# Patient Record
Sex: MALE | Race: WHITE | NOT HISPANIC OR LATINO | Employment: OTHER | URBAN - METROPOLITAN AREA
[De-identification: names, ages, dates, MRNs, and addresses within clinical notes are randomized per-mention and may not be internally consistent; named-entity substitution may affect disease eponyms.]

---

## 2017-02-01 ENCOUNTER — HOSPITAL ENCOUNTER (EMERGENCY)
Facility: HOSPITAL | Age: 37
Discharge: HOME | End: 2017-02-01
Attending: EMERGENCY MEDICINE
Payer: MEDICARE

## 2017-02-01 VITALS
OXYGEN SATURATION: 98 % | HEART RATE: 99 BPM | RESPIRATION RATE: 18 BRPM | TEMPERATURE: 98 F | DIASTOLIC BLOOD PRESSURE: 70 MMHG | BODY MASS INDEX: 26.63 KG/M2 | WEIGHT: 170 LBS | SYSTOLIC BLOOD PRESSURE: 118 MMHG

## 2017-02-01 DIAGNOSIS — M25.569 KNEE PAIN, ACUTE: ICD-10-CM

## 2017-02-01 PROCEDURE — 99283 EMERGENCY DEPT VISIT LOW MDM: CPT

## 2017-02-01 RX ORDER — MELOXICAM 7.5 MG/1
7.5 TABLET ORAL DAILY
Qty: 15 TABLET | Refills: 0 | Status: SHIPPED | OUTPATIENT
Start: 2017-02-01 | End: 2018-05-05

## 2017-02-01 NOTE — ED AVS SNAPSHOT
[facility]  [address] [address] [address]               Philipp Gonzales   2017 11:00 AM   ED    Description:  Male : 1980   Department:  [facility]           Your Care was Coordinated By:     Provider Role From To    Paresh Moses MD Attending Provider 17 1057 --      Reason for Visit     Knee Pain           Diagnoses this Visit        Comments    Knee pain, acute           ED Disposition     ED Disposition Condition Comment    Discharge             To Do List           Follow-up Information     Follow up with Michael Perez MD In 2 days.    Specialty:  Family Medicine    Contact information:    [address] [place] [address] FAMILY MEDICINE  [facility] [address]  [phone]         These Medications        Disp Refills Start End    meloxicam (MOBIC) 7.5 MG tablet 15 tablet 0 2017     Take 1 tablet (7.5 mg total) by mouth once daily. - Oral      [facility]     [facility] -  Assistance  Registered nurses in the [facility] provide clinical advisement, health education, appointment booking, and other advisory services.  Call for this free service at [phone].             Medications           Message regarding Medications     Verify the changes and/or additions to your medication regime listed below are the same as discussed with your clinician today.  If any of these changes or additions are incorrect, please notify your healthcare provider.        START taking these NEW medications        Refills    meloxicam (MOBIC) 7.5 MG tablet 0    Sig: Take 1 tablet (7.5 mg total) by mouth once daily.    Class: Print    Route: Oral           Verify that the below list of medications is an accurate representation of the medications you are currently taking.  If none reported, the list may be blank. If incorrect, please contact your healthcare provider. Carry this list with you in case of  emergency.           Current Medications     alprazolam (XANAX) 2 MG Tab Take by mouth nightly as needed.    dextroamphetamine-amphetamine (AMPHETAMINE SALT COMBO) 30 mg Tab Take by mouth.    escitalopram (LEXAPRO) 20 MG tablet Take 20 mg by mouth once daily.    meloxicam (MOBIC) 7.5 MG tablet Take 1 tablet (7.5 mg total) by mouth once daily.    oxcarbazepine (TRILEPTAL) 150 MG Tab Take 150 mg by mouth 2 (two) times daily.           Clinical Reference Information           Your Vitals Were     BP Pulse Temp Resp Weight SpO2    118/70 (BP Location: Right arm, Patient Position: Sitting) 99 98.2 °F (36.8 °C) (Oral) 18 77.1 kg (170 lb) 98%    BMI                26.63 kg/m2          Allergies as of 2/1/2017     No Known Allergies      Immunizations Administered on Date of Encounter - 2/1/2017     None      ED Micro, Lab, POCT     None      ED Imaging Orders     Start Ordered       Status Ordering Provider    02/01/17 1112 02/01/17 1111  X-Ray Knee Complete 4 Or More Views Right  1 time imaging      Final result         Discharge Instructions         Ace Wrap  Minor muscle or joint injuries are often treated with an elastic bandage. The bandage provides support and compression to the injured area. An elastic bandage is a stretchy, rolled bandage. Elastic bandages range in width from 2 to 6 inches. They can be used for a variety of injuries. The bandages are often called ACE bandages, after the most common brand name.  If used correctly, elastic bandages help control swelling and ease pain. An elastic bandage is also a good reminder not to overuse the injured area. However, elastic bandages do not provide a lot of support and will not prevent reinjury.  Home care    To apply an elastic bandage:  · Check the skin before wrapping the injury. It should be clean, dry, and free of drainage.  · Start wrapping below the injury and work your way toward the body. For an ankle sprain, start wrapping around the foot and work up  toward the calf. This will help control swelling.  · Overlap the edges of the bandage so it stays snuggly in place.  · Wrap the bandage firmly, but not too tightly. A tight bandage can increase swelling on either end of the bandage. Make sure the bandage is wrinkle free.  · Leave fingers and toes exposed.  · Secure ends of the bandage (even self-sticking ones) with clips or tape.  · Check frequently to ensure adequate circulation, especially in the fingers and toes. Loosen the bandage if there is local swelling, numbness, tingling, discomfort, coldness, or discoloration (skin pale or bluish in color).  · Rewrap the bandage as needed during the day. Reroll the bandage as you unwind it.  Continue using the elastic bandage until the pain and swelling are gone or as your healthcare provider advises.  If you have been told to ice the area, the ice can be secured in place with the elastic bandage. Wrap the ice pack with a thin towel to protect the skin. Do not put ice or an ice pack directly on the skin.  Ice the area for no more than 20 minutes at a time.    Follow-up care  Follow up with your healthcare provider, as advised.  When to seek medical advice  Call your healthcare provider for any of the following:  · Pain and swelling that doesn't get better or gets worse  · Trouble moving injured area  · Skin discoloration, numbness, or tingling that doesnt go away after bandage is removed  © 7216-0432 [employer]. [address] [place] [address], [place] [address]. All rights reserved. This information is not intended as a substitute for professional medical care. Always follow your healthcare professional's instructions.          [facility] Sign-Up     Activating your [facility] account is as easy as 1-2-3!     1) Visit [email or web address], select Sign Up Now, enter this activation code and your date of birth, then select Next.  [email or web address]  Expires: 3/18/2017 11:40 AM      2) Create a username and password to use  when you visit [facility] in the future and select a security question in case you lose your password and select Next.    3) Enter your e-mail address and click Sign Up!    Additional Information  If you have questions, please e-mail [email or web address] or call [phone] to talk to our [facility] staff. Remember, [facility] is NOT to be used for urgent needs. For medical emergencies, dial 911.         Smoking Cessation     If you would like to quit smoking:   You may be eligible for free services if you are a [place] resident and started smoking cigarettes before September 1, 1988.  Call the Smoking Cessation Trust ([facility]) toll free at [phone] [phone] or [phone].   Call 5-937-QUIT-NOW if you do not meet the above criteria.             [facility] complies with applicable Federal civil rights laws and does not discriminate on the basis of race, color, national origin, age, disability, or sex.        Language Assistance Services     ATTENTION: Language assistance services are available, free of charge. Please call [phone].      ATENCIÓN: Si habla español, tiene a scott disposición servicios gratuitos de asistencia lingüística. Llame al [phone].     CHÚ Ý: N?u b?n nói Ti?ng Vi?t, có các d?ch v? h? tr? ngôn ng? mi?n phí dành cho b?n. G?i s? [phone].

## 2017-02-01 NOTE — DISCHARGE INSTRUCTIONS
Ace Wrap  Minor muscle or joint injuries are often treated with an elastic bandage. The bandage provides support and compression to the injured area. An elastic bandage is a stretchy, rolled bandage. Elastic bandages range in width from 2 to 6 inches. They can be used for a variety of injuries. The bandages are often called ACE bandages, after the most common brand name.  If used correctly, elastic bandages help control swelling and ease pain. An elastic bandage is also a good reminder not to overuse the injured area. However, elastic bandages do not provide a lot of support and will not prevent reinjury.  Home care    To apply an elastic bandage:  · Check the skin before wrapping the injury. It should be clean, dry, and free of drainage.  · Start wrapping below the injury and work your way toward the body. For an ankle sprain, start wrapping around the foot and work up toward the calf. This will help control swelling.  · Overlap the edges of the bandage so it stays snuggly in place.  · Wrap the bandage firmly, but not too tightly. A tight bandage can increase swelling on either end of the bandage. Make sure the bandage is wrinkle free.  · Leave fingers and toes exposed.  · Secure ends of the bandage (even self-sticking ones) with clips or tape.  · Check frequently to ensure adequate circulation, especially in the fingers and toes. Loosen the bandage if there is local swelling, numbness, tingling, discomfort, coldness, or discoloration (skin pale or bluish in color).  · Rewrap the bandage as needed during the day. Reroll the bandage as you unwind it.  Continue using the elastic bandage until the pain and swelling are gone or as your healthcare provider advises.  If you have been told to ice the area, the ice can be secured in place with the elastic bandage. Wrap the ice pack with a thin towel to protect the skin. Do not put ice or an ice pack directly on the skin.  Ice the area for no more than 20 minutes at a  time.    Follow-up care  Follow up with your healthcare provider, as advised.  When to seek medical advice  Call your healthcare provider for any of the following:  · Pain and swelling that doesn't get better or gets worse  · Trouble moving injured area  · Skin discoloration, numbness, or tingling that doesnt go away after bandage is removed  © 1783-7232 [employer]. [address] [place] [address], [place] [address]. All rights reserved. This information is not intended as a substitute for professional medical care. Always follow your healthcare professional's instructions.

## 2017-02-01 NOTE — ED PROVIDER NOTES
Encounter Date: 2/1/2017       History     Chief Complaint   Patient presents with    Knee Pain     pt was knealing on knee for a couple hours while working a couple weeks ago and pain is still present and worse at night.      Review of patient's allergies indicates:  No Known Allergies  Patient is a 36 y.o. male presenting with the following complaint: leg pain. The history is provided by the patient.   Leg Pain    The incident occurred at home. The injury mechanism was a direct blow. The incident occurred several days ago. The pain is present in the right knee. The quality of the pain is described as throbbing. The pain has been constant since onset. Pertinent negatives include no numbness, no inability to bear weight, no loss of motion, no muscle weakness, no loss of sensation and no tingling. He reports no foreign bodies present. The symptoms are aggravated by activity. He has tried nothing for the symptoms.     Past Medical History   Diagnosis Date    Anxiety     High cholesterol     Macular degeneration      No past medical history pertinent negatives.  No past surgical history on file.  Family History   Problem Relation Age of Onset    Macular degeneration Mother     Macular degeneration Maternal Grandfather      Social History   Substance Use Topics    Smoking status: Current Every Day Smoker     Packs/day: 1.00     Types: Cigarettes    Smokeless tobacco: Never Used    Alcohol use Yes      Comment: rarely     Review of Systems   Constitutional: Negative for fever.   HENT: Negative for sore throat.    Respiratory: Negative for shortness of breath.    Cardiovascular: Negative for chest pain.   Gastrointestinal: Negative for nausea.   Genitourinary: Negative for dysuria.   Musculoskeletal: Negative for back pain.   Skin: Negative for rash.   Neurological: Negative for tingling, weakness and numbness.   Hematological: Does not bruise/bleed easily.       Physical Exam   Initial Vitals   BP Pulse Resp  Temp SpO2   02/01/17 1058 02/01/17 1058 02/01/17 1058 02/01/17 1058 02/01/17 1058   118/70 99 18 98.2 °F (36.8 °C) 98 %     Physical Exam    Constitutional: He appears well-developed and well-nourished. No distress.   HENT:   Head: Normocephalic and atraumatic.   Eyes: Conjunctivae are normal. Pupils are equal, round, and reactive to light.   Neck: Normal range of motion. Neck supple.   Cardiovascular: Normal rate, regular rhythm and normal heart sounds.   Pulmonary/Chest: Breath sounds normal.   Abdominal: Soft. Bowel sounds are normal.   Musculoskeletal: Normal range of motion.        Right knee: He exhibits no swelling, no deformity and no erythema. Tenderness found. Medial joint line tenderness noted.   Neurological: He is alert and oriented to person, place, and time. No cranial nerve deficit.   Skin: Skin is warm and dry.   Psychiatric: He has a normal mood and affect.         ED Course   Orthopedic Injury  Date/Time: 2/1/2017 11:41 AM  Authorized by: SOWMYA BRONSON   Performed by: SOWMYA BRONSON  Injury location: knee  Location details: right knee  Injury type: soft tissue  Pre-procedure neurovascular assessment: neurovascularly intact  Supplies used: elastic bandage  Post-procedure neurovascular assessment: post-procedure neurovascularly intact  Patient tolerance: Patient tolerated the procedure well with no immediate complications        Labs Reviewed - No data to display     ED Vital Signs:  Vitals:    02/01/17 1058   BP: 118/70   Pulse: 99   Resp: 18   Temp: 98.2 °F (36.8 °C)   TempSrc: Oral   SpO2: 98%   Weight: 77.1 kg (170 lb)         Abnormal Lab Results:  Labs Reviewed - No data to display       All Lab Results:        Imaging Results:  Imaging Results         X-Ray Knee Complete 4 Or More Views Right (Final result) Result time:  02/01/17 11:36:20    Final result by Fide Bird MD (02/01/17 11:36:20)    Impression:     No acute findings.      Electronically signed by: FIDE  MARTINA VICTOR  Date:     02/01/17  Time:    11:36     Narrative:    XR KNEE COMP 4 OR MORE VIEWS RIGHT    History:  Right knee joint pain.    Bone density and architecture appears normal.  No obvious fracture.  Mild superior patellar spurring.                 The Emergency Provider reviewed the vital signs and test results, which are outlined above.    ED Discussions:  11:41 AM: Reassessed pt at this time.  Pt states his condition has improved at this time. Discussed with pt all pertinent ED information and results. Discussed pt dx of knee pain and plan of tx. Gave pt all f/u and return to the ED instructions. All questions and concerns were addressed at this time. Pt expresses understanding of information and instructions, and is comfortable with plan to discharge. Pt is stable for discharge.                                 ED Course     Clinical Impression:       ICD-10-CM ICD-9-CM   1. Knee pain, acute M25.569 719.46         Disposition:   Disposition: Discharged  Condition: Stable       Paresh Moses MD  02/01/17 1142

## 2017-05-16 ENCOUNTER — HOSPITAL ENCOUNTER (EMERGENCY)
Facility: HOSPITAL | Age: 37
Discharge: HOME | End: 2017-05-16
Payer: MEDICARE

## 2017-05-16 VITALS
RESPIRATION RATE: 18 BRPM | WEIGHT: 162 LBS | TEMPERATURE: 98 F | HEART RATE: 98 BPM | SYSTOLIC BLOOD PRESSURE: 132 MMHG | BODY MASS INDEX: 25.43 KG/M2 | OXYGEN SATURATION: 97 % | HEIGHT: 67 IN | DIASTOLIC BLOOD PRESSURE: 80 MMHG

## 2017-05-16 DIAGNOSIS — R52 PAIN: ICD-10-CM

## 2017-05-16 DIAGNOSIS — M25.561 RIGHT MEDIAL KNEE PAIN: Primary | ICD-10-CM

## 2017-05-16 DIAGNOSIS — T14.8XXA HEMATOMA: ICD-10-CM

## 2017-05-16 PROCEDURE — 99283 EMERGENCY DEPT VISIT LOW MDM: CPT

## 2017-05-16 NOTE — ED NOTES
"Pt here few months ago for same pain. Pt has since seen PCP and Dr Baker where he had a "cyst drained". Pt states he has to bring his [place] to the zoo this weekend and would like to have no pain while walking.   "

## 2017-05-16 NOTE — ED AVS SNAPSHOT
[facility]  [address] [address]               Philipp Gonzales   2017  6:18 PM   ED    Description:  Male : 1980   Department:  [facility]           Your Care was Coordinated By:     Provider Role From To    Juan Samuels NP Nurse Practitioner 17 [phone] --      Reason for Visit     Knee Pain           Diagnoses this Visit        Comments    Right medial knee pain    -  Primary     Hematoma         Pain           ED Disposition     None           To Do List           Follow-up Information     Follow up with Osorio Baker MD.    Specialty:  Orthopedic Surgery    Why:  Return to ER for any new or worse symptoms    Contact information:    [address] [facility]  [address]  [facility] [address] [phone]        [facility]     [facility] -  Assistance  Unless otherwise directed by your provider, please contact [facility], our nurse care line that is available for  assistance.     Registered nurses in the [facility] provide: appointment scheduling, clinical advisement, health education, and other advisory services.  Call: [phone] (toll free)               Medications           Message regarding Medications     Verify the changes and/or additions to your medication regime listed below are the same as discussed with your clinician today.  If any of these changes or additions are incorrect, please notify your healthcare provider.             Verify that the below list of medications is an accurate representation of the medications you are currently taking.  If none reported, the list may be blank. If incorrect, please contact your healthcare provider. Carry this list with you in case of emergency.           Current Medications     alprazolam (XANAX) 2 MG Tab Take by mouth nightly as needed.    dextroamphetamine-amphetamine (AMPHETAMINE SALT COMBO) 30 mg Tab Take by mouth.  "   escitalopram (LEXAPRO) 20 MG tablet Take 20 mg by mouth once daily.    oxcarbazepine (TRILEPTAL) 150 MG Tab Take 150 mg by mouth 2 (two) times daily.    meloxicam (MOBIC) 7.5 MG tablet Take 1 tablet (7.5 mg total) by mouth once daily.           Clinical Reference Information           Your Vitals Were     BP Pulse Temp Resp Height Weight    132/80 (BP Location: Left arm, Patient Position: Sitting) 98 98.2 °F (36.8 °C) (Oral) 18 5' 7" (1.702 m) 73.5 kg (162 lb)    SpO2 BMI             97% 25.37 kg/m2         Allergies as of 5/16/2017     No Known Allergies      Immunizations Administered on Date of Encounter - 5/16/2017     None      ED Micro, Lab, POCT     None      ED Imaging Orders     Start Ordered       Status Ordering Provider    05/16/17 1851 05/16/17 1850  X-Ray Knee 3 View Right  1 time imaging      Final result         Discharge Instructions         Knee Pain  Knee pain is very common. Its especially common in active people who put a lot of pressure on their knees, like runners. It affects women more often than men.  Your kneecap (patella) is a thick, round bone. It covers and protects the front portion of your knee joint. It moves along a groove in your thighbone (femur) as part of the patellofemoral joint. A layer of cartilage surrounds the underside of your kneecap. This layer protects it from grinding against your femur.  When this cartilage softens and breaks down, it can cause knee pain. This is partly because of repetitive stress. The stress irritates the lining of the joint. This causes pain in the underlying bone.  What causes knee pain?  Many things can cause knee pain. You may have more than one cause. Some of these include:  · Overuse of the knee joint  · The kneecap doesnt line up with the tissue around it  · Damage to small nerves in the area  · Damage to the ligament-like structure that holds the kneecap in place (retinaculum)  · Breakdown of the bone under the cartilage  · Swelling in " the soft tissues around the kneecap  · Injury  You might be more likely to have knee pain if you:  · Exercise a lot  · Recently increased the intensity of your workouts  · Have a body mass index (BMI) greater than 25  · Have poor alignment of your kneecap  · Walk with your feet turned overly outward or inward  · Have weakness in surrounding muscle groups (inner quad or hip adductor muscles)  · Have too much tightness in surrounding muscle groups (hamstrings or iliotibial band)  · Have a recent history of injury to the area  · Are female  Symptoms of knee pain  This type of knee pain is a dull, aching pain in the front of the knee in the area under and around the kneecap. This pain may start quickly or slowly. Your pain might be worse when you squat, run, or sit for a long time. You might also sometimes feel like your knee is giving out. You may have symptoms in one or both of your knees.  Diagnosing knee pain  Your health care provider will ask about your medical history and your symptoms. Be sure to describe any activities that make your knee pain worse. He or she will look at your knee. This will include tests of your range of motion, strength, and areas of pain of your knee. Your knee alignment will be checked.  Your health care provider will need to rule out other causes of your knee pain, such as arthritis. You may need an imaging test, such as an X-ray or MRI.  Treatment for knee pain  Treatments that can help ease your symptoms may include:  · Avoiding activities for a while that make your pain worse, returning to activity over time  · Icing the outside of your knee when it causes you pain  · Taking over-the-counter pain medicine  · Wearing a knee brace or taping your knee to support it  · Wearing special shoe inserts to help keep your feet in the proper alignment  · Doing special exercises to stretch and strengthen the muscles around your hip and your knee  These steps help most people manage knee pain. But  some cases of knee pain need to be treated with surgery. You may need surgery right away. Or you may need it later if other treatments dont work. Your health care provider may refer you to an orthopedic surgeon. He or she will talk with you about your choices.  Preventing knee pain  Losing weight and correcting excess muscle tightness or muscle weakness may help lower your risk.  In some cases, you can prevent knee pain. To help prevent a flare-up of knee pain, you do these things:  · Regularly do all the exercises your doctor or physical therapist advises  · Support your knee as advised by your doctor or physical therapist  · Increase training gradually, and ease up on training when needed  · Have an expert check your gait for running or other sporting activities  · Stretch properly before and after exercise  · Replace your running shoes regularly  · Lose excess weight     When to call your health care provider  Call your health care provider right away if:  · Your symptoms dont get better after a few weeks of treatment  · You have any new symptoms   Date Last Reviewed: 3/19/2015  © 7047-5068 [employer]. [address] [place] [address]. All rights reserved. This information is not intended as a substitute for professional medical care. Always follow your healthcare professional's instructions.          RICE     Rest an injury, elevate it, and use ice and compression as directed.   RICE stands for rest, ice, compression, and elevation. These can limit pain and swelling after an injury. RICE may be recommended to help treat fractures, sprains, strains, and bruises or bumps.   Home care  The following explain the details of RICE:  · Rest. Limit the use of the injured body part. This helps prevent further damage to the body part and gives it time to heal. In some cases, you may need a sling, brace, splint, or cast to help keep the body part still until it has healed.  · Ice. Applying ice  right after an injury helps relieve pain and swelling. Wrap a bag of ice in a thin towel. Then, place it over the injured area. Do this for 10 to 15 minutes every 3 to 4 hours. Continue for the next 1 to 3 days or until your symptoms improve. Never put ice directly on your skin or ice an area longer than 15 minutes at a time.  · Compression. Putting pressure on an injury helps reduce swelling and provides support. Wrap the injured area firmly with an elastic bandage/wrap. Make sure not to wrap the bandage too tightly or you will cut off blood flow to the injured area. If your bandage loosens, rewrap it.  · Elevation. Keeping an injury raised above the level of your heart reduces swelling, pain, and throbbing. For instance, if you have a broken leg, it may help to rest your leg on several pillows when sitting or lying down. Try to keep the injured area elevated for at least 2 to 3 hours per day.  Follow-up care  Follow up with your healthcare provider, or as advised.  When to seek medical advice  Call your healthcare provider right away if any of these occur:  · Fever of 100.4°F (38°C) or higher, or as directed by your healthcare provider  · Increased pain or swelling in the injured body part  · Injured body part becomes cold, blue, numb, or tingly  · Signs of infection. These include warmth in the skin, redness, drainage, or bad smell coming from the injured body part.  Date Last Reviewed: 1/18/2016  © 1760-2335 [employer]. [address] [place] [address], [place] [address]. All rights reserved. This information is not intended as a substitute for professional medical care. Always follow your healthcare professional's instructions.          [facility] Sign-Up     Activating your [facility] account is as easy as 1-2-3!     1) Visit [email or web address], select Sign Up Now, enter this activation code and your date of birth, then select Next.  [email or web address]  Expires: 6/30/2017  7:40 PM      2) Create a username and  password to use when you visit [facility] in the future and select a security question in case you lose your password and select Next.    3) Enter your e-mail address and click Sign Up!    Additional Information  If you have questions, please e-mail [email or web address] or call [phone] to talk to our [facility] staff. Remember, [facility] is NOT to be used for urgent needs. For medical emergencies, dial 911.         Smoking Cessation     If you would like to quit smoking:   You may be eligible for free services if you are a [place] resident and started smoking cigarettes before September 1, 1988.  Call the Smoking Cessation Trust ([facility]) toll free at [phone] or [phone] [phone].   Call [phone]-QUIT-NOW if you do not meet the above criteria.   Contact us via email: [email or web address]   View our website for more information: [email or web address]         [facility] complies with applicable Federal civil rights laws and does not discriminate on the basis of race, color, national origin, age, disability, or sex.        Language Assistance Services     ATTENTION: Language assistance services are available, free of charge. Please call [phone].      ATENCIÓN: Si habla yissel, tiene a scott disposición servicios gratuitos de asistencia lingüística. Llame al [phone].     CHÚ Ý: N?u b?n nói Ti?ng Vi?t, có các d?ch v? h? tr? ngôn ng? mi?n phí dành cho b?n. G?i s? [phone].

## 2017-05-17 NOTE — DISCHARGE INSTRUCTIONS
Knee Pain  Knee pain is very common. Its especially common in active people who put a lot of pressure on their knees, like runners. It affects women more often than men.  Your kneecap (patella) is a thick, round bone. It covers and protects the front portion of your knee joint. It moves along a groove in your thighbone (femur) as part of the patellofemoral joint. A layer of cartilage surrounds the underside of your kneecap. This layer protects it from grinding against your femur.  When this cartilage softens and breaks down, it can cause knee pain. This is partly because of repetitive stress. The stress irritates the lining of the joint. This causes pain in the underlying bone.  What causes knee pain?  Many things can cause knee pain. You may have more than one cause. Some of these include:  · Overuse of the knee joint  · The kneecap doesnt line up with the tissue around it  · Damage to small nerves in the area  · Damage to the ligament-like structure that holds the kneecap in place (retinaculum)  · Breakdown of the bone under the cartilage  · Swelling in the soft tissues around the kneecap  · Injury  You might be more likely to have knee pain if you:  · Exercise a lot  · Recently increased the intensity of your workouts  · Have a body mass index (BMI) greater than 25  · Have poor alignment of your kneecap  · Walk with your feet turned overly outward or inward  · Have weakness in surrounding muscle groups (inner quad or hip adductor muscles)  · Have too much tightness in surrounding muscle groups (hamstrings or iliotibial band)  · Have a recent history of injury to the area  · Are female  Symptoms of knee pain  This type of knee pain is a dull, aching pain in the front of the knee in the area under and around the kneecap. This pain may start quickly or slowly. Your pain might be worse when you squat, run, or sit for a long time. You might also sometimes feel like your knee is giving out. You may have symptoms in  one or both of your knees.  Diagnosing knee pain  Your health care provider will ask about your medical history and your symptoms. Be sure to describe any activities that make your knee pain worse. He or she will look at your knee. This will include tests of your range of motion, strength, and areas of pain of your knee. Your knee alignment will be checked.  Your health care provider will need to rule out other causes of your knee pain, such as arthritis. You may need an imaging test, such as an X-ray or MRI.  Treatment for knee pain  Treatments that can help ease your symptoms may include:  · Avoiding activities for a while that make your pain worse, returning to activity over time  · Icing the outside of your knee when it causes you pain  · Taking over-the-counter pain medicine  · Wearing a knee brace or taping your knee to support it  · Wearing special shoe inserts to help keep your feet in the proper alignment  · Doing special exercises to stretch and strengthen the muscles around your hip and your knee  These steps help most people manage knee pain. But some cases of knee pain need to be treated with surgery. You may need surgery right away. Or you may need it later if other treatments dont work. Your health care provider may refer you to an orthopedic surgeon. He or she will talk with you about your choices.  Preventing knee pain  Losing weight and correcting excess muscle tightness or muscle weakness may help lower your risk.  In some cases, you can prevent knee pain. To help prevent a flare-up of knee pain, you do these things:  · Regularly do all the exercises your doctor or physical therapist advises  · Support your knee as advised by your doctor or physical therapist  · Increase training gradually, and ease up on training when needed  · Have an expert check your gait for running or other sporting activities  · Stretch properly before and after exercise  · Replace your running shoes regularly  · Lose  excess weight     When to call your health care provider  Call your health care provider right away if:  · Your symptoms dont get better after a few weeks of treatment  · You have any new symptoms   Date Last Reviewed: 3/19/2015  © 7891-7788 [employer]. [address] [place] [address]. All rights reserved. This information is not intended as a substitute for professional medical care. Always follow your healthcare professional's instructions.          RICE     Rest an injury, elevate it, and use ice and compression as directed.   RICE stands for rest, ice, compression, and elevation. These can limit pain and swelling after an injury. RICE may be recommended to help treat fractures, sprains, strains, and bruises or bumps.   Home care  The following explain the details of RICE:  · Rest. Limit the use of the injured body part. This helps prevent further damage to the body part and gives it time to heal. In some cases, you may need a sling, brace, splint, or cast to help keep the body part still until it has healed.  · Ice. Applying ice right after an injury helps relieve pain and swelling. Wrap a bag of ice in a thin towel. Then, place it over the injured area. Do this for 10 to 15 minutes every 3 to 4 hours. Continue for the next 1 to 3 days or until your symptoms improve. Never put ice directly on your skin or ice an area longer than 15 minutes at a time.  · Compression. Putting pressure on an injury helps reduce swelling and provides support. Wrap the injured area firmly with an elastic bandage/wrap. Make sure not to wrap the bandage too tightly or you will cut off blood flow to the injured area. If your bandage loosens, rewrap it.  · Elevation. Keeping an injury raised above the level of your heart reduces swelling, pain, and throbbing. For instance, if you have a broken leg, it may help to rest your leg on several pillows when sitting or lying down. Try to keep the injured area elevated  for at least 2 to 3 hours per day.  Follow-up care  Follow up with your healthcare provider, or as advised.  When to seek medical advice  Call your healthcare provider right away if any of these occur:  · Fever of 100.4°F (38°C) or higher, or as directed by your healthcare provider  · Increased pain or swelling in the injured body part  · Injured body part becomes cold, blue, numb, or tingly  · Signs of infection. These include warmth in the skin, redness, drainage, or bad smell coming from the injured body part.  Date Last Reviewed: 1/18/2016  © 9299-3610 [employer]. [address] [place] [address]. All rights reserved. This information is not intended as a substitute for professional medical care. Always follow your healthcare professional's instructions.

## 2017-05-17 NOTE — ED PROVIDER NOTES
Encounter Date: 5/16/2017       History     Chief Complaint   Patient presents with    Knee Pain     right knee pain for a few months      Review of patient's allergies indicates:  No Known Allergies  HPI Comments: Patient had fluid aspiration of right knee per Dr. Baker in office 05/08/2017. Patient reports now he has mild swelling to right medial knee with pain 1/10 with ambulation. Patient reports he called Dr. Baker's office and they reported pain and swelling would be normal after this type of procedure. Patient just wants to be checked out to make sure everything is okay. Patient denies any numbness, tingling, drainage or weakness,     Patient is a 36 y.o. male presenting with the following complaint: leg pain. The history is provided by the patient.   Leg Pain    Incident location: at orthopedics office. Injury mechanism: needle aspiration. The incident occurred several days ago. The pain is present in the right knee. The quality of the pain is described as throbbing. The pain is at a severity of 1/10. The pain has been constant since onset. Pertinent negatives include no numbness, no inability to bear weight, no loss of motion, no muscle weakness, no loss of sensation and no tingling. He reports no foreign bodies present. The symptoms are aggravated by palpation. He has tried nothing for the symptoms. The treatment provided no relief.     Past Medical History:   Diagnosis Date    Anxiety     High cholesterol     Macular degeneration      History reviewed. No pertinent surgical history.  Family History   Problem Relation Age of Onset    Macular degeneration Mother     Macular degeneration Maternal Grandfather      Social History   Substance Use Topics    Smoking status: Current Every Day Smoker     Packs/day: 1.00     Types: Cigarettes    Smokeless tobacco: Never Used    Alcohol use Yes      Comment: rarely     Review of Systems   Constitutional: Negative for fever.   HENT: Negative for sore  throat.    Respiratory: Negative for shortness of breath.    Cardiovascular: Negative for chest pain.   Gastrointestinal: Negative for nausea.   Genitourinary: Negative for dysuria.   Musculoskeletal: Negative for back pain.   Skin: Negative for rash.        Hematoma right medial knee   Neurological: Negative for tingling, weakness and numbness.   Hematological: Does not bruise/bleed easily.   All other systems reviewed and are negative.      Physical Exam   Initial Vitals   BP Pulse Resp Temp SpO2   05/16/17 1816 05/16/17 1816 05/16/17 1816 05/16/17 1816 05/16/17 1816   132/80 98 18 98.2 °F (36.8 °C) 97 %     Physical Exam    Nursing note and vitals reviewed.  Constitutional: He appears well-developed and well-nourished.   HENT:   Head: Normocephalic and atraumatic.   Eyes: EOM are normal. Pupils are equal, round, and reactive to light.   Neck: Normal range of motion. Neck supple.   Cardiovascular: Normal rate, regular rhythm, intact distal pulses and normal pulses.   Pulses:       Dorsalis pedis pulses are 2+ on the right side, and 2+ on the left side.   Pulmonary/Chest: Breath sounds normal.   Abdominal: Soft. Bowel sounds are normal.   Musculoskeletal: Normal range of motion.        Right knee: He exhibits ecchymosis. Tenderness found.        Legs:  Patient has full ROM of right knee and is neurovascular intact distal to injury    Neurological: He is alert and oriented to person, place, and time. He has normal strength. No sensory deficit.   Skin: Skin is warm and dry.   Psychiatric: He has a normal mood and affect.         ED Course   Procedures  Labs Reviewed - No data to display     7:43 PM RE-EVALUATION & DISPOSITION:   Reassessment at the time of disposition demonstrates that the patient is resting comfortably in no acute distress.  He has remained hemodynamically stable throughout the entire ED visit and is without objective evidence for acute process requiring urgent intervention or hospitalization. I  discussed test results and provided counseling to patient with regard to condition, the treatment plan, specific conditions for return, and the importance of follow up.  Answered questions at this time. The patient is stable for discharge. Patient is ambulating without pain or difficulty. Patient will follow-up with Dr. Baker next week.                          ED Course     Clinical Impression:   The primary encounter diagnosis was Right medial knee pain. Diagnoses of Hematoma and Pain were also pertinent to this visit.    Disposition:   Disposition: Discharged  Condition: Stable       Juan Samuels NP  05/16/17 1945

## 2018-05-05 ENCOUNTER — HOSPITAL ENCOUNTER (EMERGENCY)
Facility: HOSPITAL | Age: 38
Discharge: HOME | End: 2018-05-05
Payer: MEDICARE

## 2018-05-05 VITALS
DIASTOLIC BLOOD PRESSURE: 88 MMHG | HEART RATE: 84 BPM | RESPIRATION RATE: 18 BRPM | OXYGEN SATURATION: 96 % | SYSTOLIC BLOOD PRESSURE: 144 MMHG | WEIGHT: 228.38 LBS | TEMPERATURE: 99 F | HEIGHT: 67 IN | BODY MASS INDEX: 35.84 KG/M2

## 2018-05-05 DIAGNOSIS — M25.461 KNEE EFFUSION, RIGHT: Primary | ICD-10-CM

## 2018-05-05 DIAGNOSIS — M25.561 KNEE PAIN, RIGHT: ICD-10-CM

## 2018-05-05 PROCEDURE — 99283 EMERGENCY DEPT VISIT LOW MDM: CPT | Mod: 25

## 2018-05-05 PROCEDURE — 29505 APPLICATION LONG LEG SPLINT: CPT | Mod: RT

## 2018-05-05 PROCEDURE — 25000003 PHARM REV CODE 250: Performed by: NURSE PRACTITIONER

## 2018-05-05 RX ORDER — NAPROXEN 500 MG/1
500 TABLET ORAL
Status: COMPLETED | OUTPATIENT
Start: 2018-05-05 | End: 2018-05-05

## 2018-05-05 RX ORDER — NAPROXEN 500 MG/1
500 TABLET ORAL 2 TIMES DAILY WITH MEALS
Qty: 14 TABLET | Refills: 0 | OUTPATIENT
Start: 2018-05-05 | End: 2019-05-30

## 2018-05-05 RX ADMIN — NAPROXEN 500 MG: 500 TABLET ORAL at 12:05

## 2018-05-05 NOTE — ED PROVIDER NOTES
Encounter Date: 5/5/2018       History     Chief Complaint   Patient presents with    Knee Pain     x 2 years, right knee, denies injury, got worse in last 2 weeks, pain radiating down shin     The history is provided by the patient.   Leg Pain    There was no injury mechanism. Illness onset: 2 years right knee pain worse in last few days with mild sweling to inner knee, frantz is legally blind and is ED today because he was able t o find ride  The pain is present in the right knee. The pain is at a severity of 6/10. The pain has been intermittent since onset. Pertinent negatives include no numbness, no inability to bear weight, no loss of motion, no muscle weakness, no loss of sensation and no tingling. He reports no foreign bodies present. The symptoms are aggravated by bearing weight. He has tried nothing (was trated buy ortho with needle aspiration of cyst 6 mnonths ago but not feeling any better, pain now going down leg when walking ) for the symptoms. The treatment provided no relief.     Review of patient's allergies indicates:  No Known Allergies  Past Medical History:   Diagnosis Date    Anxiety     High cholesterol     Macular degeneration      No past surgical history on file.  Family History   Problem Relation Age of Onset    Macular degeneration Mother     Macular degeneration Maternal Grandfather      Social History   Substance Use Topics    Smoking status: Current Every Day Smoker     Packs/day: 1.00     Types: Cigarettes    Smokeless tobacco: Never Used    Alcohol use Yes      Comment: rarely     Review of Systems   Constitutional: Negative for fever.   HENT: Negative for sore throat.    Respiratory: Negative for shortness of breath.    Cardiovascular: Negative for chest pain.   Gastrointestinal: Negative for nausea.   Genitourinary: Negative for dysuria.   Musculoskeletal: Positive for joint swelling. Negative for back pain and gait problem.        Right inner knee mild swelling    Skin:  Negative for rash.   Neurological: Negative for tingling, weakness and numbness.   Hematological: Does not bruise/bleed easily.   All other systems reviewed and are negative.      Physical Exam     Initial Vitals [05/05/18 1236]   BP Pulse Resp Temp SpO2   (!) 156/89 95 18 98.5 °F (36.9 °C) 96 %      MAP       111.33         Physical Exam    Nursing note and vitals reviewed.  Constitutional: Vital signs are normal. He appears well-nourished. He is not diaphoretic. He is cooperative.  Non-toxic appearance. He does not have a sickly appearance. He does not appear ill. No distress.   HENT:   Head: Normocephalic. Head is without laceration.   Right Ear: External ear normal.   Left Ear: External ear normal.   Eyes: Conjunctivae and lids are normal. Lids are everted and swept, no foreign bodies found.   Neck: Trachea normal, normal range of motion, full passive range of motion without pain and phonation normal. Neck supple. No thyromegaly present.   Cardiovascular: Regular rhythm, normal heart sounds, intact distal pulses and normal pulses.   Abdominal: Soft. Normal appearance and bowel sounds are normal. He exhibits no distension, no ascites and no mass. There is no tenderness.   Musculoskeletal:        Right hip: Normal.        Right knee: He exhibits swelling and effusion. He exhibits normal range of motion, no ecchymosis, no deformity, no laceration, no erythema, normal alignment, no LCL laxity, normal patellar mobility, no bony tenderness, normal meniscus and no MCL laxity. Tenderness found. MCL tenderness noted. No medial joint line, no lateral joint line, no LCL and no patellar tendon tenderness noted.        Right ankle: Normal.        Right upper leg: Normal.        Right lower leg: Normal.        Legs:       Right foot: Normal.   Lymphadenopathy:     He has no cervical adenopathy.     He has no axillary adenopathy.   Neurological: He is alert and oriented to person, place, and time. He has normal reflexes. No  cranial nerve deficit or sensory deficit. GCS eye subscore is 4. GCS verbal subscore is 5. GCS motor subscore is 6.   Skin: Skin is warm, dry and intact. Capillary refill takes less than 2 seconds. No laceration, no rash and no abscess noted. No erythema.   Psychiatric: He has a normal mood and affect. His speech is normal and behavior is normal. Cognition and memory are normal.         ED Course   Procedures  Labs Reviewed - No data to display     Imaging Results          X-Ray Knee Complete 4 or more Views Right (Final result)  Result time 05/05/18 13:04:03   Procedure changed from X-Ray Knee 3 View Right     Final result by Aki Leal MD (05/05/18 13:04:03)                 Impression:      No acute fracture or dislocation.  Small joint effusion is suspected.  Mild degenerative changes.      Electronically signed by: Aki Leal MD  Date:    05/05/2018  Time:    13:04             Narrative:    EXAMINATION:  XR KNEE COMP 4 OR MORE VIEWS RIGHT    CLINICAL HISTORY:  Pain in right kneeknee pain, right;    FINDINGS:  Results:  No evidence of acute fracture or dislocation.  Bony mineralization is normal.  Soft tissues are unremarkable. Lateral view of the right knee demonstrates small joint effusion.    Mild tricompartment degenerative changes.                            Water on the knee is also known as knee effusion. The knee joint normally has less than 1 ounce of fluid. Injury or inflammation of the knee joint causes extra fluid to collect there. When this happens, the knee joint looks swollen and is usually painful. It may be hard to fully bend the knee.  The most common cause of water on the knee is osteoarthritis due to wear and tear on the joint cartilage. Other causes include injury to the cartilage, inflammatory arthritis such as gout or rheumatoid arthritis, and infection of the joint.  You may need a needle aspiration, if the cause of your water on the knee is not certain. This procedure removes a  sample of joint fluid from the knee for testing. This is done with a local anesthetic. Removing excess fluid may also relieve swelling and pain.  Home care  · Limit your activities. Stay off the injured leg as much as possible until pain improves.  · Keep your leg elevated to reduce pain and swelling. When sleeping, place a pillow under the injured leg. When sitting, support the injured leg so it is level with your waist. This is very important during the first 48 hours.  · Apply an ice pack over the injured area for 15 to 20 minutes every 3 to 6 hours. You should do this for the first 24 to 48 hours. You can make an ice pack by filling a plastic bag that seals at the top with ice cubes and then wrapping it with a thin towel. Continue to use ice packs for relief of pain and swelling as needed. As the ice melts, be careful to avoid getting your wrap, splint, or cast wet. After 48 hours, apply heat(warm shower or warm bath) for 15 to 20 minutes several times a day, or alternate ice and heat. If you have to wear a hook-and-loop knee brace, you can open it to apply the ice pack, or heat, directly to the knee. Never put ice directly on the skin. Always wrap the ice in a towel or other type of cloth.  · You may use over-the-counter pain medicine to control pain, unless another pain medicine was prescribed. If you have chronic liver or kidney disease or have ever had a stomach ulcer or GI bleeding, talk with your healthcare provider before using these medicines.  · If crutches or a walker have been recommended, do not put weight on the injured leg until you can do so without pain. Check with your healthcare provider before returning to sports or full work duties.  · If you have a hook-and-loop knee brace, you can remove it to bathe and sleep, unless told otherwise.  Follow-up care  Follow up with your healthcare provider as advised.  If you are overweight, talk to your healthcare provider about a weight loss program. The  excess weight puts extra strain on your knees.  When to seek medical advice  Call your healthcare provider right away if any of these occur:  · Increasing pain, redness, or swelling of the knee  · Fever of 100.4°F (38°C) or above lasting for 24 to 48 hours    All historical, clinical, radiographic, and laboratory findings were reviewed with the patient in detail.  Findings are consistent with a diagnosis of knee effusion  All remaining questions and concerns were addressed at that time.  Patient has been counseled regarding the need for follow-up as well as the indication to return to the emergency room should new or worrisome developments occur.                                Clinical Impression:   The primary encounter diagnosis was Knee effusion, right. A diagnosis of Knee pain, right was also pertinent to this visit.                             Mitchell Schroeder NP  05/05/18 8210

## 2019-05-30 ENCOUNTER — HOSPITAL ENCOUNTER (EMERGENCY)
Facility: HOSPITAL | Age: 39
Discharge: HOME | End: 2019-05-30
Attending: EMERGENCY MEDICINE
Payer: MEDICARE

## 2019-05-30 VITALS
BODY MASS INDEX: 36.06 KG/M2 | OXYGEN SATURATION: 96 % | WEIGHT: 229.75 LBS | DIASTOLIC BLOOD PRESSURE: 78 MMHG | HEIGHT: 67 IN | HEART RATE: 90 BPM | SYSTOLIC BLOOD PRESSURE: 129 MMHG | RESPIRATION RATE: 20 BRPM | TEMPERATURE: 99 F

## 2019-05-30 DIAGNOSIS — F17.200 CURRENT SMOKER: ICD-10-CM

## 2019-05-30 DIAGNOSIS — R03.0 ELEVATED BLOOD PRESSURE READING: ICD-10-CM

## 2019-05-30 DIAGNOSIS — K08.89 DENTALGIA: ICD-10-CM

## 2019-05-30 DIAGNOSIS — K02.9 DENTAL CARIES: Primary | ICD-10-CM

## 2019-05-30 PROCEDURE — 99284 EMERGENCY DEPT VISIT MOD MDM: CPT | Mod: ER

## 2019-05-30 RX ORDER — DICLOFENAC SODIUM 75 MG/1
75 TABLET, DELAYED RELEASE ORAL 2 TIMES DAILY
Qty: 10 TABLET | Refills: 0 | Status: SHIPPED | OUTPATIENT
Start: 2019-05-30 | End: 2019-06-04

## 2019-05-30 RX ORDER — PENICILLIN V POTASSIUM 500 MG/1
500 TABLET, FILM COATED ORAL 4 TIMES DAILY
Qty: 28 TABLET | Refills: 0 | Status: SHIPPED | OUTPATIENT
Start: 2019-05-30 | End: 2019-06-06

## 2019-05-30 NOTE — ED NOTES
Aaox3, skin warm and dry, resp unlabored and even. amb with steady gait and cunha well. C/o left lower jaw pain secondary to dental problems and madonna ear pain interm.for month.

## 2019-05-30 NOTE — ED PROVIDER NOTES
History      Chief Complaint   Patient presents with    Dental Pain     left lower jaw pain for 2 days and alternating ear pain for month       Review of patient's allergies indicates:  No Known Allergies     HPI   HPI    5/30/2019, 2:13 PM   History obtained from the patient      History of Present Illness: Philipp Gonzales is a 38 y.o. male patient who presents to the Emergency Department for left lower dental pain x 2-3 days.  Patient states that pain was relieved with OTC pain medications but now gums are swollen.  He states that in the past when this occurs; an antibiotic has helped.  Denies fever, vomiting, diarrhea, difficulty breathing, difficulty swallowing, chest pain, SOB, headache, dizziness.        Arrival mode: Personal vehicle      PCP: Michael Perez MD       Past Medical History:  Past Medical History:   Diagnosis Date    Anxiety     High cholesterol     Insomnia     Legally blind     Macular degeneration        Past Surgical History:  History reviewed. No pertinent surgical history.      Family History:  Family History   Problem Relation Age of Onset    Macular degeneration Mother     Macular degeneration Maternal Grandfather        Social History:  Social History     Tobacco Use    Smoking status: Current Every Day Smoker     Packs/day: 1.00     Types: Cigarettes    Smokeless tobacco: Never Used   Substance and Sexual Activity    Alcohol use: Yes     Comment: rarely    Drug use: No    Sexual activity: Yes     Partners: Female       ROS   Review of Systems   Constitutional: Negative for chills and fever.   HENT: Positive for dental problem. Negative for ear pain and sore throat.    Eyes: Negative for discharge and redness.   Respiratory: Negative for cough and wheezing.    Cardiovascular: Negative for chest pain, palpitations and leg swelling.   Gastrointestinal: Negative for diarrhea, nausea and vomiting.   Genitourinary: Negative for dysuria and frequency.   Musculoskeletal:  "Negative for back pain and neck pain.   Skin: Negative for rash and wound.   Neurological: Negative for dizziness and headaches.       Physical Exam      Initial Vitals [05/30/19 1348]   BP Pulse Resp Temp SpO2   129/78 90 20 99.1 °F (37.3 °C) 96 %      MAP       --          Physical Exam  Nursing Notes and Vital Signs Reviewed.  Constitutional: Patient is in no apparent distress. Awake and alert. Well-developed and well-nourished.  Head: Atraumatic. Normocephalic.  Eyes: PERRL. EOM intact. Conjunctivae are not pale. No scleral icterus.  ENT: Mucous membranes are moist. Oropharynx is clear and symmetric. Multiple missing teeth.  Multiple dental caries.  No facial swelling noted.  No erythema or swelling of gingiva noted.  Handling secretions without difficulty.     Neck: Supple. Full ROM. No lymphadenopathy.  Cardiovascular: Regular rate. Regular rhythm. No murmurs, rubs, or gallops. Distal pulses are 2+ and symmetric.  Pulmonary/Chest: No respiratory distress. Clear to auscultation bilaterally. No wheezing, rales, or rhonchi.  Abdominal: Soft and non-distended.  There is no tenderness.  No rebound, guarding, or rigidity.   Musculoskeletal: Moves all extremities. No obvious deformities. No edema. No calf tenderness.  Skin: Warm and dry.  Neurological:  Alert, awake, and appropriate.  Normal speech.  No acute focal neurological deficits are appreciated.  Psychiatric: Normal affect. Good eye contact. Appropriate in content.    ED Course    Procedures  ED Vital Signs:  Vitals:    05/30/19 1348   BP: 129/78   Pulse: 90   Resp: 20   Temp: 99.1 °F (37.3 °C)   TempSrc: Oral   SpO2: 96%   Weight: 104.2 kg (229 lb 11.5 oz)   Height: 5' 7" (1.702 m)       Abnormal Lab Results:  Labs Reviewed - No data to display     All Lab Results:  None    Imaging Results:  Imaging Results    None                 The Emergency Provider reviewed the vital signs and test results, which are outlined above.    ED Discussion     2:20 PM:  " Discussed with pt all pertinent ED information and results. Discussed pt dx and plan of tx. Gave pt all f/u and return to the ED instructions. All questions and concerns were addressed at this time. Pt expresses understanding of information and instructions, and is comfortable with plan to discharge. Pt is stable for discharge.    I discussed with patient and/or family/caretaker that evaluation in the ED does not suggest any emergent or life threatening medical conditions requiring immediate intervention beyond what was provided in the ED, and I believe patient is safe for discharge.  Regardless, an unremarkable evaluation in the ED does not preclude the development or presence of a serious of life threatening condition. As such, patient was instructed to return immediately for any worsening or change in current symptoms.    Pre-hypertension/Hypertension: The pt has been informed that they may have pre-hypertension or hypertension based on a blood pressure reading in the ED. I recommend that the pt call the PCP listed on their discharge instructions or a physician of their choice this week to arrange f/u for further evaluation of possible pre-hypertension or hypertension.       ED Medication(s):  Medications - No data to display    Discharge Medication List as of 5/30/2019  2:22 PM      START taking these medications    Details   diclofenac (VOLTAREN) 75 MG EC tablet Take 1 tablet (75 mg total) by mouth 2 (two) times daily. for 5 days, Starting u 5/30/2019, Until Tue 6/4/2019, Print      penicillin v potassium (VEETID) 500 MG tablet Take 1 tablet (500 mg total) by mouth 4 (four) times daily. for 7 days, Starting u 5/30/2019, Until Thu 6/6/2019, Print             Follow-up Information     [facility]. Schedule an appointment as soon as possible for a visit in 3 days.    Why:  Dental appointment  Contact information:  Address: [address] [place] [id number].  Phone:  [phone]                    Medical Decision Making        Additional MDM:   Smoking Cessation: The patient is a smoker. The patient was counseled on smoking cessation for: 3 minutes. The patient was counseled on tobacco related  health complications.            Clinical Impression       ICD-10-CM ICD-9-CM   1. Dental caries K02.9 521.00   2. Dentalgia K08.89 525.9   3. Current smoker F17.200 305.1   4. Elevated blood pressure reading R03.0 796.2       Disposition:   Disposition: Discharged  Condition: Stable           Lili Chester PA-C  05/30/19 1521

## 2019-07-23 ENCOUNTER — OFFICE VISIT (OUTPATIENT)
Dept: INTERNAL MEDICINE | Facility: CLINIC | Age: 39
End: 2019-07-23
Payer: MEDICARE

## 2019-07-23 VITALS
TEMPERATURE: 97 F | WEIGHT: 232.38 LBS | HEART RATE: 77 BPM | OXYGEN SATURATION: 95 % | BODY MASS INDEX: 36.47 KG/M2 | HEIGHT: 67 IN | DIASTOLIC BLOOD PRESSURE: 78 MMHG | RESPIRATION RATE: 16 BRPM | SYSTOLIC BLOOD PRESSURE: 137 MMHG

## 2019-07-23 DIAGNOSIS — K02.9 DENTAL CARIES: ICD-10-CM

## 2019-07-23 PROCEDURE — 99999 PR PBB SHADOW E&M-EST. PATIENT-LVL III: CPT | Mod: PBBFAC,,, | Performed by: FAMILY MEDICINE

## 2019-07-23 PROCEDURE — 99999 PR PBB SHADOW E&M-EST. PATIENT-LVL III: ICD-10-PCS | Mod: PBBFAC,,, | Performed by: FAMILY MEDICINE

## 2019-07-23 PROCEDURE — 99213 OFFICE O/P EST LOW 20 MIN: CPT | Mod: PBBFAC,PO | Performed by: FAMILY MEDICINE

## 2019-07-23 PROCEDURE — 99203 OFFICE O/P NEW LOW 30 MIN: CPT | Mod: S$PBB,,, | Performed by: FAMILY MEDICINE

## 2019-07-23 PROCEDURE — 99203 PR OFFICE/OUTPT VISIT, NEW, LEVL III, 30-44 MIN: ICD-10-PCS | Mod: S$PBB,,, | Performed by: FAMILY MEDICINE

## 2019-07-23 RX ORDER — AMOXICILLIN AND CLAVULANATE POTASSIUM 875; 125 MG/1; MG/1
1 TABLET, FILM COATED ORAL EVERY 12 HOURS
Qty: 14 TABLET | Refills: 0 | Status: SHIPPED | OUTPATIENT
Start: 2019-07-23 | End: 2019-07-30

## 2019-07-23 RX ORDER — IBUPROFEN 800 MG/1
800 TABLET ORAL 3 TIMES DAILY PRN
Qty: 30 TABLET | Refills: 0 | Status: SHIPPED | OUTPATIENT
Start: 2019-07-23 | End: 2019-09-12 | Stop reason: SDUPTHER

## 2019-07-23 RX ORDER — TRAZODONE HYDROCHLORIDE 100 MG/1
100 TABLET ORAL NIGHTLY
Refills: 2 | COMMUNITY
Start: 2019-06-21 | End: 2022-03-14 | Stop reason: SDUPTHER

## 2019-07-23 RX ORDER — ESCITALOPRAM OXALATE 20 MG/1
TABLET ORAL
COMMUNITY
Start: 2019-07-12 | End: 2021-02-13 | Stop reason: SDUPTHER

## 2019-07-23 NOTE — PROGRESS NOTES
Subjective:       Patient ID: Philipp Gonzales is a 39 y.o. male.    Chief Complaint: Dental Pain (tooth)    HPI  Here today as a new patient for a new complaint to me come pain and tooth decay.  He has a chronic history of dental cavities.  Does not currently have a dentist.  Most recently was seen in the emergency room in May of this year for the same problem and was given medication to help with his pain as well as antibiotic therapy.  Not having any fever nor chills.  No swollen lymph nodes.  No difficulty swallowing.  Nothing makes it better and nothing makes it worse.    Family History   Problem Relation Age of Onset    Macular degeneration Mother     Macular degeneration Maternal Grandfather        Current Outpatient Medications:     escitalopram oxalate (LEXAPRO) 20 MG tablet, TAKE ONE TABLET BY MOUTH EVERY DAY, Disp: , Rfl:     traZODone (DESYREL) 100 MG tablet, Take 100 mg by mouth nightly., Disp: , Rfl: 2    amoxicillin-clavulanate 875-125mg (AUGMENTIN) 875-125 mg per tablet, Take 1 tablet by mouth every 12 (twelve) hours. for 7 days, Disp: 14 tablet, Rfl: 0    ibuprofen (ADVIL,MOTRIN) 800 MG tablet, Take 1 tablet (800 mg total) by mouth 3 (three) times daily as needed for Pain., Disp: 30 tablet, Rfl: 0    UNKNOWN TO PATIENT, nightly as needed., Disp: , Rfl:     Review of Systems   Constitutional: Negative for chills, fever and unexpected weight change.   HENT: Positive for dental problem. Negative for congestion, ear pain, sore throat and voice change.    Eyes: Negative for pain and visual disturbance.   Respiratory: Negative for cough, shortness of breath and wheezing.    Cardiovascular: Negative for chest pain.   Gastrointestinal: Negative for abdominal pain, nausea and vomiting.   Genitourinary: Negative for difficulty urinating.   Musculoskeletal: Negative for back pain.   Skin: Negative for rash.   Neurological: Negative for dizziness and speech difficulty.   Hematological: Does not  "bruise/bleed easily.   Psychiatric/Behavioral: Negative for sleep disturbance and suicidal ideas. The patient is not nervous/anxious.        Objective:   /78 (BP Location: Left arm, Patient Position: Sitting, BP Method: Medium (Automatic))   Pulse 77   Temp 97.2 °F (36.2 °C) (Tympanic)   Resp 16   Ht 5' 7" (1.702 m)   Wt 105.4 kg (232 lb 5.8 oz)   SpO2 95%   BMI 36.39 kg/m²      Physical Exam   Constitutional: He is oriented to person, place, and time. He appears well-developed and well-nourished. No distress.   HENT:   Head: Normocephalic and atraumatic.   Nose: Nose normal.   Most of his upper teeth been removed but has numerous have these mandible.  Erythema tenderness his lower gums.  No obvious abscesses visible.  No enlarged or tender lymph nodes submental nor tonsillar area.   Eyes: Pupils are equal, round, and reactive to light. Conjunctivae and EOM are normal. Right eye exhibits no discharge. Left eye exhibits no discharge.   Neck: No thyromegaly present.   Cardiovascular: Normal rate, regular rhythm and normal heart sounds. Exam reveals no friction rub.   No murmur heard.  Pulmonary/Chest: Effort normal and breath sounds normal. No respiratory distress. He has no wheezes.   Abdominal: Soft. He exhibits no distension.   Musculoskeletal: He exhibits no edema.   Neurological: He is alert and oriented to person, place, and time.   Skin: Skin is warm. No rash noted. He is not diaphoretic.   Psychiatric: He has a normal mood and affect. His behavior is normal.   Vitals reviewed.      Assessment & Plan     Problem List Items Addressed This Visit        ENT    Dental caries    Current Assessment & Plan     Provided with list for dentists and giving course of augmentin along with ibuprofen                 Follow up if symptoms worsen or fail to improve.    Disclaimer:  This note may have been prepared using voice recognition software, it may have not been extensively proofed, as such there could be " errors within the text such as sound alike errors.

## 2019-07-23 NOTE — PATIENT INSTRUCTIONS
"  Dental Cavity    A dental cavity is a pit or crater in the surface of a tooth. This exposes the sensitive inner layer of the tooth and causes pain. If the cavity isnt treated, it will get bigger. It may enter the pulp and cause an infection or abscess in the bone at the root end (apex) of the tooth. An infection in the tooth is a much more serious problem than a cavity. If the tooth gets infected, you will need a root canal or the entire tooth taken out (extraction).  The pain in your tooth may be made worse by eating sweets or drinking hot or cold beverages. It may spread from the tooth to your ear or the area of your jaw on the same side.  Home care  Follow these tips when caring for yourself at home:  · Avoid sweets and hot and cold foods and drinks. Your tooth may be sensitive to changes in temperature.  · If your tooth is chipped or cracked, or if there is a large open cavity, put oil of cloves directly on the tooth to relieve pain. You can buy oil of cloves at drugstores. Some pharmacies carry an over-the-counter "toothache kit." This contains a paste that you can put on the exposed tooth to make it less sensitive.  · Put a cold pack on your jaw over the sore area to help reduce pain.  · You may use over-the-counter medicine to ease pain, unless another medicine was prescribed. If you have chronic liver or kidney disease, talk with your healthcare provider before using acetaminophen or ibuprofen. Also talk with your provider if youve had a stomach ulcer or GI bleeding.  · If you have signs of an infection, you will be given an antibiotic. Take it as directed.  Follow-up care  Follow up with your dentist, or as advised. Your pain may go away with the treatment given today. But only a dentist can fully look at and treat this problem to prevent further tooth damage.  Call 911  Call 911 if any of these occur:  · Difficulty swallowing or breathing  · Weakness or fainting  · Unusual drowsiness  · Headache or " stiff neck  When to seek medical advice  Call your healthcare provider right away if any of these occur:  · Redness or swelling of the face  · Pain gets worse or spreads to your neck  · Fever of 100.5 °F (38°C) or higher, or as directed by your healthcare provider  · Pus drains from the tooth or gum  Date Last Reviewed: 10/1/2016  © 5164-4415 [employer]. [address] [place] [address], [place] [address]. All rights reserved. This information is not intended as a substitute for professional medical care. Always follow your healthcare professional's instructions.

## 2019-09-11 ENCOUNTER — TELEPHONE (OUTPATIENT)
Dept: INTERNAL MEDICINE | Facility: CLINIC | Age: 39
End: 2019-09-11

## 2019-09-11 NOTE — TELEPHONE ENCOUNTER
----- Message from Zulema Barnhart sent at 9/11/2019  4:09 PM CDT -----  Contact: [phone]   Pt would like a call from nurse in regards to medication refill for antibiotics for a tooth ache. Please call back at [phone].       [employer] [facility] [id number] [facility] [address]  Phone: [phone] Fax: [phone]      Thank You,   Zulema Barnhart

## 2019-09-11 NOTE — TELEPHONE ENCOUNTER
Patient called in regards to wanting an antibiotic called in to his [facility] pharmacy for his tooth ache swelling. LOV: 07/23/19.      Please Advise

## 2019-09-12 ENCOUNTER — IMMUNIZATION (OUTPATIENT)
Dept: INTERNAL MEDICINE | Facility: CLINIC | Age: 39
End: 2019-09-12
Payer: MEDICARE

## 2019-09-12 ENCOUNTER — OFFICE VISIT (OUTPATIENT)
Dept: INTERNAL MEDICINE | Facility: CLINIC | Age: 39
End: 2019-09-12
Payer: MEDICARE

## 2019-09-12 VITALS
BODY MASS INDEX: 36.54 KG/M2 | HEIGHT: 67 IN | TEMPERATURE: 98 F | WEIGHT: 232.81 LBS | OXYGEN SATURATION: 96 % | HEART RATE: 82 BPM | SYSTOLIC BLOOD PRESSURE: 112 MMHG | DIASTOLIC BLOOD PRESSURE: 70 MMHG | RESPIRATION RATE: 18 BRPM

## 2019-09-12 DIAGNOSIS — K02.9 DENTAL CARIES: Primary | ICD-10-CM

## 2019-09-12 DIAGNOSIS — Z28.9 DELAYED VACCINATION: ICD-10-CM

## 2019-09-12 PROCEDURE — 90686 IIV4 VACC NO PRSV 0.5 ML IM: CPT | Mod: PBBFAC,PO

## 2019-09-12 PROCEDURE — 99213 OFFICE O/P EST LOW 20 MIN: CPT | Mod: S$PBB,25,, | Performed by: NURSE PRACTITIONER

## 2019-09-12 PROCEDURE — 99213 OFFICE O/P EST LOW 20 MIN: CPT | Mod: PBBFAC,PO,25 | Performed by: NURSE PRACTITIONER

## 2019-09-12 PROCEDURE — 99999 PR PBB SHADOW E&M-EST. PATIENT-LVL III: ICD-10-PCS | Mod: PBBFAC,,, | Performed by: NURSE PRACTITIONER

## 2019-09-12 PROCEDURE — 99999 PR PBB SHADOW E&M-EST. PATIENT-LVL III: CPT | Mod: PBBFAC,,, | Performed by: NURSE PRACTITIONER

## 2019-09-12 PROCEDURE — 99213 PR OFFICE/OUTPT VISIT, EST, LEVL III, 20-29 MIN: ICD-10-PCS | Mod: S$PBB,25,, | Performed by: NURSE PRACTITIONER

## 2019-09-12 PROCEDURE — G0009 ADMIN PNEUMOCOCCAL VACCINE: HCPCS | Mod: PBBFAC,PO

## 2019-09-12 RX ORDER — IBUPROFEN 800 MG/1
800 TABLET ORAL 3 TIMES DAILY PRN
Qty: 30 TABLET | Refills: 0 | Status: SHIPPED | OUTPATIENT
Start: 2019-09-12 | End: 2019-11-21

## 2019-09-12 RX ORDER — PENICILLIN V POTASSIUM 500 MG/1
500 TABLET, FILM COATED ORAL EVERY 6 HOURS
Qty: 28 TABLET | Refills: 0 | Status: SHIPPED | OUTPATIENT
Start: 2019-09-12 | End: 2019-09-19

## 2019-09-12 NOTE — PROGRESS NOTES
Subjective:       Patient ID: Philipp Gonzales is a 39 y.o. male.    Chief Complaint: Oral Swelling    Mr Gonzales is here today for dental pain and jaw swelling. Onset dental pain approx 1 week ago to R lower gum line. Now c/o L sided jaw swelling and intermittent pain approx 2 days ago. R side has resolved at this time. Ibuprofen taken today with mild relief. Pain rated 2/10 on numeric pain, described as aching pressure. Plans to see dentist soon since he recently found out that there are local dentist that will accept his insurance. Has been treated for dental infection in May and July, but has not been able to see dentist yet due to insurance problems. Denies fever, fatigue, body aches, foul taste or odor from mouth, tenderness to swollen area, or drainage.       Patient Active Problem List   Diagnosis    Dental caries       Family History   Problem Relation Age of Onset    Macular degeneration Mother     Macular degeneration Maternal Grandfather      History reviewed. No pertinent surgical history.      Current Outpatient Medications:     escitalopram oxalate (LEXAPRO) 20 MG tablet, TAKE ONE TABLET BY MOUTH EVERY DAY, Disp: , Rfl:     ibuprofen (ADVIL,MOTRIN) 800 MG tablet, Take 1 tablet (800 mg total) by mouth 3 (three) times daily as needed for Pain., Disp: 30 tablet, Rfl: 0    traZODone (DESYREL) 100 MG tablet, Take 100 mg by mouth nightly., Disp: , Rfl: 2    penicillin v potassium (VEETID) 500 MG tablet, Take 1 tablet (500 mg total) by mouth every 6 (six) hours. for 7 days, Disp: 28 tablet, Rfl: 0    Review of Systems   Constitutional: Negative for activity change, appetite change, chills, diaphoresis, fatigue and fever.   HENT: Positive for dental problem and facial swelling (L side, jaw line). Negative for congestion, drooling, postnasal drip, rhinorrhea, sinus pressure, sinus pain, sore throat, trouble swallowing and voice change.    Eyes: Negative for visual disturbance.   Respiratory: Negative for  "cough, chest tightness, shortness of breath and wheezing.    Cardiovascular: Negative for chest pain and palpitations.   Gastrointestinal: Negative for abdominal pain, nausea and vomiting.   Musculoskeletal: Negative for arthralgias, back pain, gait problem, myalgias, neck pain and neck stiffness.   Skin: Negative for color change, pallor and rash.   Neurological: Negative for dizziness, light-headedness and headaches.       Objective:   /70 (BP Location: Right arm, Patient Position: Sitting, BP Method: Large (Manual))   Pulse 82   Temp 98.3 °F (36.8 °C) (Oral)   Resp 18   Ht 5' 7" (1.702 m)   Wt 105.6 kg (232 lb 12.9 oz)   SpO2 96%   BMI 36.46 kg/m²      Physical Exam   Constitutional: He is oriented to person, place, and time. He appears well-developed and well-nourished. He is cooperative. He does not appear ill. No distress.   HENT:   Head: Normocephalic and atraumatic.   Right Ear: Tympanic membrane and ear canal normal.   Left Ear: Tympanic membrane and ear canal normal.   Nose: Nose normal. No mucosal edema or rhinorrhea. Right sinus exhibits no maxillary sinus tenderness and no frontal sinus tenderness. Left sinus exhibits no maxillary sinus tenderness and no frontal sinus tenderness.   Mouth/Throat: Oropharynx is clear and moist and mucous membranes are normal. No oral lesions. Dental caries (significant to lower teeth, had upper teeth pulled) present. No oropharyngeal exudate.   Moderate swelling to L side of lower jaw line, no fluctuance noted. Negative for drainage. Minimal tenderness present.    Eyes: Pupils are equal, round, and reactive to light. Conjunctivae and EOM are normal.   Neck: Normal range of motion. Neck supple.   Cardiovascular: Normal rate, regular rhythm, normal heart sounds and intact distal pulses. Exam reveals no gallop and no friction rub.   No murmur heard.  Pulmonary/Chest: Effort normal and breath sounds normal. No respiratory distress. He has no wheezes. He has no " rales.   Musculoskeletal: Normal range of motion. He exhibits no edema, tenderness or deformity.   Lymphadenopathy:     He has no cervical adenopathy.   Neurological: He is alert and oriented to person, place, and time. No cranial nerve deficit.   Skin: Skin is warm and dry. Capillary refill takes less than 2 seconds. No rash noted. He is not diaphoretic. No erythema. No pallor.   Psychiatric: He has a normal mood and affect. His behavior is normal.   Vitals reviewed.      Assessment & Plan     Problem List Items Addressed This Visit        ENT    Dental caries - Primary    Current Assessment & Plan     Discussed importance of making appt with dentist for further treatment of recurrent dental problems due to poor dentition. He states that he has finally found a dentist that will take his insurance and plans to make appt soon. Rx sent for Pen VK and ibuprofren.          Relevant Medications    ibuprofen (ADVIL,MOTRIN) 800 MG tablet    penicillin v potassium (VEETID) 500 MG tablet      Other Visit Diagnoses     Delayed vaccination        Relevant Orders    (In Office Administered) Pneumococcal Polysaccharide Vaccine (23 Valent) (SQ/IM) (Completed)           Follow up if symptoms worsen or fail to improve, for and needs to schedule annual wellness exam for labs.

## 2019-09-12 NOTE — TELEPHONE ENCOUNTER
We are not able to refill medications from the ER. He needs to see a dentist, or have another office visit.

## 2019-09-13 ENCOUNTER — TELEPHONE (OUTPATIENT)
Dept: INTERNAL MEDICINE | Facility: CLINIC | Age: 39
End: 2019-09-13

## 2019-09-13 NOTE — TELEPHONE ENCOUNTER
Wife called stating pt was seen on Thursday and receive a pneumonia and flu shot and now experiencing 101.3 oral fever and  Bodyaches. Advise

## 2019-09-13 NOTE — TELEPHONE ENCOUNTER
----- Message from Lily Felix sent at 9/13/2019 11:14 AM CDT -----  Contact:   .Type:  Needs Medical Advice    Who Called:     Symptoms (please be specific):  Aches and pains    How long has patient had these symptoms:   Since visit on 9/12   Pharmacy name and phone #:       [facility] [address] [address]  [address] [facility] [address] [address]  Phone: [phone] Fax: [phone]    Would the patient rather a call back or a response via [facility]?  Call back   Best Call Back Number:   [phone]   Additional Information:  Pt was given a flu and pneumonia shot  And wants to know if its normal , pt also has an infection in his tooth

## 2019-09-13 NOTE — ASSESSMENT & PLAN NOTE
Discussed importance of making appt with dentist for further treatment of recurrent dental problems due to poor dentition. He states that he has finally found a dentist that will take his insurance and plans to make appt soon. Rx sent for Pen VK and ibuprofren.

## 2019-09-13 NOTE — TELEPHONE ENCOUNTER
These symptoms could be an adverse reaction to pneumonia vaccination or from the dental infection. I recommend continuing tylenol and ibuprofen for pain, fever, and body aches, and continue antibiotics for dental infection. Symptoms should improve. Continue to monitor over next 24-48 hours. If s/s do not improve, return to clinic.

## 2019-10-08 ENCOUNTER — TELEPHONE (OUTPATIENT)
Dept: OPHTHALMOLOGY | Facility: CLINIC | Age: 39
End: 2019-10-08

## 2019-11-21 ENCOUNTER — OFFICE VISIT (OUTPATIENT)
Dept: INTERNAL MEDICINE | Facility: CLINIC | Age: 39
End: 2019-11-21
Payer: MEDICARE

## 2019-11-21 ENCOUNTER — LAB VISIT (OUTPATIENT)
Dept: LAB | Facility: HOSPITAL | Age: 39
End: 2019-11-21
Attending: FAMILY MEDICINE
Payer: MEDICARE

## 2019-11-21 VITALS
OXYGEN SATURATION: 95 % | HEIGHT: 67 IN | RESPIRATION RATE: 16 BRPM | TEMPERATURE: 98 F | HEART RATE: 87 BPM | DIASTOLIC BLOOD PRESSURE: 79 MMHG | BODY MASS INDEX: 37.16 KG/M2 | WEIGHT: 236.75 LBS | SYSTOLIC BLOOD PRESSURE: 129 MMHG

## 2019-11-21 DIAGNOSIS — M54.41 ACUTE RIGHT-SIDED LOW BACK PAIN WITH RIGHT-SIDED SCIATICA: Primary | ICD-10-CM

## 2019-11-21 DIAGNOSIS — K02.9 DENTAL CARIES: ICD-10-CM

## 2019-11-21 DIAGNOSIS — E66.9 OBESITY (BMI 35.0-39.9 WITHOUT COMORBIDITY): ICD-10-CM

## 2019-11-21 PROBLEM — F41.1 GAD (GENERALIZED ANXIETY DISORDER): Status: ACTIVE | Noted: 2019-01-17

## 2019-11-21 PROBLEM — K21.9 GASTROESOPHAGEAL REFLUX DISEASE WITHOUT ESOPHAGITIS: Status: ACTIVE | Noted: 2017-05-26

## 2019-11-21 PROBLEM — E78.5 HYPERLIPIDEMIA: Status: ACTIVE | Noted: 2017-05-26

## 2019-11-21 PROBLEM — F90.9 ATTENTION DEFICIT HYPERACTIVITY DISORDER: Status: ACTIVE | Noted: 2019-01-17

## 2019-11-21 LAB
ALBUMIN SERPL BCP-MCNC: 3.8 G/DL (ref 3.5–5.2)
ALP SERPL-CCNC: 68 U/L (ref 55–135)
ALT SERPL W/O P-5'-P-CCNC: 60 U/L (ref 10–44)
ANION GAP SERPL CALC-SCNC: 8 MMOL/L (ref 8–16)
AST SERPL-CCNC: 33 U/L (ref 10–40)
BILIRUB SERPL-MCNC: 0.4 MG/DL (ref 0.1–1)
BUN SERPL-MCNC: 13 MG/DL (ref 6–20)
CALCIUM SERPL-MCNC: 9.5 MG/DL (ref 8.7–10.5)
CHLORIDE SERPL-SCNC: 105 MMOL/L (ref 95–110)
CO2 SERPL-SCNC: 28 MMOL/L (ref 23–29)
CREAT SERPL-MCNC: 0.9 MG/DL (ref 0.5–1.4)
EST. GFR  (AFRICAN AMERICAN): >60 ML/MIN/1.73 M^2
EST. GFR  (NON AFRICAN AMERICAN): >60 ML/MIN/1.73 M^2
GLUCOSE SERPL-MCNC: 95 MG/DL (ref 70–110)
POTASSIUM SERPL-SCNC: 4.1 MMOL/L (ref 3.5–5.1)
PROT SERPL-MCNC: 7.5 G/DL (ref 6–8.4)
SODIUM SERPL-SCNC: 141 MMOL/L (ref 136–145)

## 2019-11-21 PROCEDURE — 99214 OFFICE O/P EST MOD 30 MIN: CPT | Mod: S$PBB,,, | Performed by: FAMILY MEDICINE

## 2019-11-21 PROCEDURE — 99999 PR PBB SHADOW E&M-EST. PATIENT-LVL III: CPT | Mod: PBBFAC,,, | Performed by: FAMILY MEDICINE

## 2019-11-21 PROCEDURE — 80053 COMPREHEN METABOLIC PANEL: CPT | Mod: PO

## 2019-11-21 PROCEDURE — 99213 OFFICE O/P EST LOW 20 MIN: CPT | Mod: PBBFAC,PO | Performed by: FAMILY MEDICINE

## 2019-11-21 PROCEDURE — 99214 PR OFFICE/OUTPT VISIT, EST, LEVL IV, 30-39 MIN: ICD-10-PCS | Mod: S$PBB,,, | Performed by: FAMILY MEDICINE

## 2019-11-21 PROCEDURE — 96372 THER/PROPH/DIAG INJ SC/IM: CPT | Mod: PBBFAC,PO

## 2019-11-21 PROCEDURE — 80061 LIPID PANEL: CPT

## 2019-11-21 PROCEDURE — 36415 COLL VENOUS BLD VENIPUNCTURE: CPT | Mod: PO

## 2019-11-21 PROCEDURE — 99999 PR PBB SHADOW E&M-EST. PATIENT-LVL III: ICD-10-PCS | Mod: PBBFAC,,, | Performed by: FAMILY MEDICINE

## 2019-11-21 RX ORDER — AMOXICILLIN AND CLAVULANATE POTASSIUM 875; 125 MG/1; MG/1
1 TABLET, FILM COATED ORAL EVERY 12 HOURS
Qty: 14 TABLET | Refills: 0 | Status: SHIPPED | OUTPATIENT
Start: 2019-11-21 | End: 2019-11-28

## 2019-11-21 RX ORDER — KETOROLAC TROMETHAMINE 30 MG/ML
30 INJECTION, SOLUTION INTRAMUSCULAR; INTRAVENOUS ONCE
Status: COMPLETED | OUTPATIENT
Start: 2019-11-21 | End: 2019-11-21

## 2019-11-21 RX ADMIN — KETOROLAC TROMETHAMINE 30 MG: 30 INJECTION, SOLUTION INTRAMUSCULAR; INTRAVENOUS at 03:11

## 2019-11-21 NOTE — ASSESSMENT & PLAN NOTE
Counseled on importance on diet and exercise to decrease risk of comorbid conditions and for improved quality of life.  Due for lipids and cmp

## 2019-11-21 NOTE — PROGRESS NOTES
"Subjective:       Patient ID: Philipp Gonzales is a 39 y.o. male.    Chief Complaint: Back Pain (shooting into leg) and Facial Swelling (Mouth)    HPI    Family History   Problem Relation Age of Onset    Macular degeneration Mother     Macular degeneration Maternal Grandfather        Current Outpatient Medications:     escitalopram oxalate (LEXAPRO) 20 MG tablet, TAKE ONE TABLET BY MOUTH EVERY DAY, Disp: , Rfl:     traZODone (DESYREL) 100 MG tablet, Take 100 mg by mouth nightly., Disp: , Rfl: 2    amoxicillin-clavulanate 875-125mg (AUGMENTIN) 875-125 mg per tablet, Take 1 tablet by mouth every 12 (twelve) hours. for 7 days, Disp: 14 tablet, Rfl: 0    Current Facility-Administered Medications:     ketorolac injection 30 mg, 30 mg, Intramuscular, Once, Adrian Lacy DO    Review of Systems   Constitutional: Negative for chills and fever.   HENT: Positive for dental problem.    Eyes: Negative for visual disturbance.   Respiratory: Negative for cough and shortness of breath.    Cardiovascular: Negative for chest pain.   Gastrointestinal: Negative for abdominal pain.   Musculoskeletal: Positive for back pain.   Neurological: Negative for dizziness.       Objective:   /79 (BP Location: Left arm, Patient Position: Sitting, BP Method: Medium (Automatic))   Pulse 87   Temp 97.8 °F (36.6 °C) (Tympanic)   Resp 16   Ht 5' 7" (1.702 m)   Wt 107.4 kg (236 lb 12.4 oz)   SpO2 95%   BMI 37.08 kg/m²      Physical Exam   Constitutional: He is oriented to person, place, and time. He appears well-developed and well-nourished.   HENT:   Head: Normocephalic and atraumatic.   No obvious abscess at this time but has multiple dental cavities and decayed teeth.  Inflammation of left mandible likely representing early infection.   Eyes: Conjunctivae are normal.   Cardiovascular: Normal rate.   Pulmonary/Chest: Effort normal. No respiratory distress.   Musculoskeletal: He exhibits no edema.   Tenderness to palpation around " the right SI joint.  No muscle spasm.  No edema nor erythema.   Neurological: He is alert and oriented to person, place, and time. Coordination normal.   Skin: Skin is warm and dry. No rash noted.   Psychiatric: He has a normal mood and affect. His behavior is normal.   Vitals reviewed.      Assessment & Plan     Problem List Items Addressed This Visit        ENT    Dental caries    Current Assessment & Plan       Continues to have dental problems.  Concern for worsening infection.  Treating with Augmentin            Endocrine    Obesity (BMI 35.0-39.9 without comorbidity)    Current Assessment & Plan     Counseled on importance on diet and exercise to decrease risk of comorbid conditions and for improved quality of life.  Due for lipids and cmp         Relevant Orders    Lipid panel    Comprehensive metabolic panel       Orthopedic    Acute right-sided low back pain with right-sided sciatica - Primary    Current Assessment & Plan     toradol injection today                  No follow-ups on file.    Disclaimer:  This note may have been prepared using voice recognition software, it may have not been extensively proofed, as such there could be errors within the text such as sound alike errors.

## 2019-11-22 ENCOUNTER — TELEPHONE (OUTPATIENT)
Dept: INTERNAL MEDICINE | Facility: CLINIC | Age: 39
End: 2019-11-22

## 2019-11-22 LAB
CHOLEST SERPL-MCNC: 201 MG/DL (ref 120–199)
CHOLEST/HDLC SERPL: 7.2 {RATIO} (ref 2–5)
HDLC SERPL-MCNC: 28 MG/DL (ref 40–75)
HDLC SERPL: 13.9 % (ref 20–50)
LDLC SERPL CALC-MCNC: ABNORMAL MG/DL (ref 63–159)
NONHDLC SERPL-MCNC: 173 MG/DL
TRIGL SERPL-MCNC: 437 MG/DL (ref 30–150)

## 2019-11-22 RX ORDER — FENOFIBRATE 54 MG/1
54 TABLET ORAL DAILY
Qty: 90 TABLET | Refills: 3 | Status: SHIPPED | OUTPATIENT
Start: 2019-11-22 | End: 2021-01-22

## 2019-11-22 NOTE — TELEPHONE ENCOUNTER
----- Message from Adrian Lacy DO sent at 11/22/2019  4:44 PM CST -----  Triglycerides elevated. Starting on fenofibrate

## 2019-12-14 ENCOUNTER — NURSE TRIAGE (OUTPATIENT)
Dept: ADMINISTRATIVE | Facility: CLINIC | Age: 39
End: 2019-12-14

## 2020-01-08 ENCOUNTER — NURSE TRIAGE (OUTPATIENT)
Dept: ADMINISTRATIVE | Facility: CLINIC | Age: 40
End: 2020-01-08

## 2020-01-08 ENCOUNTER — HOSPITAL ENCOUNTER (EMERGENCY)
Facility: HOSPITAL | Age: 40
Discharge: HOME | End: 2020-01-08
Attending: EMERGENCY MEDICINE
Payer: MEDICARE

## 2020-01-08 ENCOUNTER — TELEPHONE (OUTPATIENT)
Dept: INTERNAL MEDICINE | Facility: CLINIC | Age: 40
End: 2020-01-08

## 2020-01-08 VITALS
OXYGEN SATURATION: 96 % | DIASTOLIC BLOOD PRESSURE: 82 MMHG | BODY MASS INDEX: 34.27 KG/M2 | SYSTOLIC BLOOD PRESSURE: 146 MMHG | HEART RATE: 92 BPM | WEIGHT: 218.81 LBS | TEMPERATURE: 99 F | RESPIRATION RATE: 20 BRPM

## 2020-01-08 DIAGNOSIS — Z72.0 TOBACCO ABUSE: ICD-10-CM

## 2020-01-08 DIAGNOSIS — E78.5 HYPERLIPIDEMIA, UNSPECIFIED HYPERLIPIDEMIA TYPE: Primary | ICD-10-CM

## 2020-01-08 DIAGNOSIS — R07.9 CHEST PAIN: ICD-10-CM

## 2020-01-08 LAB
ALBUMIN SERPL BCP-MCNC: 4.2 G/DL (ref 3.5–5.2)
ALP SERPL-CCNC: 71 U/L (ref 55–135)
ALT SERPL W/O P-5'-P-CCNC: 72 U/L (ref 10–44)
ANION GAP SERPL CALC-SCNC: 11 MMOL/L (ref 8–16)
AST SERPL-CCNC: 41 U/L (ref 10–40)
BASOPHILS # BLD AUTO: 0.05 K/UL (ref 0–0.2)
BASOPHILS NFR BLD: 0.4 % (ref 0–1.9)
BILIRUB SERPL-MCNC: 0.8 MG/DL (ref 0.1–1)
BNP SERPL-MCNC: <10 PG/ML (ref 0–99)
BUN SERPL-MCNC: 15 MG/DL (ref 6–20)
CALCIUM SERPL-MCNC: 10.1 MG/DL (ref 8.7–10.5)
CHLORIDE SERPL-SCNC: 105 MMOL/L (ref 95–110)
CO2 SERPL-SCNC: 24 MMOL/L (ref 23–29)
CREAT SERPL-MCNC: 1 MG/DL (ref 0.5–1.4)
DIFFERENTIAL METHOD: ABNORMAL
EOSINOPHIL # BLD AUTO: 0.1 K/UL (ref 0–0.5)
EOSINOPHIL NFR BLD: 0.6 % (ref 0–8)
ERYTHROCYTE [DISTWIDTH] IN BLOOD BY AUTOMATED COUNT: 12.6 % (ref 11.5–14.5)
EST. GFR  (AFRICAN AMERICAN): >60 ML/MIN/1.73 M^2
EST. GFR  (NON AFRICAN AMERICAN): >60 ML/MIN/1.73 M^2
GLUCOSE SERPL-MCNC: 142 MG/DL (ref 70–110)
HCT VFR BLD AUTO: 51.8 % (ref 40–54)
HGB BLD-MCNC: 17.9 G/DL (ref 14–18)
IMM GRANULOCYTES # BLD AUTO: 0.05 K/UL (ref 0–0.04)
IMM GRANULOCYTES NFR BLD AUTO: 0.4 % (ref 0–0.5)
LYMPHOCYTES # BLD AUTO: 2.3 K/UL (ref 1–4.8)
LYMPHOCYTES NFR BLD: 18.9 % (ref 18–48)
MCH RBC QN AUTO: 30.2 PG (ref 27–31)
MCHC RBC AUTO-ENTMCNC: 34.6 G/DL (ref 32–36)
MCV RBC AUTO: 88 FL (ref 82–98)
MONOCYTES # BLD AUTO: 0.5 K/UL (ref 0.3–1)
MONOCYTES NFR BLD: 3.9 % (ref 4–15)
NEUTROPHILS # BLD AUTO: 9.2 K/UL (ref 1.8–7.7)
NEUTROPHILS NFR BLD: 75.8 % (ref 38–73)
NRBC BLD-RTO: 0 /100 WBC
PLATELET # BLD AUTO: 279 K/UL (ref 150–350)
PMV BLD AUTO: 9.9 FL (ref 9.2–12.9)
POTASSIUM SERPL-SCNC: 3.6 MMOL/L (ref 3.5–5.1)
PROT SERPL-MCNC: 8.2 G/DL (ref 6–8.4)
RBC # BLD AUTO: 5.92 M/UL (ref 4.6–6.2)
SODIUM SERPL-SCNC: 140 MMOL/L (ref 136–145)
TROPONIN I SERPL DL<=0.01 NG/ML-MCNC: <0.006 NG/ML (ref 0–0.03)
WBC # BLD AUTO: 12.19 K/UL (ref 3.9–12.7)

## 2020-01-08 PROCEDURE — 83880 ASSAY OF NATRIURETIC PEPTIDE: CPT | Mod: ER

## 2020-01-08 PROCEDURE — 99285 EMERGENCY DEPT VISIT HI MDM: CPT | Mod: 25,ER

## 2020-01-08 PROCEDURE — 25000003 PHARM REV CODE 250: Mod: ER | Performed by: EMERGENCY MEDICINE

## 2020-01-08 PROCEDURE — 80053 COMPREHEN METABOLIC PANEL: CPT | Mod: ER

## 2020-01-08 PROCEDURE — 84484 ASSAY OF TROPONIN QUANT: CPT | Mod: ER

## 2020-01-08 PROCEDURE — 93005 ELECTROCARDIOGRAM TRACING: CPT | Mod: ER

## 2020-01-08 PROCEDURE — 85025 COMPLETE CBC W/AUTO DIFF WBC: CPT | Mod: ER

## 2020-01-08 PROCEDURE — 93010 EKG 12-LEAD: ICD-10-PCS | Mod: ,,, | Performed by: INTERNAL MEDICINE

## 2020-01-08 PROCEDURE — 93010 ELECTROCARDIOGRAM REPORT: CPT | Mod: ,,, | Performed by: INTERNAL MEDICINE

## 2020-01-08 RX ORDER — NAPROXEN SODIUM 220 MG/1
324 TABLET, FILM COATED ORAL
Status: COMPLETED | OUTPATIENT
Start: 2020-01-08 | End: 2020-01-08

## 2020-01-08 RX ORDER — MELOXICAM 7.5 MG/1
7.5 TABLET ORAL 2 TIMES DAILY
COMMUNITY
End: 2020-03-03 | Stop reason: SDUPTHER

## 2020-01-08 RX ADMIN — ASPIRIN 81 MG 324 MG: 81 TABLET ORAL at 09:01

## 2020-01-08 NOTE — TELEPHONE ENCOUNTER
"Philipp states he was awakened last night "with the most severe, sharp pain in the middle of my chest, and it was hard to get a deep breath.  I take sleeping medication and I don't wake up in the middle of the night easily, but the pain was frightening. It was constant, and still here this morning, but is duller.  It is like a pressure, a heaviness, and is on the right side of my chest, also in the middle of my chest."  Philipp is a current every day cigarette smoker, has HLD, strong family hx of heart disease, per self.  He said he is legally blind, does not drive, and is home alone, his family is out of town.  Per [facility] triage protocol, recommend hang up now and call 911. Message to Adrian Lacy DO , pcp.  Please contact caller directly with any additional care advice.      Reason for Disposition   Chest pain lasting longer than 5 minutes and ANY of the following:* Over 50 years old* Over 30 years old and at least one cardiac risk factor (i.e., high blood pressure, diabetes, high cholesterol, obesity, smoker or strong family history of heart disease)* Pain is crushing, pressure-like, or heavy * Took nitroglycerin and chest pain was not relieved* History of heart disease (i.e., angina, heart attack, bypass surgery, angioplasty, CHF)    Additional Information   Negative: Severe difficulty breathing (e.g., struggling for each breath, speaks in single words)   Negative: Passed out (i.e., fainted, collapsed and was not responding)    Protocols used: CHEST PAIN-A-[place]      "

## 2020-01-08 NOTE — TELEPHONE ENCOUNTER
----- Message from Zulema Barnhart sent at 1/8/2020  3:29 PM CST -----  Contact: Self- [phone]  .Type:  Sooner Apoointment Request    Caller is requesting a sooner appointment.  Caller declined first available appointment listed below.  Caller will not accept being placed on the waitlist and is requesting a message be sent to doctor.  Name of Caller:Philipp Gonzales  When is the first available appointment?1/23/20   Symptoms:Hospital follow up for chest pain   Would the patient rather a call back or a response via [facility]? Call back   Best Call Back Number:.[phone] (home)   Additional Information:     Thank You,   Zulema Barnhart

## 2020-01-10 ENCOUNTER — OFFICE VISIT (OUTPATIENT)
Dept: INTERNAL MEDICINE | Facility: CLINIC | Age: 40
End: 2020-01-10
Payer: MEDICARE

## 2020-01-10 VITALS
WEIGHT: 224.44 LBS | HEIGHT: 67 IN | SYSTOLIC BLOOD PRESSURE: 130 MMHG | HEART RATE: 97 BPM | DIASTOLIC BLOOD PRESSURE: 86 MMHG | BODY MASS INDEX: 35.22 KG/M2 | TEMPERATURE: 98 F | OXYGEN SATURATION: 95 %

## 2020-01-10 DIAGNOSIS — E78.5 HYPERLIPIDEMIA, UNSPECIFIED HYPERLIPIDEMIA TYPE: Primary | ICD-10-CM

## 2020-01-10 DIAGNOSIS — R07.9 CHEST PAIN, UNSPECIFIED TYPE: ICD-10-CM

## 2020-01-10 DIAGNOSIS — K08.89 PAIN, DENTAL: ICD-10-CM

## 2020-01-10 DIAGNOSIS — F41.1 GAD (GENERALIZED ANXIETY DISORDER): ICD-10-CM

## 2020-01-10 PROCEDURE — 99999 PR PBB SHADOW E&M-EST. PATIENT-LVL IV: CPT | Mod: PBBFAC,,, | Performed by: FAMILY MEDICINE

## 2020-01-10 PROCEDURE — 99214 OFFICE O/P EST MOD 30 MIN: CPT | Mod: PBBFAC,PO | Performed by: FAMILY MEDICINE

## 2020-01-10 PROCEDURE — 99214 OFFICE O/P EST MOD 30 MIN: CPT | Mod: S$PBB,,, | Performed by: FAMILY MEDICINE

## 2020-01-10 PROCEDURE — 99999 PR PBB SHADOW E&M-EST. PATIENT-LVL IV: ICD-10-PCS | Mod: PBBFAC,,, | Performed by: FAMILY MEDICINE

## 2020-01-10 PROCEDURE — 99214 PR OFFICE/OUTPT VISIT, EST, LEVL IV, 30-39 MIN: ICD-10-PCS | Mod: S$PBB,,, | Performed by: FAMILY MEDICINE

## 2020-01-10 RX ORDER — AMOXICILLIN AND CLAVULANATE POTASSIUM 875; 125 MG/1; MG/1
1 TABLET, FILM COATED ORAL EVERY 12 HOURS
Qty: 10 TABLET | Refills: 0 | Status: SHIPPED | OUTPATIENT
Start: 2020-01-10 | End: 2020-01-15

## 2020-01-10 NOTE — PROGRESS NOTES
Patient ID: Philipp Gonzales is a 39 y.o. male.    Chief Complaint: Hospital F/U (chest pain)    HPI 39-year-old male here for ED follow-up.  Presented 2 days ago with complaints of chest pain.  Chest x-ray and EKG with no acute process. Troponin, and BNP within normal limits. Reports 7 month history of intermittent chest pain that usually lasts 30 seconds. Happens a few hours after exertion. Has been occurring more frequently. No associated N/V or diaphoresis. Does report increased marital problems between he and his wife. 22 yr hx of tobacco use. Smokes 1.5 pk per day. Has HLD. Grandmother had MI, dad (heart disease), uncle (cardiac stents).     Family History   Problem Relation Age of Onset    Macular degeneration Mother     Macular degeneration Maternal Grandfather        Current Outpatient Medications:     escitalopram oxalate (LEXAPRO) 20 MG tablet, TAKE ONE TABLET BY MOUTH EVERY DAY, Disp: , Rfl:     fenofibrate (TRICOR) 54 MG tablet, Take 1 tablet (54 mg total) by mouth once daily., Disp: 90 tablet, Rfl: 3    meloxicam (MOBIC) 7.5 MG tablet, Take 7.5 mg by mouth 2 (two) times daily., Disp: , Rfl:     traZODone (DESYREL) 100 MG tablet, Take 100 mg by mouth nightly., Disp: , Rfl: 2    amoxicillin-clavulanate 875-125mg (AUGMENTIN) 875-125 mg per tablet, Take 1 tablet by mouth every 12 (twelve) hours. for 5 days, Disp: 10 tablet, Rfl: 0    Review of Systems   Constitutional: Negative for chills and fever.   HENT: Positive for dental problem. Negative for congestion and sore throat.    Eyes: Negative for visual disturbance.   Respiratory: Negative for shortness of breath and wheezing.    Cardiovascular: Positive for chest pain (intermittent).   Gastrointestinal: Negative for abdominal pain, diarrhea and nausea.   Endocrine: Negative for polydipsia and polyuria.   Musculoskeletal: Negative for arthralgias.   Skin: Negative for rash.   Neurological: Negative for dizziness and headaches.  "  Psychiatric/Behavioral: Negative for sleep disturbance. The patient is nervous/anxious.        Objective:   /86 (BP Location: Left arm, Patient Position: Sitting, BP Method: X-Large (Automatic))   Pulse 97   Temp 98 °F (36.7 °C) (Tympanic)   Ht 5' 7" (1.702 m)   Wt 101.8 kg (224 lb 6.9 oz)   SpO2 95%   BMI 35.15 kg/m²      Physical Exam   Constitutional: He is oriented to person, place, and time. He appears well-developed and well-nourished.   HENT:   Head: Normocephalic and atraumatic.   Nose: Nose normal.   Mouth/Throat: No oropharyngeal exudate.   Very poor dentition. No upper teeth. Multiple dental caries bottom teeth   Eyes: Conjunctivae are normal.   Neck: Normal range of motion.   Cardiovascular: Normal rate, regular rhythm and normal heart sounds.   Pulmonary/Chest: Effort normal and breath sounds normal.   Abdominal: Soft.   Musculoskeletal: Normal range of motion.   Neurological: He is alert and oriented to person, place, and time.   Skin: Skin is warm.   Psychiatric: He has a normal mood and affect. His behavior is normal.       Assessment & Plan     Problem List Items Addressed This Visit        Psychiatric    JERMAINE (generalized anxiety disorder)    Current Assessment & Plan     -has had recent stress, dealing with marital problems, does have risk factors for CAD. if cardiac etiology of chest pain is ruled out will focus more on anxiety as cause.  Can continue Lexapro for now            ENT    Pain, dental    Current Assessment & Plan     -says he plans to follow-up with dentist soon.         Relevant Medications    amoxicillin-clavulanate 875-125mg (AUGMENTIN) 875-125 mg per tablet       Cardiac/Vascular    Hyperlipidemia - Primary    Current Assessment & Plan     -continue fenofibrate            Other    Chest pain    Current Assessment & Plan     -approximately 7 month history of chest pain.  Per patient occurring more frequently.  Does have increased stress and anxiety which could be " contributing factor.  However due to family history of CAD, patient has hyperlipidemia and tobacco use, would like to rule out cardiac etiology         Relevant Orders    Ambulatory Referral to Cardiology

## 2020-01-10 NOTE — ASSESSMENT & PLAN NOTE
-approximately 7 month history of chest pain.  Per patient occurring more frequently.  Does have increased stress and anxiety which could be contributing factor.  However due to family history of CAD, patient has hyperlipidemia and tobacco use, would like to rule out cardiac etiology

## 2020-01-10 NOTE — ASSESSMENT & PLAN NOTE
-has had recent stress, dealing with marital problems, does have risk factors for CAD. if cardiac etiology of chest pain is ruled out will focus more on anxiety as cause.  Can continue Lexapro for now

## 2020-01-28 DIAGNOSIS — R07.9 CHEST PAIN, UNSPECIFIED TYPE: Primary | ICD-10-CM

## 2020-01-31 ENCOUNTER — CLINICAL SUPPORT (OUTPATIENT)
Dept: CARDIOLOGY | Facility: CLINIC | Age: 40
End: 2020-01-31
Payer: MEDICARE

## 2020-01-31 ENCOUNTER — OFFICE VISIT (OUTPATIENT)
Dept: CARDIOLOGY | Facility: CLINIC | Age: 40
End: 2020-01-31
Payer: MEDICARE

## 2020-01-31 VITALS
HEART RATE: 89 BPM | BODY MASS INDEX: 35.05 KG/M2 | DIASTOLIC BLOOD PRESSURE: 80 MMHG | SYSTOLIC BLOOD PRESSURE: 130 MMHG | WEIGHT: 223.75 LBS

## 2020-01-31 DIAGNOSIS — R94.31 ABNORMAL EKG: ICD-10-CM

## 2020-01-31 DIAGNOSIS — R07.9 CHEST PAIN, UNSPECIFIED TYPE: Primary | ICD-10-CM

## 2020-01-31 DIAGNOSIS — E78.5 HYPERLIPIDEMIA, UNSPECIFIED HYPERLIPIDEMIA TYPE: ICD-10-CM

## 2020-01-31 DIAGNOSIS — E66.9 OBESITY (BMI 35.0-39.9 WITHOUT COMORBIDITY): ICD-10-CM

## 2020-01-31 DIAGNOSIS — R07.9 CHEST PAIN, UNSPECIFIED TYPE: ICD-10-CM

## 2020-01-31 DIAGNOSIS — R07.2 PRECORDIAL PAIN: ICD-10-CM

## 2020-01-31 DIAGNOSIS — F41.1 GAD (GENERALIZED ANXIETY DISORDER): ICD-10-CM

## 2020-01-31 PROCEDURE — 93005 ELECTROCARDIOGRAM TRACING: CPT | Mod: PBBFAC | Performed by: INTERNAL MEDICINE

## 2020-01-31 PROCEDURE — 99213 OFFICE O/P EST LOW 20 MIN: CPT | Mod: PBBFAC,25 | Performed by: INTERNAL MEDICINE

## 2020-01-31 PROCEDURE — 99999 PR PBB SHADOW E&M-EST. PATIENT-LVL III: ICD-10-PCS | Mod: PBBFAC,,, | Performed by: INTERNAL MEDICINE

## 2020-01-31 PROCEDURE — 99999 PR PBB SHADOW E&M-EST. PATIENT-LVL III: CPT | Mod: PBBFAC,,, | Performed by: INTERNAL MEDICINE

## 2020-01-31 PROCEDURE — 99204 PR OFFICE/OUTPT VISIT, NEW, LEVL IV, 45-59 MIN: ICD-10-PCS | Mod: S$PBB,25,, | Performed by: INTERNAL MEDICINE

## 2020-01-31 PROCEDURE — 93010 EKG 12-LEAD: ICD-10-PCS | Mod: S$PBB,,, | Performed by: INTERNAL MEDICINE

## 2020-01-31 PROCEDURE — 99204 OFFICE O/P NEW MOD 45 MIN: CPT | Mod: S$PBB,25,, | Performed by: INTERNAL MEDICINE

## 2020-01-31 PROCEDURE — 93010 ELECTROCARDIOGRAM REPORT: CPT | Mod: S$PBB,,, | Performed by: INTERNAL MEDICINE

## 2020-01-31 RX ORDER — HYDROXYZINE PAMOATE 25 MG/1
25 CAPSULE ORAL
COMMUNITY
Start: 2020-01-29 | End: 2022-03-14

## 2020-01-31 NOTE — PROGRESS NOTES
Subjective:   Patient ID:  Philipp Gonzales is a 39 y.o. male who presents for evaluation of Consult      HPI  A 40 yo legally blind hlp is here refeered from DR FAULKNER FOR RT SIDED CHEST PAIN. HE IS IN THE MIDDLE OF DIVORCE HE HAD A RT SIDED CHEST PAIN FELT LIKE PULLING SENSATION LASTED A MINUTE HE FELT LIKE A PANIC ATTACK. HE HAS NO ASSOCIATED SYMPTOMS HAS NO SHORTNESS OF BREATH HAS NO PALPITATION. HE DOES NOT SNORE. EKG NO ISCHEMIA BORDERLINE LVH.  Past Medical History:   Diagnosis Date    Anxiety     High cholesterol     Hyperlipidemia     Insomnia     Legally blind     Macular degeneration        Past Surgical History:   Procedure Laterality Date    HIP SURGERY      as child left       Social History     Tobacco Use    Smoking status: Current Every Day Smoker     Packs/day: 1.00     Types: Cigarettes    Smokeless tobacco: Never Used   Substance Use Topics    Alcohol use: Yes     Comment: rarely    Drug use: No       Family History   Problem Relation Age of Onset    Macular degeneration Mother     Macular degeneration Maternal Grandfather        Current Outpatient Medications   Medication Sig    escitalopram oxalate (LEXAPRO) 20 MG tablet TAKE ONE TABLET BY MOUTH EVERY DAY    fenofibrate (TRICOR) 54 MG tablet Take 1 tablet (54 mg total) by mouth once daily.    hydrOXYzine pamoate (VISTARIL) 25 MG Cap Take 25 mg by mouth as needed.    meloxicam (MOBIC) 7.5 MG tablet Take 7.5 mg by mouth 2 (two) times daily.    traZODone (DESYREL) 100 MG tablet Take 100 mg by mouth nightly.     No current facility-administered medications for this visit.      Current Outpatient Medications on File Prior to Visit   Medication Sig    escitalopram oxalate (LEXAPRO) 20 MG tablet TAKE ONE TABLET BY MOUTH EVERY DAY    fenofibrate (TRICOR) 54 MG tablet Take 1 tablet (54 mg total) by mouth once daily.    hydrOXYzine pamoate (VISTARIL) 25 MG Cap Take 25 mg by mouth as needed.    meloxicam (MOBIC) 7.5 MG tablet Take 7.5  mg by mouth 2 (two) times daily.    traZODone (DESYREL) 100 MG tablet Take 100 mg by mouth nightly.     No current facility-administered medications on file prior to visit.        Review of patient's allergies indicates:  No Known Allergies    Review of Systems   Constitution: Negative for malaise/fatigue.   Eyes: Negative for blurred vision.        LEGALLY BLIND   Cardiovascular: Positive for chest pain. Negative for claudication, cyanosis, dyspnea on exertion, irregular heartbeat, leg swelling, near-syncope, orthopnea, palpitations and paroxysmal nocturnal dyspnea.   Respiratory: Negative for cough, hemoptysis and shortness of breath.    Hematologic/Lymphatic: Negative for bleeding problem. Does not bruise/bleed easily.   Skin: Negative for dry skin and itching.   Musculoskeletal: Negative for falls, muscle weakness and myalgias.   Gastrointestinal: Negative for abdominal pain, diarrhea, heartburn, hematemesis, hematochezia and melena.   Genitourinary: Negative for flank pain and hematuria.   Neurological: Negative for dizziness, focal weakness, headaches, light-headedness, numbness, paresthesias, seizures and weakness.   Psychiatric/Behavioral: Negative for altered mental status and memory loss. The patient is not nervous/anxious.    Allergic/Immunologic: Negative for hives.       Objective:   Physical Exam   Constitutional: He is oriented to person, place, and time. He appears well-developed and well-nourished. No distress.   HENT:   Head: Normocephalic and atraumatic.   Eyes: Pupils are equal, round, and reactive to light. EOM are normal. Right eye exhibits no discharge. Left eye exhibits no discharge.   Neck: Neck supple. No JVD present. No thyromegaly present.   Cardiovascular: Normal rate, regular rhythm, normal heart sounds and intact distal pulses. Exam reveals no gallop and no friction rub.   No murmur heard.  Pulmonary/Chest: Effort normal and breath sounds normal. No respiratory distress. He has no  wheezes. He has no rales. He exhibits no tenderness.   Abdominal: Soft. Bowel sounds are normal. He exhibits no distension. There is no tenderness.   Musculoskeletal: Normal range of motion. He exhibits no edema.   Neurological: He is alert and oriented to person, place, and time. No cranial nerve deficit.   Skin: Skin is warm and dry. No rash noted. He is not diaphoretic. No erythema.   Psychiatric: He has a normal mood and affect. His behavior is normal.   Nursing note and vitals reviewed.    Vitals:    01/31/20 0911 01/31/20 0916   BP: 128/80 130/80   BP Location: Right arm Left arm   Patient Position: Sitting Sitting   BP Method: Medium (Manual) Medium (Manual)   Pulse: 89    Weight: 101.5 kg (223 lb 12.3 oz)      Lab Results   Component Value Date    CHOL 201 (H) 11/21/2019     Lab Results   Component Value Date    HDL 28 (L) 11/21/2019     Lab Results   Component Value Date    LDLCALC Invalid, Trig>400.0 11/21/2019     Lab Results   Component Value Date    TRIG 437 (H) 11/21/2019     Lab Results   Component Value Date    CHOLHDL 13.9 (L) 11/21/2019       Chemistry        Component Value Date/Time     01/08/2020 0935    K 3.6 01/08/2020 0935     01/08/2020 0935    CO2 24 01/08/2020 0935    BUN 15 01/08/2020 0935    CREATININE 1.0 01/08/2020 0935     (H) 01/08/2020 0935        Component Value Date/Time    CALCIUM 10.1 01/08/2020 0935    ALKPHOS 71 01/08/2020 0935    AST 41 (H) 01/08/2020 0935    ALT 72 (H) 01/08/2020 0935    BILITOT 0.8 01/08/2020 0935    ESTGFRAFRICA >60.0 01/08/2020 0935    EGFRNONAA >60.0 01/08/2020 0935          No results found for: TSH  No results found for: INR, PROTIME  Lab Results   Component Value Date    WBC 12.19 01/08/2020    HGB 17.9 01/08/2020    HCT 51.8 01/08/2020    MCV 88 01/08/2020     01/08/2020     BNP  @LABRCNTIP(BNP,BNPTRIAGEBLO)@  CrCl cannot be calculated (Patient's most recent lab result is older than the maximum 7 days allowed.).  Assessment:      1. Chest pain, unspecified type    2. JERMAINE (generalized anxiety disorder)    3. Hyperlipidemia, unspecified hyperlipidemia type    4. Obesity (BMI 35.0-39.9 without comorbidity)      I THINK HE HAS NON CARDIAC CHEST PAIN.  HOWEVER EKG SHOWS SOME LVH MAY HAVE LABILE BP WILL GET ECHO.  Plan:   ECHO  REASSURE.

## 2020-01-31 NOTE — LETTER
January 31, 2020      Betsy Levin MD  [id number] [address] [address]           [facility]  [address] [facility] [address]  Phone: [phone]  Fax: [phone]          Patient: Philipp Gonzales   MR Number: [id number]   YOB: 1980   Date of Visit: 1/31/2020       Dear Dr. Betsy Levin:    Thank you for referring Philipp Gonzales to me for evaluation. Attached you will find relevant portions of my assessment and plan of care.    If you have questions, please do not hesitate to call me. I look forward to following Philipp Gonzales along with you.    Sincerely,    Travon Pruett MD    Enclosure  CC:  No Recipients    If you would like to receive this communication electronically, please contact [email or web address] or [phone] to request more information on [employer] Link access.    For providers and/or their staff who would like to refer a patient to [facility], please contact us through our one-stop-shop provider referral line, [facility] , at [phone].    If you feel you have received this communication in error or would no longer like to receive these types of communications, please e-mail [email or web address]

## 2020-03-03 ENCOUNTER — OFFICE VISIT (OUTPATIENT)
Dept: INTERNAL MEDICINE | Facility: CLINIC | Age: 40
End: 2020-03-03
Payer: MEDICARE

## 2020-03-03 VITALS
HEIGHT: 67 IN | SYSTOLIC BLOOD PRESSURE: 120 MMHG | OXYGEN SATURATION: 95 % | RESPIRATION RATE: 17 BRPM | HEART RATE: 91 BPM | BODY MASS INDEX: 34.91 KG/M2 | TEMPERATURE: 97 F | WEIGHT: 222.44 LBS | DIASTOLIC BLOOD PRESSURE: 60 MMHG

## 2020-03-03 DIAGNOSIS — G89.29 CHRONIC PAIN OF RIGHT KNEE: Primary | ICD-10-CM

## 2020-03-03 DIAGNOSIS — Z72.0 TOBACCO USE: ICD-10-CM

## 2020-03-03 DIAGNOSIS — M25.561 CHRONIC PAIN OF RIGHT KNEE: Primary | ICD-10-CM

## 2020-03-03 PROCEDURE — 99214 OFFICE O/P EST MOD 30 MIN: CPT | Mod: S$PBB,,, | Performed by: NURSE PRACTITIONER

## 2020-03-03 PROCEDURE — 99999 PR PBB SHADOW E&M-EST. PATIENT-LVL IV: ICD-10-PCS | Mod: PBBFAC,,, | Performed by: NURSE PRACTITIONER

## 2020-03-03 PROCEDURE — 99214 OFFICE O/P EST MOD 30 MIN: CPT | Mod: PBBFAC,PO | Performed by: NURSE PRACTITIONER

## 2020-03-03 PROCEDURE — 99214 PR OFFICE/OUTPT VISIT, EST, LEVL IV, 30-39 MIN: ICD-10-PCS | Mod: S$PBB,,, | Performed by: NURSE PRACTITIONER

## 2020-03-03 PROCEDURE — 99999 PR PBB SHADOW E&M-EST. PATIENT-LVL IV: CPT | Mod: PBBFAC,,, | Performed by: NURSE PRACTITIONER

## 2020-03-03 RX ORDER — MELOXICAM 7.5 MG/1
7.5 TABLET ORAL 2 TIMES DAILY
Qty: 60 TABLET | Refills: 2 | Status: SHIPPED | OUTPATIENT
Start: 2020-03-03 | End: 2020-04-02

## 2020-03-03 NOTE — PROGRESS NOTES
Subjective:       Patient ID: Philipp Gonzales is a 39 y.o. male.    Chief Complaint: Knee Pain    Mr. Gonzales is here today chronic right knee pain that has been controlled previously with Mobic daily.  He has been out of his Mobic for 2 weeks, and pain started approximately 1 week ago.  Original knee pain started over 2 years ago after kneeling on concrete while working outdoors.  He is seen Ortho in the past for this, and was told her that it is most likely caused from his arthritis.  Previous knee x-rays show mild degenerative changes to knee.    Knee Pain    The incident occurred more than 1 week ago (out of mobic for past 2 weeks, pain started back approx 1 week). The incident occurred at home. There was no injury mechanism. The pain is present in the right knee. The quality of the pain is described as aching. The pain is at a severity of 3/10. The pain is mild. The pain has been constant since onset. Pertinent negatives include no inability to bear weight, loss of motion, loss of sensation, muscle weakness, numbness or tingling. He reports no foreign bodies present. The symptoms are aggravated by weight bearing and movement. He has tried acetaminophen for the symptoms. The treatment provided mild relief.       Patient Active Problem List   Diagnosis    Dental caries    JERMAINE (generalized anxiety disorder)    Attention deficit hyperactivity disorder    Gastroesophageal reflux disease without esophagitis    Hyperlipidemia    Obesity (BMI 35.0-39.9 without comorbidity)    Acute right-sided low back pain with right-sided sciatica    Chest pain    Pain, dental    Tobacco use    Chronic pain of right knee       Family History   Problem Relation Age of Onset    Macular degeneration Mother     Macular degeneration Maternal Grandfather     Hyperlipidemia Father     Diabetes Father     Heart attacks under age 50 Paternal Grandmother      Past Surgical History:   Procedure Laterality Date    HIP SURGERY       "as child left         Current Outpatient Medications:     escitalopram oxalate (LEXAPRO) 20 MG tablet, TAKE ONE TABLET BY MOUTH EVERY DAY, Disp: , Rfl:     fenofibrate (TRICOR) 54 MG tablet, Take 1 tablet (54 mg total) by mouth once daily., Disp: 90 tablet, Rfl: 3    hydrOXYzine pamoate (VISTARIL) 25 MG Cap, Take 25 mg by mouth as needed., Disp: , Rfl:     meloxicam (MOBIC) 7.5 MG tablet, Take 1 tablet (7.5 mg total) by mouth 2 (two) times daily., Disp: 60 tablet, Rfl: 2    traZODone (DESYREL) 100 MG tablet, Take 100 mg by mouth nightly., Disp: , Rfl: 2    Review of Systems   Constitutional: Negative for chills, fatigue and fever.   Respiratory: Negative for cough and shortness of breath.    Cardiovascular: Negative for chest pain, palpitations and leg swelling.   Gastrointestinal: Negative for abdominal pain, nausea and vomiting.   Musculoskeletal: Positive for arthralgias (R knee). Negative for myalgias.   Neurological: Negative for dizziness, tingling, facial asymmetry, light-headedness, numbness and headaches.       Objective:   /60 (BP Location: Left arm, Patient Position: Sitting, BP Method: Large (Automatic))   Pulse 91   Temp 97.4 °F (36.3 °C)   Resp 17   Ht 5' 7" (1.702 m)   Wt 100.9 kg (222 lb 7.1 oz)   SpO2 (!) 91%   BMI 34.84 kg/m²      Physical Exam   Constitutional: He is oriented to person, place, and time. He appears well-developed and well-nourished. No distress.   HENT:   Head: Normocephalic and atraumatic.   Mouth/Throat: No oropharyngeal exudate.   Cardiovascular: Normal rate, regular rhythm, normal heart sounds and intact distal pulses. Exam reveals no gallop and no friction rub.   No murmur heard.  Pulmonary/Chest: Effort normal and breath sounds normal. No respiratory distress. He has no wheezes.   Musculoskeletal: Normal range of motion. He exhibits no edema, tenderness or deformity.        Right knee: He exhibits normal range of motion, no swelling, no effusion, no " ecchymosis and no erythema. No tenderness found.   Neurological: He is alert and oriented to person, place, and time. No cranial nerve deficit.   Skin: Skin is warm and dry. He is not diaphoretic. No erythema.       Assessment & Plan     Problem List Items Addressed This Visit        Orthopedic    Chronic pain of right knee - Primary    Current Assessment & Plan     Most likely secondary to arthritis in knee.  Continue Mobic as needed for pain, also recommended daily exercise when symptoms improve.         Relevant Medications    meloxicam (MOBIC) 7.5 MG tablet       Other    Tobacco use    Current Assessment & Plan     Referred to smoke cessation program. Has decreased from 2PPD to 1PPD. Counseled on importance of smoke cessation in order to reduce risk of comoridities and improve overall quality of life.          Relevant Orders    Ambulatory referral/consult to Smoking Cessation Program           Follow up if symptoms worsen or fail to improve.

## 2020-03-03 NOTE — ASSESSMENT & PLAN NOTE
Most likely secondary to arthritis in knee.  Continue Mobic as needed for pain, also recommended daily exercise when symptoms improve.

## 2020-03-03 NOTE — ASSESSMENT & PLAN NOTE
Referred to smoke cessation program. Has decreased from 2PPD to 1PPD. Counseled on importance of smoke cessation in order to reduce risk of comoridities and improve overall quality of life.

## 2021-02-12 ENCOUNTER — TELEPHONE (OUTPATIENT)
Dept: INTERNAL MEDICINE | Facility: CLINIC | Age: 41
End: 2021-02-12

## 2021-02-13 ENCOUNTER — HOSPITAL ENCOUNTER (EMERGENCY)
Facility: HOSPITAL | Age: 41
Discharge: HOME | End: 2021-02-13
Attending: EMERGENCY MEDICINE
Payer: MEDICARE

## 2021-02-13 VITALS
WEIGHT: 214.5 LBS | RESPIRATION RATE: 20 BRPM | BODY MASS INDEX: 33.67 KG/M2 | HEART RATE: 97 BPM | OXYGEN SATURATION: 95 % | DIASTOLIC BLOOD PRESSURE: 78 MMHG | HEIGHT: 67 IN | SYSTOLIC BLOOD PRESSURE: 112 MMHG | TEMPERATURE: 99 F

## 2021-02-13 DIAGNOSIS — R07.9 CHEST PAIN: ICD-10-CM

## 2021-02-13 DIAGNOSIS — F41.9 ANXIETY: Primary | ICD-10-CM

## 2021-02-13 DIAGNOSIS — M54.9 ACUTE BACK PAIN: ICD-10-CM

## 2021-02-13 LAB
ALBUMIN SERPL BCP-MCNC: 4.1 G/DL (ref 3.5–5.2)
ALP SERPL-CCNC: 88 U/L (ref 55–135)
ALT SERPL W/O P-5'-P-CCNC: 23 U/L (ref 10–44)
ANION GAP SERPL CALC-SCNC: 14 MMOL/L (ref 8–16)
AST SERPL-CCNC: 25 U/L (ref 10–40)
BASOPHILS # BLD AUTO: 0.08 K/UL (ref 0–0.2)
BASOPHILS NFR BLD: 0.8 % (ref 0–1.9)
BILIRUB SERPL-MCNC: 0.5 MG/DL (ref 0.1–1)
BUN SERPL-MCNC: 14 MG/DL (ref 6–20)
CALCIUM SERPL-MCNC: 9.1 MG/DL (ref 8.7–10.5)
CHLORIDE SERPL-SCNC: 105 MMOL/L (ref 95–110)
CO2 SERPL-SCNC: 21 MMOL/L (ref 23–29)
CREAT SERPL-MCNC: 0.8 MG/DL (ref 0.5–1.4)
D DIMER PPP IA.FEU-MCNC: 0.22 MG/L FEU
DIFFERENTIAL METHOD: NORMAL
EOSINOPHIL # BLD AUTO: 0.3 K/UL (ref 0–0.5)
EOSINOPHIL NFR BLD: 2.9 % (ref 0–8)
ERYTHROCYTE [DISTWIDTH] IN BLOOD BY AUTOMATED COUNT: 13.2 % (ref 11.5–14.5)
EST. GFR  (AFRICAN AMERICAN): >60 ML/MIN/1.73 M^2
EST. GFR  (NON AFRICAN AMERICAN): >60 ML/MIN/1.73 M^2
GLUCOSE SERPL-MCNC: 138 MG/DL (ref 70–110)
HCT VFR BLD AUTO: 49.9 % (ref 40–54)
HCV AB SERPL QL IA: NEGATIVE
HGB BLD-MCNC: 17.4 G/DL (ref 14–18)
HIV 1+2 AB+HIV1 P24 AG SERPL QL IA: NEGATIVE
IMM GRANULOCYTES # BLD AUTO: 0.02 K/UL (ref 0–0.04)
IMM GRANULOCYTES NFR BLD AUTO: 0.2 % (ref 0–0.5)
LYMPHOCYTES # BLD AUTO: 3.5 K/UL (ref 1–4.8)
LYMPHOCYTES NFR BLD: 36.4 % (ref 18–48)
MCH RBC QN AUTO: 30.6 PG (ref 27–31)
MCHC RBC AUTO-ENTMCNC: 34.9 G/DL (ref 32–36)
MCV RBC AUTO: 88 FL (ref 82–98)
MONOCYTES # BLD AUTO: 0.8 K/UL (ref 0.3–1)
MONOCYTES NFR BLD: 8.1 % (ref 4–15)
NEUTROPHILS # BLD AUTO: 4.9 K/UL (ref 1.8–7.7)
NEUTROPHILS NFR BLD: 51.6 % (ref 38–73)
NRBC BLD-RTO: 0 /100 WBC
PLATELET # BLD AUTO: 304 K/UL (ref 150–350)
PMV BLD AUTO: 9.4 FL (ref 9.2–12.9)
POTASSIUM SERPL-SCNC: 3.9 MMOL/L (ref 3.5–5.1)
PROT SERPL-MCNC: 7.8 G/DL (ref 6–8.4)
RBC # BLD AUTO: 5.68 M/UL (ref 4.6–6.2)
SODIUM SERPL-SCNC: 140 MMOL/L (ref 136–145)
TROPONIN I SERPL DL<=0.01 NG/ML-MCNC: 0.01 NG/ML (ref 0–0.03)
WBC # BLD AUTO: 9.53 K/UL (ref 3.9–12.7)

## 2021-02-13 PROCEDURE — 93010 ELECTROCARDIOGRAM REPORT: CPT | Mod: ,,, | Performed by: INTERNAL MEDICINE

## 2021-02-13 PROCEDURE — 80053 COMPREHEN METABOLIC PANEL: CPT | Mod: ER

## 2021-02-13 PROCEDURE — 86803 HEPATITIS C AB TEST: CPT

## 2021-02-13 PROCEDURE — 85025 COMPLETE CBC W/AUTO DIFF WBC: CPT | Mod: ER

## 2021-02-13 PROCEDURE — 85379 FIBRIN DEGRADATION QUANT: CPT | Mod: ER

## 2021-02-13 PROCEDURE — 84484 ASSAY OF TROPONIN QUANT: CPT | Mod: ER

## 2021-02-13 PROCEDURE — 99285 EMERGENCY DEPT VISIT HI MDM: CPT | Mod: 25,ER

## 2021-02-13 PROCEDURE — 93005 ELECTROCARDIOGRAM TRACING: CPT | Mod: ER

## 2021-02-13 PROCEDURE — 93010 EKG 12-LEAD: ICD-10-PCS | Mod: ,,, | Performed by: INTERNAL MEDICINE

## 2021-02-13 PROCEDURE — 86703 HIV-1/HIV-2 1 RESULT ANTBDY: CPT

## 2021-02-13 RX ORDER — ESCITALOPRAM OXALATE 20 MG/1
20 TABLET ORAL DAILY
Qty: 30 TABLET | Refills: 0 | Status: SHIPPED | OUTPATIENT
Start: 2021-02-13 | End: 2021-03-17 | Stop reason: SDUPTHER

## 2021-03-17 RX ORDER — ESCITALOPRAM OXALATE 20 MG/1
20 TABLET ORAL DAILY
Qty: 90 TABLET | Refills: 3 | Status: SHIPPED | OUTPATIENT
Start: 2021-03-17 | End: 2022-02-01

## 2021-08-02 RX ORDER — FENOFIBRATE 54 MG/1
TABLET ORAL
Qty: 30 TABLET | Refills: 0 | Status: SHIPPED | OUTPATIENT
Start: 2021-08-02 | End: 2021-09-09

## 2021-11-05 RX ORDER — FENOFIBRATE 54 MG/1
TABLET ORAL
Qty: 30 TABLET | Refills: 0 | Status: SHIPPED | OUTPATIENT
Start: 2021-11-05 | End: 2022-03-07 | Stop reason: SDUPTHER

## 2022-01-31 NOTE — TELEPHONE ENCOUNTER
Care Due:                  Date            Visit Type   Department     Provider  --------------------------------------------------------------------------------    Last Visit: None Found      None         None Found  Next Visit: None Scheduled  None         None Found                                                            Last  Test          Frequency    Reason                     Performed    Due Date  --------------------------------------------------------------------------------    Office Visit  12 months..  EScitalopram.............  Not Found    Overdue    Powered by [employer]. Reference number: [id number].   1/31/2022 5:13:57 PM CST

## 2022-02-01 RX ORDER — ESCITALOPRAM OXALATE 20 MG/1
TABLET ORAL
Qty: 90 TABLET | Refills: 3 | Status: SHIPPED | OUTPATIENT
Start: 2022-02-01 | End: 2022-03-14 | Stop reason: SDUPTHER

## 2022-03-07 NOTE — TELEPHONE ENCOUNTER
----- Message from Isabellaashley Monzon sent at 3/7/2022  4:55 PM CST -----  Pt would like a refill for his cholesterol medicine. He is blind and can't drive and have to wait until someone bring him to the doctor for an appt. Call back number is [phone]. Thx. El

## 2022-03-07 NOTE — TELEPHONE ENCOUNTER
No new care gaps identified.  Powered by [employer]. Reference number: [id number].   3/07/2022 5:03:14 PM CST

## 2022-03-08 RX ORDER — FENOFIBRATE 54 MG/1
54 TABLET ORAL DAILY
Qty: 90 TABLET | Refills: 3 | Status: SHIPPED | OUTPATIENT
Start: 2022-03-08 | End: 2022-03-14 | Stop reason: SDUPTHER

## 2022-03-14 ENCOUNTER — OFFICE VISIT (OUTPATIENT)
Dept: INTERNAL MEDICINE | Facility: CLINIC | Age: 42
End: 2022-03-14
Payer: MEDICARE

## 2022-03-14 ENCOUNTER — LAB VISIT (OUTPATIENT)
Dept: LAB | Facility: HOSPITAL | Age: 42
End: 2022-03-14
Attending: FAMILY MEDICINE
Payer: MEDICARE

## 2022-03-14 VITALS
DIASTOLIC BLOOD PRESSURE: 78 MMHG | WEIGHT: 225.31 LBS | OXYGEN SATURATION: 96 % | HEART RATE: 108 BPM | SYSTOLIC BLOOD PRESSURE: 108 MMHG | TEMPERATURE: 99 F | HEIGHT: 67 IN | RESPIRATION RATE: 19 BRPM | BODY MASS INDEX: 35.36 KG/M2

## 2022-03-14 DIAGNOSIS — Z00.00 ROUTINE HEALTH MAINTENANCE: Primary | ICD-10-CM

## 2022-03-14 DIAGNOSIS — K21.9 GASTROESOPHAGEAL REFLUX DISEASE WITHOUT ESOPHAGITIS: ICD-10-CM

## 2022-03-14 DIAGNOSIS — Z00.00 ROUTINE HEALTH MAINTENANCE: ICD-10-CM

## 2022-03-14 DIAGNOSIS — F41.1 GAD (GENERALIZED ANXIETY DISORDER): ICD-10-CM

## 2022-03-14 DIAGNOSIS — E78.5 HYPERLIPIDEMIA, UNSPECIFIED HYPERLIPIDEMIA TYPE: ICD-10-CM

## 2022-03-14 PROBLEM — R07.9 CHEST PAIN: Status: RESOLVED | Noted: 2020-01-10 | Resolved: 2022-03-14

## 2022-03-14 LAB
ALBUMIN SERPL BCP-MCNC: 4.1 G/DL (ref 3.5–5.2)
ALP SERPL-CCNC: 78 U/L (ref 55–135)
ALT SERPL W/O P-5'-P-CCNC: 42 U/L (ref 10–44)
ANION GAP SERPL CALC-SCNC: 9 MMOL/L (ref 8–16)
AST SERPL-CCNC: 26 U/L (ref 10–40)
BILIRUB SERPL-MCNC: 0.4 MG/DL (ref 0.1–1)
BUN SERPL-MCNC: 14 MG/DL (ref 6–20)
CALCIUM SERPL-MCNC: 9.5 MG/DL (ref 8.7–10.5)
CHLORIDE SERPL-SCNC: 105 MMOL/L (ref 95–110)
CO2 SERPL-SCNC: 27 MMOL/L (ref 23–29)
CREAT SERPL-MCNC: 1 MG/DL (ref 0.5–1.4)
EST. GFR  (AFRICAN AMERICAN): >60 ML/MIN/1.73 M^2
EST. GFR  (NON AFRICAN AMERICAN): >60 ML/MIN/1.73 M^2
GLUCOSE SERPL-MCNC: 117 MG/DL (ref 70–110)
POTASSIUM SERPL-SCNC: 4.6 MMOL/L (ref 3.5–5.1)
PROT SERPL-MCNC: 7.7 G/DL (ref 6–8.4)
SODIUM SERPL-SCNC: 141 MMOL/L (ref 136–145)

## 2022-03-14 PROCEDURE — 99214 PR OFFICE/OUTPT VISIT, EST, LEVL IV, 30-39 MIN: ICD-10-PCS | Mod: S$PBB,,, | Performed by: FAMILY MEDICINE

## 2022-03-14 PROCEDURE — 80061 LIPID PANEL: CPT | Performed by: FAMILY MEDICINE

## 2022-03-14 PROCEDURE — 80053 COMPREHEN METABOLIC PANEL: CPT | Mod: PO | Performed by: FAMILY MEDICINE

## 2022-03-14 PROCEDURE — 99214 OFFICE O/P EST MOD 30 MIN: CPT | Mod: S$PBB,,, | Performed by: FAMILY MEDICINE

## 2022-03-14 PROCEDURE — 36415 COLL VENOUS BLD VENIPUNCTURE: CPT | Mod: PO | Performed by: FAMILY MEDICINE

## 2022-03-14 PROCEDURE — 99213 OFFICE O/P EST LOW 20 MIN: CPT | Mod: PBBFAC,PO | Performed by: FAMILY MEDICINE

## 2022-03-14 PROCEDURE — 99999 PR PBB SHADOW E&M-EST. PATIENT-LVL III: CPT | Mod: PBBFAC,,, | Performed by: FAMILY MEDICINE

## 2022-03-14 PROCEDURE — 99999 PR PBB SHADOW E&M-EST. PATIENT-LVL III: ICD-10-PCS | Mod: PBBFAC,,, | Performed by: FAMILY MEDICINE

## 2022-03-14 RX ORDER — PANTOPRAZOLE SODIUM 40 MG/1
40 TABLET, DELAYED RELEASE ORAL DAILY
Qty: 30 TABLET | Refills: 2 | Status: SHIPPED | OUTPATIENT
Start: 2022-03-14 | End: 2023-07-19 | Stop reason: ALTCHOICE

## 2022-03-14 RX ORDER — FENOFIBRATE 54 MG/1
54 TABLET ORAL DAILY
Qty: 90 TABLET | Refills: 3 | Status: SHIPPED | OUTPATIENT
Start: 2022-03-14 | End: 2023-05-12

## 2022-03-14 RX ORDER — MELOXICAM 7.5 MG/1
7.5 TABLET ORAL DAILY PRN
Qty: 30 TABLET | Refills: 2 | Status: SHIPPED | OUTPATIENT
Start: 2022-03-14 | End: 2022-04-02

## 2022-03-14 RX ORDER — MELOXICAM 7.5 MG/1
7.5 TABLET ORAL DAILY PRN
COMMUNITY
Start: 2021-11-05 | End: 2022-03-14 | Stop reason: SDUPTHER

## 2022-03-14 RX ORDER — TRAZODONE HYDROCHLORIDE 100 MG/1
100 TABLET ORAL NIGHTLY
Qty: 90 TABLET | Refills: 2 | Status: SHIPPED | OUTPATIENT
Start: 2022-03-14 | End: 2023-02-14

## 2022-03-14 RX ORDER — ESCITALOPRAM OXALATE 20 MG/1
20 TABLET ORAL DAILY
Qty: 90 TABLET | Refills: 3 | Status: SHIPPED | OUTPATIENT
Start: 2022-03-14 | End: 2023-05-20

## 2022-03-14 NOTE — PROGRESS NOTES
Subjective:       Patient ID: Philipp Gonzales is a 41 y.o. male.    Chief Complaint: Annual Exam    HPI    Has been having b/l shoulder pain/tightness and he reports that this is pretty much constant over the last couple of months.  Occasionally will get a tightnes in middle of chest that makes it hard to breathe. Says that he feels like it could be indigestions as he has had that issue in the past and it did resolve with medication. Never any nausea or diaphoresis.  Not associated with exercise.  Has similar issues like this in 2020 and was seen by Cardiology.  Had EKG is with possible LVH and echo was reportedly done however I cannot see the result this.    Does feel more stressed over last year. Feels like it is harder to sleep.  Has been consistently on Lexapro but has been out of the trazodone recently.        Family History   Problem Relation Age of Onset    Macular degeneration Mother     Macular degeneration Maternal Grandfather     Hyperlipidemia Father     Diabetes Father     Heart attacks under age 50 Paternal Grandmother        Current Outpatient Medications:     EScitalopram oxalate (LEXAPRO) 20 MG tablet, Take 1 tablet (20 mg total) by mouth once daily., Disp: 90 tablet, Rfl: 3    fenofibrate (TRICOR) 54 MG tablet, Take 1 tablet (54 mg total) by mouth once daily., Disp: 90 tablet, Rfl: 3    meloxicam (MOBIC) 7.5 MG tablet, Take 1 tablet (7.5 mg total) by mouth daily as needed for Pain., Disp: 30 tablet, Rfl: 2    pantoprazole (PROTONIX) 40 MG tablet, Take 1 tablet (40 mg total) by mouth once daily., Disp: 30 tablet, Rfl: 2    traZODone (DESYREL) 100 MG tablet, Take 1 tablet (100 mg total) by mouth nightly., Disp: 90 tablet, Rfl: 2    Review of Systems   Constitutional: Negative for chills and fever.   Eyes: Negative for visual disturbance.   Respiratory: Negative for cough and shortness of breath.    Cardiovascular: Negative for chest pain.   Gastrointestinal: Negative for abdominal pain.  "  Musculoskeletal: Positive for arthralgias.   Neurological: Positive for headaches (more frequently). Negative for dizziness.       Objective:   /78 (BP Location: Left arm, Patient Position: Sitting, BP Method: Large (Manual))   Pulse 108   Temp 98.6 °F (37 °C) (Temporal)   Resp 19   Ht 5' 7" (1.702 m)   Wt 102.2 kg (225 lb 5 oz)   SpO2 96%   BMI 35.29 kg/m²      Physical Exam  Vitals reviewed.   Constitutional:       General: He is not in acute distress.     Appearance: He is well-developed. He is not diaphoretic.   HENT:      Head: Normocephalic and atraumatic.      Nose: Nose normal.   Eyes:      General:         Right eye: No discharge.         Left eye: No discharge.      Conjunctiva/sclera: Conjunctivae normal.      Pupils: Pupils are equal, round, and reactive to light.   Neck:      Thyroid: No thyromegaly.   Cardiovascular:      Rate and Rhythm: Normal rate and regular rhythm.      Heart sounds: Normal heart sounds. No murmur heard.  Pulmonary:      Effort: Pulmonary effort is normal. No respiratory distress.      Breath sounds: Normal breath sounds. No wheezing.   Abdominal:      General: There is no distension.      Palpations: Abdomen is soft.   Musculoskeletal:      Comments: Increased muscle tonicity of bilateral trapezius muscles.   Skin:     General: Skin is warm.      Findings: No rash.   Neurological:      Mental Status: He is alert and oriented to person, place, and time.   Psychiatric:         Behavior: Behavior normal.         Assessment & Plan     Problem List Items Addressed This Visit        Psychiatric    JERMAINE (generalized anxiety disorder)    Current Assessment & Plan     Likely having the bilateral shoulder tightness secondary to anxiety           Relevant Medications    traZODone (DESYREL) 100 MG tablet    EScitalopram oxalate (LEXAPRO) 20 MG tablet       Cardiac/Vascular    Hyperlipidemia    Relevant Medications    fenofibrate (TRICOR) 54 MG tablet    Other Relevant Orders    " Lipid Panel       GI    Gastroesophageal reflux disease without esophagitis    Current Assessment & Plan     Trial of PPI. If continues to have symptoms or something changes to character of epigastric discomfort, I advised him to let us know           Relevant Medications    pantoprazole (PROTONIX) 40 MG tablet       Other    Routine health maintenance - Primary    Relevant Orders    Lipid Panel    Comprehensive Metabolic Panel            Immunizations Administered on Date of Encounter - 3/14/2022     No immunizations on file.           No follow-ups on file.    Disclaimer:  This note may have been prepared using voice recognition software, it may have not been extensively proofed, as such there could be errors within the text such as sound alike errors.

## 2022-03-14 NOTE — ASSESSMENT & PLAN NOTE
Trial of PPI. If continues to have symptoms or something changes to character of epigastric discomfort, I advised him to let us know

## 2022-03-14 NOTE — ASSESSMENT & PLAN NOTE
Draining removed by Dr. Jose Bliar. Small amount of bloody drainage noted to aramis. New 4x4 gauze applied with a bandaid. Firmness noted around incision site. Likely having the bilateral shoulder tightness secondary to anxiety

## 2022-03-15 ENCOUNTER — TELEPHONE (OUTPATIENT)
Dept: INTERNAL MEDICINE | Facility: CLINIC | Age: 42
End: 2022-03-15
Payer: MEDICARE

## 2022-03-15 LAB
CHOLEST SERPL-MCNC: 226 MG/DL (ref 120–199)
CHOLEST/HDLC SERPL: 8.4 {RATIO} (ref 2–5)
HDLC SERPL-MCNC: 27 MG/DL (ref 40–75)
HDLC SERPL: 11.9 % (ref 20–50)
LDLC SERPL CALC-MCNC: ABNORMAL MG/DL (ref 63–159)
NONHDLC SERPL-MCNC: 199 MG/DL
TRIGL SERPL-MCNC: 548 MG/DL (ref 30–150)

## 2022-03-15 NOTE — TELEPHONE ENCOUNTER
----- Message from Tasha Moffett sent at 3/15/2022  3:13 PM CDT -----  Contact: Patient, [phone]  Patient is returning a phone call.  Who left a message for the patient: Cassi  Does patient know what this is regarding:  Lab results  Would you like a call back, or a response through your [facility] portal?:   Call back  Comments:  Missed your call, please call him back. Thanks.

## 2022-03-15 NOTE — TELEPHONE ENCOUNTER
----- Message from Adrian Lacy DO sent at 3/15/2022  8:06 AM CDT -----  Triglycerides elevated. Should improve with resuming fenofibrate

## 2022-03-31 ENCOUNTER — TELEPHONE (OUTPATIENT)
Dept: FAMILY MEDICINE | Facility: CLINIC | Age: 42
End: 2022-03-31
Payer: MEDICARE

## 2022-03-31 RX ORDER — AMOXICILLIN AND CLAVULANATE POTASSIUM 875; 125 MG/1; MG/1
1 TABLET, FILM COATED ORAL EVERY 12 HOURS
Qty: 14 TABLET | Refills: 0 | Status: SHIPPED | OUTPATIENT
Start: 2022-03-31 | End: 2022-04-07

## 2022-03-31 NOTE — TELEPHONE ENCOUNTER
Pt states he has dental isusrs and he does not have the money to do procedure.Pt states that his mouth tooth and ear are hurting and he wants some abx to help relieve pain until he can get procedure done please advise

## 2022-03-31 NOTE — TELEPHONE ENCOUNTER
Will sent augmentin. If he needs anything additional will need to follow up in person in [facility].

## 2022-03-31 NOTE — TELEPHONE ENCOUNTER
Called pt and informed medication was sent in, and to follow up in person in [place] if he needs anything else.

## 2022-03-31 NOTE — TELEPHONE ENCOUNTER
----- Message from Heron Stack sent at 3/31/2022  2:36 PM CDT -----  Contact: self  Pt would like to consult with nurse regarding a Rx dorsey Abx.  Please contact Philipp Gonzales @ [phone].  Thanks/As

## 2022-04-02 ENCOUNTER — HOSPITAL ENCOUNTER (INPATIENT)
Facility: HOSPITAL | Age: 42
LOS: 2 days | Discharge: HOME | DRG: 247 | End: 2022-04-04
Attending: EMERGENCY MEDICINE | Admitting: INTERNAL MEDICINE
Payer: MEDICARE

## 2022-04-02 DIAGNOSIS — I24.9 ACS (ACUTE CORONARY SYNDROME): Primary | ICD-10-CM

## 2022-04-02 DIAGNOSIS — I21.4 NSTEMI (NON-ST ELEVATED MYOCARDIAL INFARCTION): ICD-10-CM

## 2022-04-02 DIAGNOSIS — R07.9 CHEST PAIN: ICD-10-CM

## 2022-04-02 PROBLEM — Z72.0 TOBACCO USE: Chronic | Status: ACTIVE | Noted: 2020-03-03

## 2022-04-02 PROBLEM — E78.5 HYPERLIPIDEMIA: Chronic | Status: ACTIVE | Noted: 2017-05-26

## 2022-04-02 LAB
ALBUMIN SERPL BCP-MCNC: 3.7 G/DL (ref 3.5–5.2)
ALP SERPL-CCNC: 60 U/L (ref 55–135)
ALT SERPL W/O P-5'-P-CCNC: 30 U/L (ref 10–44)
ANION GAP SERPL CALC-SCNC: 11 MMOL/L (ref 8–16)
APTT BLDCRRT: 29.6 SEC (ref 21–32)
AST SERPL-CCNC: 36 U/L (ref 10–40)
BASOPHILS # BLD AUTO: 0.08 K/UL (ref 0–0.2)
BASOPHILS NFR BLD: 0.8 % (ref 0–1.9)
BILIRUB SERPL-MCNC: 0.4 MG/DL (ref 0.1–1)
BNP SERPL-MCNC: 94 PG/ML (ref 0–99)
BUN SERPL-MCNC: 17 MG/DL (ref 6–20)
CALCIUM SERPL-MCNC: 9.1 MG/DL (ref 8.7–10.5)
CHLORIDE SERPL-SCNC: 108 MMOL/L (ref 95–110)
CO2 SERPL-SCNC: 21 MMOL/L (ref 23–29)
CREAT SERPL-MCNC: 0.8 MG/DL (ref 0.5–1.4)
CTP QC/QA: YES
DIFFERENTIAL METHOD: NORMAL
EOSINOPHIL # BLD AUTO: 0.3 K/UL (ref 0–0.5)
EOSINOPHIL NFR BLD: 2.8 % (ref 0–8)
ERYTHROCYTE [DISTWIDTH] IN BLOOD BY AUTOMATED COUNT: 13.4 % (ref 11.5–14.5)
EST. GFR  (AFRICAN AMERICAN): >60 ML/MIN/1.73 M^2
EST. GFR  (NON AFRICAN AMERICAN): >60 ML/MIN/1.73 M^2
GLUCOSE SERPL-MCNC: 99 MG/DL (ref 70–110)
HCT VFR BLD AUTO: 47.8 % (ref 40–54)
HGB BLD-MCNC: 16.4 G/DL (ref 14–18)
IMM GRANULOCYTES # BLD AUTO: 0.03 K/UL (ref 0–0.04)
IMM GRANULOCYTES NFR BLD AUTO: 0.3 % (ref 0–0.5)
INR PPP: 1.1 (ref 0.8–1.2)
LYMPHOCYTES # BLD AUTO: 3.7 K/UL (ref 1–4.8)
LYMPHOCYTES NFR BLD: 38.8 % (ref 18–48)
MCH RBC QN AUTO: 30.7 PG (ref 27–31)
MCHC RBC AUTO-ENTMCNC: 34.3 G/DL (ref 32–36)
MCV RBC AUTO: 89 FL (ref 82–98)
MONOCYTES # BLD AUTO: 0.8 K/UL (ref 0.3–1)
MONOCYTES NFR BLD: 8 % (ref 4–15)
NEUTROPHILS # BLD AUTO: 4.7 K/UL (ref 1.8–7.7)
NEUTROPHILS NFR BLD: 49.3 % (ref 38–73)
NRBC BLD-RTO: 0 /100 WBC
PLATELET # BLD AUTO: 282 K/UL (ref 150–450)
PMV BLD AUTO: 9.7 FL (ref 9.2–12.9)
POTASSIUM SERPL-SCNC: 4.2 MMOL/L (ref 3.5–5.1)
PROT SERPL-MCNC: 7.2 G/DL (ref 6–8.4)
PROTHROMBIN TIME: 11.6 SEC (ref 9–12.5)
RBC # BLD AUTO: 5.35 M/UL (ref 4.6–6.2)
SARS-COV-2 RDRP RESP QL NAA+PROBE: NEGATIVE
SODIUM SERPL-SCNC: 140 MMOL/L (ref 136–145)
TROPONIN I SERPL DL<=0.01 NG/ML-MCNC: 1.98 NG/ML (ref 0–0.03)
WBC # BLD AUTO: 9.52 K/UL (ref 3.9–12.7)

## 2022-04-02 PROCEDURE — 93010 ELECTROCARDIOGRAM REPORT: CPT | Mod: ,,, | Performed by: INTERNAL MEDICINE

## 2022-04-02 PROCEDURE — 85025 COMPLETE CBC W/AUTO DIFF WBC: CPT | Mod: ER | Performed by: EMERGENCY MEDICINE

## 2022-04-02 PROCEDURE — 80053 COMPREHEN METABOLIC PANEL: CPT | Mod: ER | Performed by: EMERGENCY MEDICINE

## 2022-04-02 PROCEDURE — 85730 THROMBOPLASTIN TIME PARTIAL: CPT | Mod: ER | Performed by: EMERGENCY MEDICINE

## 2022-04-02 PROCEDURE — 93005 ELECTROCARDIOGRAM TRACING: CPT | Mod: ER

## 2022-04-02 PROCEDURE — 63600175 PHARM REV CODE 636 W HCPCS: Mod: ER | Performed by: EMERGENCY MEDICINE

## 2022-04-02 PROCEDURE — 96374 THER/PROPH/DIAG INJ IV PUSH: CPT | Mod: ER

## 2022-04-02 PROCEDURE — 25000003 PHARM REV CODE 250: Mod: ER | Performed by: NURSE PRACTITIONER

## 2022-04-02 PROCEDURE — 99291 CRITICAL CARE FIRST HOUR: CPT | Mod: 25,ER

## 2022-04-02 PROCEDURE — 85610 PROTHROMBIN TIME: CPT | Mod: ER | Performed by: EMERGENCY MEDICINE

## 2022-04-02 PROCEDURE — 25000003 PHARM REV CODE 250: Mod: ER | Performed by: EMERGENCY MEDICINE

## 2022-04-02 PROCEDURE — 96375 TX/PRO/DX INJ NEW DRUG ADDON: CPT | Mod: ER

## 2022-04-02 PROCEDURE — 21400001 HC TELEMETRY ROOM

## 2022-04-02 PROCEDURE — 93010 EKG 12-LEAD: ICD-10-PCS | Mod: ,,, | Performed by: INTERNAL MEDICINE

## 2022-04-02 PROCEDURE — 84484 ASSAY OF TROPONIN QUANT: CPT | Mod: ER | Performed by: EMERGENCY MEDICINE

## 2022-04-02 PROCEDURE — U0002 COVID-19 LAB TEST NON-CDC: HCPCS | Mod: ER | Performed by: EMERGENCY MEDICINE

## 2022-04-02 PROCEDURE — 83880 ASSAY OF NATRIURETIC PEPTIDE: CPT | Mod: ER | Performed by: EMERGENCY MEDICINE

## 2022-04-02 RX ORDER — ESCITALOPRAM OXALATE 10 MG/1
20 TABLET ORAL DAILY
Status: DISCONTINUED | OUTPATIENT
Start: 2022-04-03 | End: 2022-04-04 | Stop reason: HOSPADM

## 2022-04-02 RX ORDER — METOPROLOL TARTRATE 1 MG/ML
5 INJECTION, SOLUTION INTRAVENOUS
Status: COMPLETED | OUTPATIENT
Start: 2022-04-02 | End: 2022-04-02

## 2022-04-02 RX ORDER — ONDANSETRON 2 MG/ML
4 INJECTION INTRAMUSCULAR; INTRAVENOUS
Status: COMPLETED | OUTPATIENT
Start: 2022-04-02 | End: 2022-04-02

## 2022-04-02 RX ORDER — SODIUM CHLORIDE 0.9 % (FLUSH) 0.9 %
10 SYRINGE (ML) INJECTION
Status: DISCONTINUED | OUTPATIENT
Start: 2022-04-02 | End: 2022-04-04 | Stop reason: HOSPADM

## 2022-04-02 RX ORDER — MORPHINE SULFATE 4 MG/ML
2 INJECTION, SOLUTION INTRAMUSCULAR; INTRAVENOUS
Status: COMPLETED | OUTPATIENT
Start: 2022-04-02 | End: 2022-04-02

## 2022-04-02 RX ORDER — IBUPROFEN 200 MG
1 TABLET ORAL DAILY
Status: DISCONTINUED | OUTPATIENT
Start: 2022-04-03 | End: 2022-04-04 | Stop reason: HOSPADM

## 2022-04-02 RX ORDER — SODIUM CHLORIDE 0.9 % (FLUSH) 0.9 %
10 SYRINGE (ML) INJECTION EVERY 12 HOURS PRN
Status: DISCONTINUED | OUTPATIENT
Start: 2022-04-02 | End: 2022-04-04 | Stop reason: HOSPADM

## 2022-04-02 RX ORDER — ONDANSETRON 8 MG/1
8 TABLET, ORALLY DISINTEGRATING ORAL EVERY 8 HOURS PRN
Status: DISCONTINUED | OUTPATIENT
Start: 2022-04-02 | End: 2022-04-04 | Stop reason: HOSPADM

## 2022-04-02 RX ORDER — TRAZODONE HYDROCHLORIDE 100 MG/1
100 TABLET ORAL NIGHTLY PRN
Status: DISCONTINUED | OUTPATIENT
Start: 2022-04-02 | End: 2022-04-04 | Stop reason: HOSPADM

## 2022-04-02 RX ORDER — PANTOPRAZOLE SODIUM 40 MG/1
40 TABLET, DELAYED RELEASE ORAL DAILY
Status: DISCONTINUED | OUTPATIENT
Start: 2022-04-03 | End: 2022-04-04 | Stop reason: HOSPADM

## 2022-04-02 RX ORDER — BISACODYL 10 MG
10 SUPPOSITORY, RECTAL RECTAL DAILY PRN
Status: DISCONTINUED | OUTPATIENT
Start: 2022-04-02 | End: 2022-04-04 | Stop reason: HOSPADM

## 2022-04-02 RX ORDER — CLOPIDOGREL 300 MG/1
300 TABLET, FILM COATED ORAL
Status: CANCELLED | OUTPATIENT
Start: 2022-04-02 | End: 2022-04-02

## 2022-04-02 RX ORDER — ASPIRIN 81 MG/1
81 TABLET ORAL DAILY
Status: DISCONTINUED | OUTPATIENT
Start: 2022-04-03 | End: 2022-04-04 | Stop reason: HOSPADM

## 2022-04-02 RX ORDER — ACETAMINOPHEN 325 MG/1
650 TABLET ORAL EVERY 6 HOURS PRN
Status: DISCONTINUED | OUTPATIENT
Start: 2022-04-02 | End: 2022-04-04 | Stop reason: HOSPADM

## 2022-04-02 RX ORDER — HEPARIN SODIUM,PORCINE/D5W 25000/250
0-40 INTRAVENOUS SOLUTION INTRAVENOUS CONTINUOUS
Status: DISCONTINUED | OUTPATIENT
Start: 2022-04-02 | End: 2022-04-03

## 2022-04-02 RX ORDER — MAG HYDROX/ALUMINUM HYD/SIMETH 200-200-20
30 SUSPENSION, ORAL (FINAL DOSE FORM) ORAL 4 TIMES DAILY PRN
Status: DISCONTINUED | OUTPATIENT
Start: 2022-04-02 | End: 2022-04-04 | Stop reason: HOSPADM

## 2022-04-02 RX ORDER — ATORVASTATIN CALCIUM 40 MG/1
80 TABLET, FILM COATED ORAL
Status: COMPLETED | OUTPATIENT
Start: 2022-04-02 | End: 2022-04-02

## 2022-04-02 RX ORDER — TALC
6 POWDER (GRAM) TOPICAL NIGHTLY PRN
Status: DISCONTINUED | OUTPATIENT
Start: 2022-04-02 | End: 2022-04-04 | Stop reason: HOSPADM

## 2022-04-02 RX ORDER — METOPROLOL TARTRATE 25 MG/1
25 TABLET, FILM COATED ORAL 2 TIMES DAILY
Status: DISCONTINUED | OUTPATIENT
Start: 2022-04-02 | End: 2022-04-04 | Stop reason: HOSPADM

## 2022-04-02 RX ORDER — PROMETHAZINE HYDROCHLORIDE 25 MG/1
25 TABLET ORAL EVERY 6 HOURS PRN
Status: DISCONTINUED | OUTPATIENT
Start: 2022-04-02 | End: 2022-04-04 | Stop reason: HOSPADM

## 2022-04-02 RX ORDER — CLOPIDOGREL 300 MG/1
300 TABLET, FILM COATED ORAL ONCE
Status: COMPLETED | OUTPATIENT
Start: 2022-04-02 | End: 2022-04-02

## 2022-04-02 RX ORDER — ASPIRIN 325 MG
325 TABLET ORAL
Status: COMPLETED | OUTPATIENT
Start: 2022-04-02 | End: 2022-04-02

## 2022-04-02 RX ORDER — FENOFIBRATE 160 MG/1
160 TABLET ORAL DAILY
Refills: 3 | Status: DISCONTINUED | OUTPATIENT
Start: 2022-04-03 | End: 2022-04-04 | Stop reason: HOSPADM

## 2022-04-02 RX ORDER — NITROGLYCERIN 0.4 MG/1
0.4 TABLET SUBLINGUAL EVERY 5 MIN PRN
Status: DISCONTINUED | OUTPATIENT
Start: 2022-04-02 | End: 2022-04-04 | Stop reason: HOSPADM

## 2022-04-02 RX ADMIN — NITROGLYCERIN 1 INCH: 20 OINTMENT TOPICAL at 06:04

## 2022-04-02 RX ADMIN — CLOPIDOGREL BISULFATE 300 MG: 300 TABLET, FILM COATED ORAL at 10:04

## 2022-04-02 RX ADMIN — METOPROLOL TARTRATE 25 MG: 25 TABLET, FILM COATED ORAL at 10:04

## 2022-04-02 RX ADMIN — ONDANSETRON 4 MG: 2 INJECTION INTRAMUSCULAR; INTRAVENOUS at 07:04

## 2022-04-02 RX ADMIN — MORPHINE SULFATE 2 MG: 4 INJECTION INTRAVENOUS at 07:04

## 2022-04-02 RX ADMIN — HEPARIN SODIUM AND DEXTROSE 12 UNITS/KG/HR: 10000; 5 INJECTION INTRAVENOUS at 08:04

## 2022-04-02 RX ADMIN — ATORVASTATIN CALCIUM 80 MG: 40 TABLET, FILM COATED ORAL at 09:04

## 2022-04-02 RX ADMIN — ASPIRIN 325 MG ORAL TABLET 325 MG: 325 PILL ORAL at 06:04

## 2022-04-02 RX ADMIN — METOPROLOL TARTRATE 5 MG: 5 INJECTION INTRAVENOUS at 07:04

## 2022-04-02 NOTE — Clinical Note
Pt was seen by Dr. Chao Sanders on 4/18/17. Dr Mckeon not in clinic this week.    The catheter was removed from the ostium   right coronary artery.

## 2022-04-02 NOTE — Clinical Note
280 ml of contrast were injected throughout the case. 20 mL of contrast was the total wasted during the case. 300 mL was the total amount used during the case.

## 2022-04-02 NOTE — ED PROVIDER NOTES
Encounter Date: 4/2/2022       History     Chief Complaint   Patient presents with    Chest Pain     Mid sternal chest pain that radiates to KRISTOPHER shoulders, +Nausea, +SOB. Symptoms have been intermittent x1 month.     The history is provided by the patient.   Chest Pain  The current episode started several weeks ago. Duration of episode(s) is 10 minutes. Chest pain occurs intermittently. The chest pain is resolved. The pain is associated with exertion. At its most intense, the chest pain is at 9/10. The chest pain is currently at 0/10. The quality of the pain is described as aching. The pain radiates to the right shoulder and left shoulder. Chest pain is worsened by exertion. Primary symptoms include shortness of breath. Pertinent negatives for primary symptoms include no fever, no fatigue, no syncope, no cough, no wheezing, no palpitations, no abdominal pain, no nausea, no vomiting, no dizziness and no altered mental status.   The shortness of breath began today. The shortness of breath developed suddenly. He tried nothing for the symptoms. Risk factors include male gender, lack of exercise and smoking/tobacco exposure.   His past medical history is significant for anxiety/panic attacks, hyperlipidemia and hypertension.     Review of patient's allergies indicates:  No Known Allergies  Past Medical History:   Diagnosis Date    Anxiety     High cholesterol     Hyperlipidemia     Insomnia     Legally blind     Macular degeneration      Past Surgical History:   Procedure Laterality Date    HIP SURGERY      as child left     Family History   Problem Relation Age of Onset    Macular degeneration Mother     Macular degeneration Maternal Grandfather     Hyperlipidemia Father     Diabetes Father     Heart attacks under age 50 Paternal Grandmother      Social History     Tobacco Use    Smoking status: Current Every Day Smoker     Packs/day: 1.00     Types: Cigarettes    Smokeless tobacco: Never Used   Substance  Use Topics    Alcohol use: Yes     Comment: rarely    Drug use: No     Review of Systems   Constitutional: Negative.  Negative for fatigue and fever.   HENT: Negative.    Respiratory: Positive for shortness of breath. Negative for cough and wheezing.    Cardiovascular: Positive for chest pain. Negative for palpitations and syncope.   Gastrointestinal: Negative for abdominal pain, nausea and vomiting.   Genitourinary: Negative.    Musculoskeletal: Negative.    Skin: Negative.    Neurological: Negative for dizziness.   All other systems reviewed and are negative.      Physical Exam     Initial Vitals [04/02/22 1833]   BP Pulse Resp Temp SpO2   (!) 155/85 105 20 98.3 °F (36.8 °C) 96 %      MAP       --         Physical Exam    Nursing note and vitals reviewed.  Constitutional: He appears well-developed and well-nourished.   HENT:   Head: Normocephalic and atraumatic.   Mouth/Throat: Oropharynx is clear and moist.   Eyes: Conjunctivae and EOM are normal. Pupils are equal, round, and reactive to light.   Neck: Neck supple.   Normal range of motion.  Cardiovascular: Normal rate, regular rhythm and normal heart sounds.   Pulmonary/Chest: Breath sounds normal.   Abdominal: Abdomen is soft. Bowel sounds are normal.   Musculoskeletal:         General: Normal range of motion.      Cervical back: Normal range of motion and neck supple.     Neurological: He is alert and oriented to person, place, and time. He has normal strength.   Skin: Skin is warm and dry.   Psychiatric: He has a normal mood and affect. Thought content normal.         ED Course   Critical Care    Date/Time: 4/2/2022 8:06 PM  Performed by: Emerson Murdock MD  Authorized by: Emerson Murdock MD   Total critical care time (exclusive of procedural time) : 47 minutes  Critical care time was exclusive of separately billable procedures and treating other patients.  Critical care was necessary to treat or prevent imminent or life-threatening  deterioration of the following conditions: cardiac failure.  Critical care was time spent personally by me on the following activities: blood draw for specimens, discussions with consultants, obtaining history from patient or surrogate, ordering and review of laboratory studies, evaluation of patient's response to treatment, pulse oximetry, review of old charts, re-evaluation of patient's condition, ordering and review of radiographic studies, ordering and performing treatments and interventions, interpretation of cardiac output measurements, development of treatment plan with patient or surrogate and examination of patient.        Labs Reviewed   COMPREHENSIVE METABOLIC PANEL - Abnormal; Notable for the following components:       Result Value    CO2 21 (*)     All other components within normal limits   TROPONIN I - Abnormal; Notable for the following components:    Troponin I 1.976 (*)     All other components within normal limits   CBC W/ AUTO DIFFERENTIAL   B-TYPE NATRIURETIC PEPTIDE   TROPONIN I   APTT   PROTIME-INR   SARS-COV-2 RDRP GENE     Results for orders placed or performed during the hospital encounter of 04/02/22   CBC auto differential   Result Value Ref Range    WBC 9.52 3.90 - 12.70 K/uL    RBC 5.35 4.60 - 6.20 M/uL    Hemoglobin 16.4 14.0 - 18.0 g/dL    Hematocrit 47.8 40.0 - 54.0 %    MCV 89 82 - 98 fL    MCH 30.7 27.0 - 31.0 pg    MCHC 34.3 32.0 - 36.0 g/dL    RDW 13.4 11.5 - 14.5 %    Platelets 282 150 - 450 K/uL    MPV 9.7 9.2 - 12.9 fL    Immature Granulocytes 0.3 0.0 - 0.5 %    Gran # (ANC) 4.7 1.8 - 7.7 K/uL    Immature Grans (Abs) 0.03 0.00 - 0.04 K/uL    Lymph # 3.7 1.0 - 4.8 K/uL    Mono # 0.8 0.3 - 1.0 K/uL    Eos # 0.3 0.0 - 0.5 K/uL    Baso # 0.08 0.00 - 0.20 K/uL    nRBC 0 0 /100 WBC    Gran % 49.3 38.0 - 73.0 %    Lymph % 38.8 18.0 - 48.0 %    Mono % 8.0 4.0 - 15.0 %    Eosinophil % 2.8 0.0 - 8.0 %    Basophil % 0.8 0.0 - 1.9 %    Differential Method Automated    Comprehensive  metabolic panel   Result Value Ref Range    Sodium 140 136 - 145 mmol/L    Potassium 4.2 3.5 - 5.1 mmol/L    Chloride 108 95 - 110 mmol/L    CO2 21 (L) 23 - 29 mmol/L    Glucose 99 70 - 110 mg/dL    BUN 17 6 - 20 mg/dL    Creatinine 0.8 0.5 - 1.4 mg/dL    Calcium 9.1 8.7 - 10.5 mg/dL    Total Protein 7.2 6.0 - 8.4 g/dL    Albumin 3.7 3.5 - 5.2 g/dL    Total Bilirubin 0.4 0.1 - 1.0 mg/dL    Alkaline Phosphatase 60 55 - 135 U/L    AST 36 10 - 40 U/L    ALT 30 10 - 44 U/L    Anion Gap 11 8 - 16 mmol/L    eGFR if African American >60.0 >60 mL/min/1.73 m^2    eGFR if non African American >60.0 >60 mL/min/1.73 m^2   Troponin I #1   Result Value Ref Range    Troponin I 1.976 (H) 0.000 - 0.026 ng/mL   BNP   Result Value Ref Range    BNP 94 0 - 99 pg/mL   POCT COVID-19 Rapid Screening   Result Value Ref Range    POC Rapid COVID Negative Negative     Acceptable Yes        EKG Readings: (Independently Interpreted)   Initial Reading: No STEMI. Rhythm: Sinus Tachycardia. Heart Rate: 105. Conduction: LBBB. ST Segments: Normal ST Segments. T Waves: Normal. Axis: Left Axis Deviation. Clinical Impression: Sinus Tachycardia with LBBB       Imaging Results          X-Ray Chest AP Portable (Final result)  Result time 04/02/22 19:23:45    Final result by Javan Olvera MD (04/02/22 19:23:45)                 Impression:     No acute findings.    Finalized on: 4/2/2022 7:23 PM By:  Javan Olvera MD  [facility]# [id number]      2022-04-02 19:25:57.127    [facility]             Narrative:    EXAM: XR CHEST AP PORTABLE    CLINICAL HISTORY: Chest pain    FINDINGS:  The heart is normal in size.  Lungs are clear of acute infiltrate or consolidation.                              7:05 PM Cardiology Consult- d/w case with Cardiology Dr Burke sanchez LBBB- recs starting Heparin if Trop is elevated. Agree c OBS for serial enzymes. Pt is CP free at moment/currently.    8:04 PM Discussed lab/imaging studies with patient and the need for further  evaluation/admission for ACS. Pt verbalized understanding that this is a stand alone ER and we are unable to admit at this facility. Pt will be transferred to [facility] via [facility] with care en route to include CM. I discussed this case with HS and care was accepted by Dr Funes.    Pt remains CP free. Plan on Cath in am. Heparin drip. Plavix load. Statin.         Medications   heparin 25,000 units in dextrose 5% 250 mL (100 units/mL) infusion LOW INTENSITY nomogram - [facility] (has no administration in time range)   heparin 25,000 units in dextrose 5% (100 units/ml) IV bolus from bag - ADDITIONAL PRN BOLUS - 60 units/kg (max bolus 4000 units) (has no administration in time range)   heparin 25,000 units in dextrose 5% (100 units/ml) IV bolus from bag - ADDITIONAL PRN BOLUS - 30 units/kg (max bolus 4000 units) (has no administration in time range)   atorvastatin tablet 80 mg (has no administration in time range)   aspirin tablet 325 mg (325 mg Oral Given 4/2/22 1845)   nitroGLYCERIN 2% TD oint ointment 1 inch (1 inch Topical (Top) Given 4/2/22 1845)   morphine injection 2 mg (2 mg Intravenous Given 4/2/22 1917)   ondansetron injection 4 mg (4 mg Intravenous Given 4/2/22 1916)   metoprolol injection 5 mg (5 mg Intravenous Given 4/2/22 1917)   heparin 25,000 units in dextrose 5% (100 units/ml) IV bolus from bag INITIAL BOLUS (max bolus 4000 units) (4,000 Units Intravenous Bolus from Bag 4/2/22 2003)                          Clinical Impression:   Final diagnoses:  [R07.9] Chest pain  [I24.9] ACS (acute coronary syndrome) (Primary)          ED Disposition Condition    Admit               Emerson Murdock MD  04/02/22 2006

## 2022-04-03 PROBLEM — I21.4 NSTEMI (NON-ST ELEVATED MYOCARDIAL INFARCTION): Status: ACTIVE | Noted: 2022-04-03

## 2022-04-03 PROBLEM — I10 PRIMARY HYPERTENSION: Chronic | Status: ACTIVE | Noted: 2022-04-03

## 2022-04-03 LAB
ANION GAP SERPL CALC-SCNC: 12 MMOL/L (ref 8–16)
AORTIC ROOT ANNULUS: 2.14 CM
APTT BLDCRRT: 31.1 SEC (ref 21–32)
APTT BLDCRRT: 46.1 SEC (ref 21–32)
AV INDEX (PROSTH): 0.67
AV MEAN GRADIENT: 4 MMHG
AV PEAK GRADIENT: 5 MMHG
AV VALVE AREA: 2.81 CM2
AV VELOCITY RATIO: 0.69
BASOPHILS # BLD AUTO: 0.08 K/UL (ref 0–0.2)
BASOPHILS NFR BLD: 0.8 % (ref 0–1.9)
BSA FOR ECHO PROCEDURE: 2.2 M2
BUN SERPL-MCNC: 19 MG/DL (ref 6–20)
CALCIUM SERPL-MCNC: 9.4 MG/DL (ref 8.7–10.5)
CHLORIDE SERPL-SCNC: 105 MMOL/L (ref 95–110)
CHOLEST SERPL-MCNC: 191 MG/DL (ref 120–199)
CHOLEST/HDLC SERPL: 7.1 {RATIO} (ref 2–5)
CO2 SERPL-SCNC: 23 MMOL/L (ref 23–29)
CREAT SERPL-MCNC: 0.9 MG/DL (ref 0.5–1.4)
CV ECHO LV RWT: 0.48 CM
DIFFERENTIAL METHOD: NORMAL
DOP CALC AO PEAK VEL: 1.15 M/S
DOP CALC AO VTI: 23 CM
DOP CALC LVOT AREA: 4.2 CM2
DOP CALC LVOT DIAMETER: 2.32 CM
DOP CALC LVOT PEAK VEL: 0.79 M/S
DOP CALC LVOT STROKE VOLUME: 64.65 CM3
DOP CALC MV VTI: 32.4 CM
DOP CALCLVOT PEAK VEL VTI: 15.3 CM
E WAVE DECELERATION TIME: 102.13 MSEC
E/A RATIO: 1.75
E/E' RATIO: 16.46 M/S
ECHO EF ESTIMATED: 34 %
ECHO LV POSTERIOR WALL: 1.31 CM (ref 0.6–1.1)
EJECTION FRACTION: 30 %
EOSINOPHIL # BLD AUTO: 0.3 K/UL (ref 0–0.5)
EOSINOPHIL NFR BLD: 3.1 % (ref 0–8)
ERYTHROCYTE [DISTWIDTH] IN BLOOD BY AUTOMATED COUNT: 13.3 % (ref 11.5–14.5)
EST. GFR  (AFRICAN AMERICAN): >60 ML/MIN/1.73 M^2
EST. GFR  (NON AFRICAN AMERICAN): >60 ML/MIN/1.73 M^2
ESTIMATED AVG GLUCOSE: 108 MG/DL (ref 68–131)
FRACTIONAL SHORTENING: 16 % (ref 28–44)
GLUCOSE SERPL-MCNC: 97 MG/DL (ref 70–110)
HBA1C MFR BLD: 5.4 % (ref 4–5.6)
HCT VFR BLD AUTO: 49.2 % (ref 40–54)
HDLC SERPL-MCNC: 27 MG/DL (ref 40–75)
HDLC SERPL: 14.1 % (ref 20–50)
HGB BLD-MCNC: 16.4 G/DL (ref 14–18)
IMM GRANULOCYTES # BLD AUTO: 0.04 K/UL (ref 0–0.04)
IMM GRANULOCYTES NFR BLD AUTO: 0.4 % (ref 0–0.5)
INTERVENTRICULAR SEPTUM: 0.97 CM (ref 0.6–1.1)
IVC DIAMETER: 1.74 CM
LA MAJOR: 5.07 CM
LA MINOR: 4.73 CM
LA WIDTH: 4.24 CM
LDLC SERPL CALC-MCNC: 109.6 MG/DL (ref 63–159)
LEFT ATRIUM SIZE: 3.88 CM
LEFT ATRIUM VOLUME INDEX: 32.1 ML/M2
LEFT ATRIUM VOLUME: 68.44 CM3
LEFT INTERNAL DIMENSION IN SYSTOLE: 4.55 CM (ref 2.1–4)
LEFT VENTRICLE DIASTOLIC VOLUME INDEX: 67.26 ML/M2
LEFT VENTRICLE DIASTOLIC VOLUME: 143.27 ML
LEFT VENTRICLE MASS INDEX: 117 G/M2
LEFT VENTRICLE SYSTOLIC VOLUME INDEX: 44.6 ML/M2
LEFT VENTRICLE SYSTOLIC VOLUME: 95.03 ML
LEFT VENTRICULAR INTERNAL DIMENSION IN DIASTOLE: 5.43 CM (ref 3.5–6)
LEFT VENTRICULAR MASS: 248.71 G
LV LATERAL E/E' RATIO: 15.29 M/S
LV SEPTAL E/E' RATIO: 17.83 M/S
LVOT MG: 1.81 MMHG
LVOT MV: 0.65 CM/S
LYMPHOCYTES # BLD AUTO: 4.3 K/UL (ref 1–4.8)
LYMPHOCYTES NFR BLD: 42.5 % (ref 18–48)
MCH RBC QN AUTO: 30.1 PG (ref 27–31)
MCHC RBC AUTO-ENTMCNC: 33.3 G/DL (ref 32–36)
MCV RBC AUTO: 90 FL (ref 82–98)
MONOCYTES # BLD AUTO: 0.6 K/UL (ref 0.3–1)
MONOCYTES NFR BLD: 6.2 % (ref 4–15)
MV MEAN GRADIENT: 3 MMHG
MV PEAK A VEL: 0.61 M/S
MV PEAK E VEL: 1.07 M/S
MV PEAK GRADIENT: 7 MMHG
MV STENOSIS PRESSURE HALF TIME: 29.62 MS
MV VALVE AREA BY CONTINUITY EQUATION: 2 CM2
MV VALVE AREA P 1/2 METHOD: 7.43 CM2
NEUTROPHILS # BLD AUTO: 4.7 K/UL (ref 1.8–7.7)
NEUTROPHILS NFR BLD: 47 % (ref 38–73)
NONHDLC SERPL-MCNC: 164 MG/DL
NRBC BLD-RTO: 0 /100 WBC
PISA MRMAX VEL: 4.17 M/S
PISA TR MAX VEL: 2.51 M/S
PLATELET # BLD AUTO: 300 K/UL (ref 150–450)
PMV BLD AUTO: 9.6 FL (ref 9.2–12.9)
POC ACTIVATED CLOTTING TIME K: 225 SEC (ref 74–137)
POC ACTIVATED CLOTTING TIME K: 309 SEC (ref 74–137)
POCT GLUCOSE: 124 MG/DL (ref 70–110)
POCT GLUCOSE: 87 MG/DL (ref 70–110)
POTASSIUM SERPL-SCNC: 4.5 MMOL/L (ref 3.5–5.1)
PULM VEIN S/D RATIO: 1.42
PV MV: 0.64 M/S
PV PEAK D VEL: 0.45 M/S
PV PEAK S VEL: 0.64 M/S
PV PEAK VELOCITY: 0.92 CM/S
RA PRESSURE: 3 MMHG
RBC # BLD AUTO: 5.45 M/UL (ref 4.6–6.2)
RIGHT VENTRICULAR END-DIASTOLIC DIMENSION: 1.7 CM
SAMPLE: ABNORMAL
SAMPLE: ABNORMAL
SINUS: 2.45 CM
SODIUM SERPL-SCNC: 140 MMOL/L (ref 136–145)
TDI LATERAL: 0.07 M/S
TDI SEPTAL: 0.06 M/S
TDI: 0.07 M/S
TR MAX PG: 25 MMHG
TRICUSPID ANNULAR PLANE SYSTOLIC EXCURSION: 2.02 CM
TRIGL SERPL-MCNC: 272 MG/DL (ref 30–150)
TROPONIN I SERPL DL<=0.01 NG/ML-MCNC: 3.05 NG/ML (ref 0–0.03)
TROPONIN I SERPL DL<=0.01 NG/ML-MCNC: 3.12 NG/ML (ref 0–0.03)
TROPONIN I SERPL DL<=0.01 NG/ML-MCNC: 3.31 NG/ML (ref 0–0.03)
TV REST PULMONARY ARTERY PRESSURE: 28 MMHG
WBC # BLD AUTO: 10 K/UL (ref 3.9–12.7)

## 2022-04-03 PROCEDURE — 92928 PRQ TCAT PLMT NTRAC ST 1 LES: CPT | Mod: LD,,, | Performed by: INTERNAL MEDICINE

## 2022-04-03 PROCEDURE — 94761 N-INVAS EAR/PLS OXIMETRY MLT: CPT

## 2022-04-03 PROCEDURE — 85730 THROMBOPLASTIN TIME PARTIAL: CPT | Performed by: INTERNAL MEDICINE

## 2022-04-03 PROCEDURE — 25000003 PHARM REV CODE 250: Performed by: EMERGENCY MEDICINE

## 2022-04-03 PROCEDURE — 63600175 PHARM REV CODE 636 W HCPCS: Performed by: INTERNAL MEDICINE

## 2022-04-03 PROCEDURE — 25000003 PHARM REV CODE 250: Performed by: NURSE PRACTITIONER

## 2022-04-03 PROCEDURE — 36415 COLL VENOUS BLD VENIPUNCTURE: CPT | Performed by: INTERNAL MEDICINE

## 2022-04-03 PROCEDURE — 99223 1ST HOSP IP/OBS HIGH 75: CPT | Mod: ,,, | Performed by: INTERNAL MEDICINE

## 2022-04-03 PROCEDURE — 27201423 OPTIME MED/SURG SUP & DEVICES STERILE SUPPLY: Performed by: INTERNAL MEDICINE

## 2022-04-03 PROCEDURE — C1725 CATH, TRANSLUMIN NON-LASER: HCPCS | Performed by: INTERNAL MEDICINE

## 2022-04-03 PROCEDURE — 21400001 HC TELEMETRY ROOM

## 2022-04-03 PROCEDURE — 25000003 PHARM REV CODE 250: Performed by: INTERNAL MEDICINE

## 2022-04-03 PROCEDURE — 99223 PR INITIAL HOSPITAL CARE,LEVL III: ICD-10-PCS | Mod: ,,, | Performed by: INTERNAL MEDICINE

## 2022-04-03 PROCEDURE — 85347 COAGULATION TIME ACTIVATED: CPT | Performed by: INTERNAL MEDICINE

## 2022-04-03 PROCEDURE — 92920 PRQ TRLUML C ANGIOP 1ART&/BR: CPT | Performed by: INTERNAL MEDICINE

## 2022-04-03 PROCEDURE — C1760 CLOSURE DEV, VASC: HCPCS | Performed by: INTERNAL MEDICINE

## 2022-04-03 PROCEDURE — 36415 COLL VENOUS BLD VENIPUNCTURE: CPT | Performed by: NURSE PRACTITIONER

## 2022-04-03 PROCEDURE — 25000242 PHARM REV CODE 250 ALT 637 W/ HCPCS: Performed by: NURSE PRACTITIONER

## 2022-04-03 PROCEDURE — 93458 PR CATH PLACE/CORON ANGIO, IMG SUPER/INTERP,W LEFT HEART VENTRICULOGRAPHY: ICD-10-PCS | Mod: 26,59,51, | Performed by: INTERNAL MEDICINE

## 2022-04-03 PROCEDURE — C1769 GUIDE WIRE: HCPCS | Performed by: INTERNAL MEDICINE

## 2022-04-03 PROCEDURE — S4991 NICOTINE PATCH NONLEGEND: HCPCS | Performed by: NURSE PRACTITIONER

## 2022-04-03 PROCEDURE — 92920 PR PTCA: ICD-10-PCS | Mod: LD,59,51, | Performed by: INTERNAL MEDICINE

## 2022-04-03 PROCEDURE — 84484 ASSAY OF TROPONIN QUANT: CPT | Performed by: NURSE PRACTITIONER

## 2022-04-03 PROCEDURE — C9600 PERC DRUG-EL COR STENT SING: HCPCS | Performed by: INTERNAL MEDICINE

## 2022-04-03 PROCEDURE — 25500020 PHARM REV CODE 255: Performed by: INTERNAL MEDICINE

## 2022-04-03 PROCEDURE — 80048 BASIC METABOLIC PNL TOTAL CA: CPT | Performed by: NURSE PRACTITIONER

## 2022-04-03 PROCEDURE — 93458 L HRT ARTERY/VENTRICLE ANGIO: CPT | Performed by: INTERNAL MEDICINE

## 2022-04-03 PROCEDURE — 99152 MOD SED SAME PHYS/QHP 5/>YRS: CPT | Mod: ,,, | Performed by: INTERNAL MEDICINE

## 2022-04-03 PROCEDURE — C1887 CATHETER, GUIDING: HCPCS | Performed by: INTERNAL MEDICINE

## 2022-04-03 PROCEDURE — 99153 MOD SED SAME PHYS/QHP EA: CPT | Performed by: INTERNAL MEDICINE

## 2022-04-03 PROCEDURE — 92920 PRQ TRLUML C ANGIOP 1ART&/BR: CPT | Mod: LD,59,51, | Performed by: INTERNAL MEDICINE

## 2022-04-03 PROCEDURE — C1894 INTRO/SHEATH, NON-LASER: HCPCS | Performed by: INTERNAL MEDICINE

## 2022-04-03 PROCEDURE — 85025 COMPLETE CBC W/AUTO DIFF WBC: CPT | Performed by: EMERGENCY MEDICINE

## 2022-04-03 PROCEDURE — C1874 STENT, COATED/COV W/DEL SYS: HCPCS | Performed by: INTERNAL MEDICINE

## 2022-04-03 PROCEDURE — 93458 L HRT ARTERY/VENTRICLE ANGIO: CPT | Mod: 26,59,51, | Performed by: INTERNAL MEDICINE

## 2022-04-03 PROCEDURE — 80061 LIPID PANEL: CPT | Performed by: NURSE PRACTITIONER

## 2022-04-03 PROCEDURE — 83036 HEMOGLOBIN GLYCOSYLATED A1C: CPT | Performed by: NURSE PRACTITIONER

## 2022-04-03 PROCEDURE — 99152 PR MOD CONSCIOUS SEDATION, SAME PHYS, 5+ YRS, FIRST 15 MIN: ICD-10-PCS | Mod: ,,, | Performed by: INTERNAL MEDICINE

## 2022-04-03 PROCEDURE — 84484 ASSAY OF TROPONIN QUANT: CPT | Mod: 91 | Performed by: NURSE PRACTITIONER

## 2022-04-03 PROCEDURE — 92928 PR STENT: ICD-10-PCS | Mod: LD,,, | Performed by: INTERNAL MEDICINE

## 2022-04-03 PROCEDURE — 99152 MOD SED SAME PHYS/QHP 5/>YRS: CPT | Performed by: INTERNAL MEDICINE

## 2022-04-03 DEVICE — STENT RONYX30022UX RESOLUTE ONYX 3.00X22
Type: IMPLANTABLE DEVICE | Site: HEART | Status: FUNCTIONAL
Brand: RESOLUTE ONYX™

## 2022-04-03 DEVICE — STENT RONYX30026UX RESOLUTE ONYX 3.00X26
Type: IMPLANTABLE DEVICE | Site: HEART | Status: FUNCTIONAL
Brand: RESOLUTE ONYX™

## 2022-04-03 RX ORDER — FENTANYL CITRATE 50 UG/ML
INJECTION, SOLUTION INTRAMUSCULAR; INTRAVENOUS
Status: DISCONTINUED | OUTPATIENT
Start: 2022-04-03 | End: 2022-04-03 | Stop reason: HOSPADM

## 2022-04-03 RX ORDER — HYDROMORPHONE HCL 2 MG/ML
VIAL (ML) INJECTION
Status: DISCONTINUED | OUTPATIENT
Start: 2022-04-03 | End: 2022-04-03 | Stop reason: HOSPADM

## 2022-04-03 RX ORDER — CLOPIDOGREL 300 MG/1
TABLET, FILM COATED ORAL
Status: DISCONTINUED | OUTPATIENT
Start: 2022-04-03 | End: 2022-04-03 | Stop reason: HOSPADM

## 2022-04-03 RX ORDER — SODIUM CHLORIDE 9 MG/ML
INJECTION, SOLUTION INTRAVENOUS CONTINUOUS
Status: ACTIVE | OUTPATIENT
Start: 2022-04-03 | End: 2022-04-03

## 2022-04-03 RX ORDER — PHENYLEPHRINE HYDROCHLORIDE 10 MG/ML
INJECTION INTRAVENOUS
Status: DISCONTINUED | OUTPATIENT
Start: 2022-04-03 | End: 2022-04-03 | Stop reason: HOSPADM

## 2022-04-03 RX ORDER — OXYCODONE HYDROCHLORIDE 5 MG/1
10 TABLET ORAL EVERY 4 HOURS PRN
Status: DISCONTINUED | OUTPATIENT
Start: 2022-04-03 | End: 2022-04-04 | Stop reason: HOSPADM

## 2022-04-03 RX ORDER — MIDAZOLAM HYDROCHLORIDE 1 MG/ML
INJECTION INTRAMUSCULAR; INTRAVENOUS
Status: DISCONTINUED | OUTPATIENT
Start: 2022-04-03 | End: 2022-04-03 | Stop reason: HOSPADM

## 2022-04-03 RX ORDER — ATROPINE SULFATE 0.1 MG/ML
0.5 INJECTION INTRAVENOUS
Status: DISCONTINUED | OUTPATIENT
Start: 2022-04-03 | End: 2022-04-04 | Stop reason: HOSPADM

## 2022-04-03 RX ORDER — HEPARIN SODIUM 1000 [USP'U]/ML
INJECTION, SOLUTION INTRAVENOUS; SUBCUTANEOUS
Status: DISCONTINUED | OUTPATIENT
Start: 2022-04-03 | End: 2022-04-03 | Stop reason: HOSPADM

## 2022-04-03 RX ORDER — SODIUM CHLORIDE 9 MG/ML
INJECTION, SOLUTION INTRAVENOUS
Status: DISCONTINUED | OUTPATIENT
Start: 2022-04-03 | End: 2022-04-04 | Stop reason: HOSPADM

## 2022-04-03 RX ORDER — LIDOCAINE HYDROCHLORIDE 20 MG/ML
INJECTION, SOLUTION EPIDURAL; INFILTRATION; INTRACAUDAL; PERINEURAL
Status: DISCONTINUED | OUTPATIENT
Start: 2022-04-03 | End: 2022-04-03 | Stop reason: HOSPADM

## 2022-04-03 RX ORDER — CLOPIDOGREL BISULFATE 75 MG/1
75 TABLET ORAL DAILY
Status: DISCONTINUED | OUTPATIENT
Start: 2022-04-03 | End: 2022-04-04 | Stop reason: HOSPADM

## 2022-04-03 RX ORDER — HYDROCODONE BITARTRATE AND ACETAMINOPHEN 5; 325 MG/1; MG/1
1 TABLET ORAL EVERY 4 HOURS PRN
Status: DISCONTINUED | OUTPATIENT
Start: 2022-04-03 | End: 2022-04-04 | Stop reason: HOSPADM

## 2022-04-03 RX ORDER — ATORVASTATIN CALCIUM 40 MG/1
80 TABLET, FILM COATED ORAL DAILY
Status: DISCONTINUED | OUTPATIENT
Start: 2022-04-03 | End: 2022-04-04 | Stop reason: HOSPADM

## 2022-04-03 RX ADMIN — CLOPIDOGREL 75 MG: 75 TABLET, FILM COATED ORAL at 09:04

## 2022-04-03 RX ADMIN — PANTOPRAZOLE SODIUM 40 MG: 40 TABLET, DELAYED RELEASE ORAL at 09:04

## 2022-04-03 RX ADMIN — ESCITALOPRAM OXALATE 20 MG: 10 TABLET ORAL at 09:04

## 2022-04-03 RX ADMIN — SODIUM CHLORIDE: 0.9 INJECTION, SOLUTION INTRAVENOUS at 01:04

## 2022-04-03 RX ADMIN — Medication 6 MG: at 08:04

## 2022-04-03 RX ADMIN — FENOFIBRATE 160 MG: 160 TABLET ORAL at 09:04

## 2022-04-03 RX ADMIN — NICOTINE 1 PATCH: 21 PATCH, EXTENDED RELEASE TRANSDERMAL at 09:04

## 2022-04-03 RX ADMIN — HYDROCODONE BITARTRATE AND ACETAMINOPHEN 1 TABLET: 5; 325 TABLET ORAL at 06:04

## 2022-04-03 RX ADMIN — METOPROLOL TARTRATE 25 MG: 25 TABLET, FILM COATED ORAL at 09:04

## 2022-04-03 RX ADMIN — ASPIRIN 81 MG: 81 TABLET ORAL at 09:04

## 2022-04-03 RX ADMIN — ATORVASTATIN CALCIUM 80 MG: 40 TABLET, FILM COATED ORAL at 09:04

## 2022-04-03 RX ADMIN — METOPROLOL TARTRATE 25 MG: 25 TABLET, FILM COATED ORAL at 08:04

## 2022-04-03 NOTE — SUBJECTIVE & OBJECTIVE
Past Medical History:   Diagnosis Date    Anxiety     High cholesterol     Hyperlipidemia     Insomnia     Legally blind     Macular degeneration        Past Surgical History:   Procedure Laterality Date    HIP SURGERY      as child left       Review of patient's allergies indicates:  No Known Allergies    No current facility-administered medications on file prior to encounter.     Current Outpatient Medications on File Prior to Encounter   Medication Sig    amoxicillin-clavulanate 875-125mg (AUGMENTIN) 875-125 mg per tablet Take 1 tablet by mouth every 12 (twelve) hours. for 7 days    EScitalopram oxalate (LEXAPRO) 20 MG tablet Take 1 tablet (20 mg total) by mouth once daily.    fenofibrate (TRICOR) 54 MG tablet Take 1 tablet (54 mg total) by mouth once daily.    pantoprazole (PROTONIX) 40 MG tablet Take 1 tablet (40 mg total) by mouth once daily.    traZODone (DESYREL) 100 MG tablet Take 1 tablet (100 mg total) by mouth nightly.     Family History       Problem Relation (Age of Onset)    Diabetes Father    Heart attacks under age 50 Paternal Grandmother    Hyperlipidemia Father    Macular degeneration Mother, Maternal Grandfather          Tobacco Use    Smoking status: Current Every Day Smoker     Packs/day: 1.00     Types: Cigarettes    Smokeless tobacco: Never Used   Substance and Sexual Activity    Alcohol use: Yes     Comment: rarely    Drug use: No    Sexual activity: Yes     Partners: Female     Review of Systems   Constitutional:  Negative for chills, diaphoresis, fatigue and fever.   HENT:  Negative for congestion, postnasal drip, rhinorrhea, sinus pressure and sore throat.    Respiratory:  Positive for shortness of breath. Negative for cough and wheezing.    Cardiovascular:  Positive for chest pain. Negative for palpitations and leg swelling.   Gastrointestinal:  Negative for abdominal pain, diarrhea, nausea and vomiting.   Genitourinary:  Negative for dysuria and hematuria.   Musculoskeletal:  Negative  for arthralgias, back pain and myalgias.   Skin:  Negative for pallor and rash.   Neurological:  Negative for dizziness, syncope, weakness, light-headedness, numbness and headaches.   Psychiatric/Behavioral:  The patient is nervous/anxious.    All other systems reviewed and are negative.  Objective:     Vital Signs (Most Recent):  Temp: 98.3 °F (36.8 °C) (04/02/22 1833)  Pulse: 96 (04/02/22 2300)  Resp: 20 (04/02/22 2300)  BP: 99/63 (04/02/22 2300)  SpO2: 97 % (04/02/22 2300) Vital Signs (24h Range):  Temp:  [98.3 °F (36.8 °C)] 98.3 °F (36.8 °C)  Pulse:  [] 96  Resp:  [18-30] 20  SpO2:  [93 %-97 %] 97 %  BP: ()/(55-85) 99/63     Weight: 104.9 kg (231 lb 4.2 oz)  Body mass index is 36.22 kg/m².    Physical Exam  Vitals and nursing note reviewed.   Constitutional:       General: He is awake. He is not in acute distress.     Appearance: Normal appearance. He is well-developed. He is not diaphoretic.   HENT:      Head: Normocephalic and atraumatic.   Eyes:      Conjunctiva/sclera: Conjunctivae normal.   Cardiovascular:      Rate and Rhythm: Normal rate and regular rhythm. No extrasystoles are present.     Heart sounds: S1 normal and S2 normal. No murmur heard.  Pulmonary:      Effort: Pulmonary effort is normal. No tachypnea.      Breath sounds: Normal breath sounds and air entry. No wheezing, rhonchi or rales.   Abdominal:      General: Bowel sounds are normal. There is no distension.      Palpations: Abdomen is soft.      Tenderness: There is no abdominal tenderness.   Musculoskeletal:         General: No tenderness or deformity. Normal range of motion.      Cervical back: Normal range of motion and neck supple.      Right lower leg: No edema.      Left lower leg: No edema.   Skin:     General: Skin is warm and dry.      Capillary Refill: Capillary refill takes less than 2 seconds.      Findings: No erythema or rash.   Neurological:      General: No focal deficit present.      Mental Status: He is alert  and oriented to person, place, and time.   Psychiatric:         Mood and Affect: Mood and affect normal.         Behavior: Behavior normal. Behavior is cooperative.           Significant Labs:  Results for orders placed or performed during the hospital encounter of 04/02/22   CBC auto differential   Result Value Ref Range    WBC 9.52 3.90 - 12.70 K/uL    RBC 5.35 4.60 - 6.20 M/uL    Hemoglobin 16.4 14.0 - 18.0 g/dL    Hematocrit 47.8 40.0 - 54.0 %    MCV 89 82 - 98 fL    MCH 30.7 27.0 - 31.0 pg    MCHC 34.3 32.0 - 36.0 g/dL    RDW 13.4 11.5 - 14.5 %    Platelets 282 150 - 450 K/uL    MPV 9.7 9.2 - 12.9 fL    Immature Granulocytes 0.3 0.0 - 0.5 %    Gran # (ANC) 4.7 1.8 - 7.7 K/uL    Immature Grans (Abs) 0.03 0.00 - 0.04 K/uL    Lymph # 3.7 1.0 - 4.8 K/uL    Mono # 0.8 0.3 - 1.0 K/uL    Eos # 0.3 0.0 - 0.5 K/uL    Baso # 0.08 0.00 - 0.20 K/uL    nRBC 0 0 /100 WBC    Gran % 49.3 38.0 - 73.0 %    Lymph % 38.8 18.0 - 48.0 %    Mono % 8.0 4.0 - 15.0 %    Eosinophil % 2.8 0.0 - 8.0 %    Basophil % 0.8 0.0 - 1.9 %    Differential Method Automated    Comprehensive metabolic panel   Result Value Ref Range    Sodium 140 136 - 145 mmol/L    Potassium 4.2 3.5 - 5.1 mmol/L    Chloride 108 95 - 110 mmol/L    CO2 21 (L) 23 - 29 mmol/L    Glucose 99 70 - 110 mg/dL    BUN 17 6 - 20 mg/dL    Creatinine 0.8 0.5 - 1.4 mg/dL    Calcium 9.1 8.7 - 10.5 mg/dL    Total Protein 7.2 6.0 - 8.4 g/dL    Albumin 3.7 3.5 - 5.2 g/dL    Total Bilirubin 0.4 0.1 - 1.0 mg/dL    Alkaline Phosphatase 60 55 - 135 U/L    AST 36 10 - 40 U/L    ALT 30 10 - 44 U/L    Anion Gap 11 8 - 16 mmol/L    eGFR if African American >60.0 >60 mL/min/1.73 m^2    eGFR if non African American >60.0 >60 mL/min/1.73 m^2   Troponin I #1   Result Value Ref Range    Troponin I 1.976 (H) 0.000 - 0.026 ng/mL   BNP   Result Value Ref Range    BNP 94 0 - 99 pg/mL   APTT   Result Value Ref Range    aPTT 29.6 21.0 - 32.0 sec   Protime-INR   Result Value Ref Range    Prothrombin Time  11.6 9.0 - 12.5 sec    INR 1.1 0.8 - 1.2   POCT COVID-19 Rapid Screening   Result Value Ref Range    POC Rapid COVID Negative Negative     Acceptable Yes       All pertinent labs within the past 24 hours have been reviewed.    Significant Imaging:  Imaging Results              X-Ray Chest AP Portable (Final result)  Result time 04/02/22 19:23:45      Final result by Javan Olvera MD (04/02/22 19:23:45)                   Impression:     No acute findings.    Finalized on: 4/2/2022 7:23 PM By:  Javan Olvera MD  [facility]# [id number]      2022-04-02 19:25:57.127    [facility]               Narrative:    EXAM: XR CHEST AP PORTABLE    CLINICAL HISTORY: Chest pain    FINDINGS:  The heart is normal in size.  Lungs are clear of acute infiltrate or consolidation.                                       I have reviewed all pertinent imaging results/findings within the past 24 hours.      EKG: (personally reviewed)  Sinus tachycardia, LBBB (new). No STEMI per Dr. Turk (Cardiology).

## 2022-04-03 NOTE — PLAN OF CARE
MADELINE complete discharge assessment with pt's mother. Pt lives at home alone, however his mother will be his help. Pt is able to perform ADLs. No needs identified at this time. Pt's PCP is Dr. Adrian Lacy. MADELINE provided a transitional care folder, information on advanced directives, information on pharmacy bedside delivery, and discharge planning begins on admission with contact information for any needs/questions.   [facility] - Cath Lab (Hospital)  Initial Discharge Assessment       Primary Care Provider: Luana Hall NP    Admission Diagnosis: ACS (acute coronary syndrome) [I24.9]  Chest pain [R07.9]    Admission Date: 4/2/2022  Expected Discharge Date:     Discharge Barriers Identified: None    Payor: MEDICARE / Plan: MEDICARE PART A & B / Product Type: Government /     Extended Emergency Contact Information  Primary Emergency Contact: Simran Gonzales  Address: [address] [place] [address] [id number] [place] [address]  Home Phone: [phone]  Relation: Mother  Secondary Emergency Contact: Katheryn Golden  Mobile Phone: [phone]  Relation: Spouse  Preferred language: English   needed? No    Discharge Plan A: Home  Discharge Plan B: Home      [facility] - [place], [place] - [address] [facility]  [address] [facility] [address]  Phone: [phone] Fax: [phone]    [id number] - [place], [place] - [id number] [facility]  [place] [address]  Phone: [phone] Fax: [phone]      Initial Assessment (most recent)     Adult Discharge Assessment - 04/03/22 1014        Discharge Assessment    Assessment Type Discharge Planning Assessment     Confirmed/corrected address, phone number and insurance Yes     Confirmed Demographics Correct on Facesheet     Source of Information family     If unable to respond/provide information was family/caregiver contacted? Yes     Contact Name/Number Simran Gonzales (mother) [phone]     Communicated HARDIK with  patient/caregiver Yes     Lives With alone     Facility Arrived From: home     Do you expect to return to your current living situation? Yes     Do you have help at home or someone to help you manage your care at home? Yes     Who are your caregiver(s) and their phone number(s)? Simran Gonzales (mother) [phone]     Prior to hospitilization cognitive status: Alert/Oriented     Current cognitive status: Alert/Oriented     Walking or Climbing Stairs Difficulty none     Dressing/Bathing Difficulty none     Home Layout Able to live on 1st floor     Equipment Currently Used at Home none     Readmission within 30 days? No     Patient currently being followed by outpatient case management? No     Do you currently have service(s) that help you manage your care at home? No     Do you take prescription medications? Yes     Do you have prescription coverage? Yes     Do you have any problems affording any of your prescribed medications? No     Is the patient taking medications as prescribed? yes     Who is going to help you get home at discharge? Simran Gonzales (mother) [phone]     How do you get to doctors appointments? car, drives self     Are you on dialysis? No     Do you take coumadin? No     Discharge Plan A Home     Discharge Plan B Home     DME Needed Upon Discharge  none     Discharge Plan discussed with: Parent(s)     Name(s) and Number(s) Simran Gonzales (mother) [phone]     Discharge Barriers Identified None

## 2022-04-03 NOTE — PROGRESS NOTES
Right groin puncture site, soft totouch. No hematoma or bruising noted at this time. Neurovascular checks WNL. Patient denies any c/o pain at this time. Tolerating PO diet without difficulty/ Bedrest complete will monitor.

## 2022-04-03 NOTE — SUBJECTIVE & OBJECTIVE
Past Medical History:   Diagnosis Date    Anxiety     High cholesterol     Hyperlipidemia     Insomnia     Legally blind     Macular degeneration        Past Surgical History:   Procedure Laterality Date    HIP SURGERY      as child left       Review of patient's allergies indicates:  No Known Allergies    No current facility-administered medications on file prior to encounter.     Current Outpatient Medications on File Prior to Encounter   Medication Sig    amoxicillin-clavulanate 875-125mg (AUGMENTIN) 875-125 mg per tablet Take 1 tablet by mouth every 12 (twelve) hours. for 7 days    EScitalopram oxalate (LEXAPRO) 20 MG tablet Take 1 tablet (20 mg total) by mouth once daily.    fenofibrate (TRICOR) 54 MG tablet Take 1 tablet (54 mg total) by mouth once daily.    pantoprazole (PROTONIX) 40 MG tablet Take 1 tablet (40 mg total) by mouth once daily.    traZODone (DESYREL) 100 MG tablet Take 1 tablet (100 mg total) by mouth nightly.     Family History       Problem Relation (Age of Onset)    Diabetes Father    Heart attacks under age 50 Paternal Grandmother    Hyperlipidemia Father    Macular degeneration Mother, Maternal Grandfather          Tobacco Use    Smoking status: Current Every Day Smoker     Packs/day: 1.00     Types: Cigarettes    Smokeless tobacco: Never Used   Substance and Sexual Activity    Alcohol use: Yes     Comment: rarely    Drug use: No    Sexual activity: Yes     Partners: Female     Review of Systems   Constitutional: Negative for decreased appetite, diaphoresis, fever, malaise/fatigue and night sweats.   HENT:  Negative for nosebleeds.    Eyes:  Positive for vision loss in left eye and vision loss in right eye. Negative for blurred vision and double vision.   Cardiovascular:  Positive for chest pain and dyspnea on exertion. Negative for claudication, irregular heartbeat, leg swelling, near-syncope, orthopnea, palpitations, paroxysmal nocturnal dyspnea and syncope.   Respiratory:  Negative for  cough, shortness of breath, sleep disturbances due to breathing, snoring, sputum production and wheezing.    Endocrine: Negative for cold intolerance and polyuria.   Hematologic/Lymphatic: Does not bruise/bleed easily.   Skin:  Negative for rash.   Musculoskeletal:  Positive for arthritis, back pain and joint pain. Negative for falls, joint swelling and neck pain.   Gastrointestinal:  Negative for abdominal pain, heartburn, nausea and vomiting.   Genitourinary:  Negative for dysuria, frequency and hematuria.   Neurological:  Negative for difficulty with concentration, dizziness, focal weakness, headaches, light-headedness, numbness, seizures and weakness.   Psychiatric/Behavioral:  Negative for depression, memory loss and substance abuse. The patient does not have insomnia.    Allergic/Immunologic: Negative for HIV exposure and hives.   Objective:     Vital Signs (Most Recent):  Temp: 97.8 °F (36.6 °C) (04/03/22 0819)  Pulse: 77 (04/03/22 0838)  Resp: 18 (04/03/22 0838)  BP: 106/69 (04/03/22 0819)  SpO2: (!) 92 % (04/03/22 0838)   Vital Signs (24h Range):  Temp:  [97.8 °F (36.6 °C)-98.5 °F (36.9 °C)] 97.8 °F (36.6 °C)  Pulse:  [] 77  Resp:  [18-30] 18  SpO2:  [91 %-97 %] 92 %  BP: ()/(54-85) 106/69     Weight: 102.1 kg (225 lb 1.4 oz)  Body mass index is 35.25 kg/m².    SpO2: (!) 92 %  O2 Device (Oxygen Therapy): room air    No intake or output data in the 24 hours ending 04/03/22 0911    Lines/Drains/Airways       Peripheral Intravenous Line  Duration                  Peripheral IV - Single Lumen 04/02/22 1845 20 G Left Antecubital <1 day         Peripheral IV - Single Lumen 04/02/22 2013 20 G Right Hand <1 day                    Physical Exam  HENT:      Head: Normocephalic.   Eyes:      Pupils: Pupils are equal, round, and reactive to light.   Neck:      Thyroid: No thyromegaly.      Vascular: Normal carotid pulses. No carotid bruit or JVD.   Cardiovascular:      Rate and Rhythm: Normal rate and  regular rhythm. No extrasystoles are present.     Chest Wall: PMI is not displaced.      Pulses: Normal pulses.      Heart sounds: Normal heart sounds. No murmur heard.    No gallop. No S3 sounds.   Pulmonary:      Effort: No respiratory distress.      Breath sounds: Normal breath sounds. No stridor.   Abdominal:      General: Bowel sounds are normal.      Palpations: Abdomen is soft.      Tenderness: There is no abdominal tenderness. There is no rebound.   Musculoskeletal:         General: Normal range of motion.   Skin:     Findings: No rash.   Neurological:      Mental Status: He is alert and oriented to person, place, and time.   Psychiatric:         Behavior: Behavior normal.       Significant Labs:   Recent Lab Results  (Last 5 results in the past 24 hours)        04/03/22  0626   04/03/22  0245   04/03/22  0128   04/02/22  1958   04/02/22  1925        Anion Gap   12             aPTT   31.1  Comment: aPTT therapeutic range = 39-69 seconds     29.6  Comment: aPTT therapeutic range = 39-69 seconds         Baso #   0.08             Basophil %   0.8             BUN   19             Calcium   9.4             Chloride   105             CO2   23             Creatinine   0.9             Differential Method   Automated             eGFR if    >60             eGFR if non    >60  Comment: Calculation used to obtain the estimated glomerular filtration  rate (eGFR) is the CKD-EPI equation.                Eos #   0.3             Eosinophil %   3.1             Glucose   97             Gran # (ANC)   4.7             Gran %   47.0             Hematocrit   49.2             Hemoglobin   16.4             Immature Grans (Abs)   0.04  Comment: Mild elevation in immature granulocytes is non specific and   can be seen in a variety of conditions including stress response,   acute inflammation, trauma and pregnancy. Correlation with other   laboratory and clinical findings is essential.                Immature Granulocytes   0.4             INR       1.1  Comment: Coumadin Therapy:  2.0 - 3.0 for INR for all indicators except mechanical heart valves  and antiphospholipid syndromes which should use 2.5 - 3.5.           Lymph #   4.3             Lymph %   42.5             MCH   30.1             MCHC   33.3             MCV   90             Mono #   0.6             Mono %   6.2             MPV   9.6             nRBC   0             Platelets   300             Potassium   4.5             Protime       11.6          Acceptable         Yes       RBC   5.45             RDW   13.3             SARS-CoV-2 RNA, Amplification, Qual         Negative       Sodium   140             Troponin I 3.050  Comment: The reference interval for Troponin I represents the 99th percentile   cutoff   for our facility and is consistent with 3rd generation assay   performance.       3.307  Comment: The reference interval for Troponin I represents the 99th percentile   cutoff   for our facility and is consistent with 3rd generation assay   performance.             WBC   10.00                                    Significant Imaging: EKG:

## 2022-04-03 NOTE — H&P
[facility]  History & Physical    Patient Name: Philipp Gonzales  MRN: [id number]  Patient Class: IP- Inpatient  Admission Date: 4/2/2022  Attending Physician: Neeraj Armendariz, *   Primary Care Provider: Luana Hall NP         Patient information was obtained from patient and ER records.     Subjective:     Principal Problem:ACS (acute coronary syndrome)    Chief Complaint:   Chief Complaint   Patient presents with    Chest Pain     Mid sternal chest pain that radiates to KRISTOPHER shoulders, +Nausea, +SOB. Symptoms have been intermittent x1 month.        HPI: Mr. Gonzales is a 42 yo male with a PMHx of anxiety, HLD, HTN, and tobacco use who presented to [facility] ED in [facility] with c/o substernal chest pain that has been intermittent for the past 1 month, but worsened today. Pain usually worsens with exertion but resolves with rest, however, today the pain remained even with rest. started a few hours PTA. Pain is aching in nature, rated 9/10, and radiates into bilateral shoulders. Associated SOB and anxiety. Denies any N/V, diaphoresis, neck or jaw pain, numbness/tingling, palpitations, cough, ABD pain, back pain, HA, lightheadedness, dizziness, syncope, weakness, fever or chills. He received 325 mg ASA and nitropaste in the ED, which resolved his chest pain. Initial work-up showed: Troponin 1.976, BNP 94, and unremarkable CXR. EKG showed new LBBB. EKG reviewed by Cardiology and no STEMI per Dr. Turk. UFH and 300 mg Plavix load given in the ED. Patient was transferred to [facility] and admitted under Hospital Medicine services.       Past Medical History:   Diagnosis Date    Anxiety     High cholesterol     Hyperlipidemia     Insomnia     Legally blind     Macular degeneration        Past Surgical History:   Procedure Laterality Date    HIP SURGERY      as child left       Review of patient's allergies indicates:  No Known Allergies    No current facility-administered  medications on file prior to encounter.     Current Outpatient Medications on File Prior to Encounter   Medication Sig    amoxicillin-clavulanate 875-125mg (AUGMENTIN) 875-125 mg per tablet Take 1 tablet by mouth every 12 (twelve) hours. for 7 days    EScitalopram oxalate (LEXAPRO) 20 MG tablet Take 1 tablet (20 mg total) by mouth once daily.    fenofibrate (TRICOR) 54 MG tablet Take 1 tablet (54 mg total) by mouth once daily.    pantoprazole (PROTONIX) 40 MG tablet Take 1 tablet (40 mg total) by mouth once daily.    traZODone (DESYREL) 100 MG tablet Take 1 tablet (100 mg total) by mouth nightly.     Family History       Problem Relation (Age of Onset)    Diabetes Father    Heart attacks under age 50 Paternal Grandmother    Hyperlipidemia Father    Macular degeneration Mother, Maternal Grandfather          Tobacco Use    Smoking status: Current Every Day Smoker     Packs/day: 1.00     Types: Cigarettes    Smokeless tobacco: Never Used   Substance and Sexual Activity    Alcohol use: Yes     Comment: rarely    Drug use: No    Sexual activity: Yes     Partners: Female     Review of Systems   Constitutional:  Negative for chills, diaphoresis, fatigue and fever.   HENT:  Negative for congestion, postnasal drip, rhinorrhea, sinus pressure and sore throat.    Respiratory:  Positive for shortness of breath. Negative for cough and wheezing.    Cardiovascular:  Positive for chest pain. Negative for palpitations and leg swelling.   Gastrointestinal:  Negative for abdominal pain, diarrhea, nausea and vomiting.   Genitourinary:  Negative for dysuria and hematuria.   Musculoskeletal:  Negative for arthralgias, back pain and myalgias.   Skin:  Negative for pallor and rash.   Neurological:  Negative for dizziness, syncope, weakness, light-headedness, numbness and headaches.   Psychiatric/Behavioral:  The patient is nervous/anxious.    All other systems reviewed and are negative.  Objective:     Vital Signs (Most  Recent):  Temp: 98.3 °F (36.8 °C) (04/02/22 1833)  Pulse: 96 (04/02/22 2300)  Resp: 20 (04/02/22 2300)  BP: 99/63 (04/02/22 2300)  SpO2: 97 % (04/02/22 2300) Vital Signs (24h Range):  Temp:  [98.3 °F (36.8 °C)] 98.3 °F (36.8 °C)  Pulse:  [] 96  Resp:  [18-30] 20  SpO2:  [93 %-97 %] 97 %  BP: ()/(55-85) 99/63     Weight: 104.9 kg (231 lb 4.2 oz)  Body mass index is 36.22 kg/m².    Physical Exam  Vitals and nursing note reviewed.   Constitutional:       General: He is awake. He is not in acute distress.     Appearance: Normal appearance. He is well-developed. He is not diaphoretic.   HENT:      Head: Normocephalic and atraumatic.   Eyes:      Conjunctiva/sclera: Conjunctivae normal.   Cardiovascular:      Rate and Rhythm: Normal rate and regular rhythm. No extrasystoles are present.     Heart sounds: S1 normal and S2 normal. No murmur heard.  Pulmonary:      Effort: Pulmonary effort is normal. No tachypnea.      Breath sounds: Normal breath sounds and air entry. No wheezing, rhonchi or rales.   Abdominal:      General: Bowel sounds are normal. There is no distension.      Palpations: Abdomen is soft.      Tenderness: There is no abdominal tenderness.   Musculoskeletal:         General: No tenderness or deformity. Normal range of motion.      Cervical back: Normal range of motion and neck supple.      Right lower leg: No edema.      Left lower leg: No edema.   Skin:     General: Skin is warm and dry.      Capillary Refill: Capillary refill takes less than 2 seconds.      Findings: No erythema or rash.   Neurological:      General: No focal deficit present.      Mental Status: He is alert and oriented to person, place, and time.   Psychiatric:         Mood and Affect: Mood and affect normal.         Behavior: Behavior normal. Behavior is cooperative.           Significant Labs:  Results for orders placed or performed during the hospital encounter of 04/02/22   CBC auto differential   Result Value Ref Range     WBC 9.52 3.90 - 12.70 K/uL    RBC 5.35 4.60 - 6.20 M/uL    Hemoglobin 16.4 14.0 - 18.0 g/dL    Hematocrit 47.8 40.0 - 54.0 %    MCV 89 82 - 98 fL    MCH 30.7 27.0 - 31.0 pg    MCHC 34.3 32.0 - 36.0 g/dL    RDW 13.4 11.5 - 14.5 %    Platelets 282 150 - 450 K/uL    MPV 9.7 9.2 - 12.9 fL    Immature Granulocytes 0.3 0.0 - 0.5 %    Gran # (ANC) 4.7 1.8 - 7.7 K/uL    Immature Grans (Abs) 0.03 0.00 - 0.04 K/uL    Lymph # 3.7 1.0 - 4.8 K/uL    Mono # 0.8 0.3 - 1.0 K/uL    Eos # 0.3 0.0 - 0.5 K/uL    Baso # 0.08 0.00 - 0.20 K/uL    nRBC 0 0 /100 WBC    Gran % 49.3 38.0 - 73.0 %    Lymph % 38.8 18.0 - 48.0 %    Mono % 8.0 4.0 - 15.0 %    Eosinophil % 2.8 0.0 - 8.0 %    Basophil % 0.8 0.0 - 1.9 %    Differential Method Automated    Comprehensive metabolic panel   Result Value Ref Range    Sodium 140 136 - 145 mmol/L    Potassium 4.2 3.5 - 5.1 mmol/L    Chloride 108 95 - 110 mmol/L    CO2 21 (L) 23 - 29 mmol/L    Glucose 99 70 - 110 mg/dL    BUN 17 6 - 20 mg/dL    Creatinine 0.8 0.5 - 1.4 mg/dL    Calcium 9.1 8.7 - 10.5 mg/dL    Total Protein 7.2 6.0 - 8.4 g/dL    Albumin 3.7 3.5 - 5.2 g/dL    Total Bilirubin 0.4 0.1 - 1.0 mg/dL    Alkaline Phosphatase 60 55 - 135 U/L    AST 36 10 - 40 U/L    ALT 30 10 - 44 U/L    Anion Gap 11 8 - 16 mmol/L    eGFR if African American >60.0 >60 mL/min/1.73 m^2    eGFR if non African American >60.0 >60 mL/min/1.73 m^2   Troponin I #1   Result Value Ref Range    Troponin I 1.976 (H) 0.000 - 0.026 ng/mL   BNP   Result Value Ref Range    BNP 94 0 - 99 pg/mL   APTT   Result Value Ref Range    aPTT 29.6 21.0 - 32.0 sec   Protime-INR   Result Value Ref Range    Prothrombin Time 11.6 9.0 - 12.5 sec    INR 1.1 0.8 - 1.2   POCT COVID-19 Rapid Screening   Result Value Ref Range    POC Rapid COVID Negative Negative     Acceptable Yes       All pertinent labs within the past 24 hours have been reviewed.    Significant Imaging:  Imaging Results              X-Ray Chest AP Portable (Final  result)  Result time 04/02/22 19:23:45      Final result by Javan Olvera MD (04/02/22 19:23:45)                   Impression:     No acute findings.    Finalized on: 4/2/2022 7:23 PM By:  Javan Olvera MD  [facility]# [id number]      2022-04-02 19:25:57.127    [facility]               Narrative:    EXAM: XR CHEST AP PORTABLE    CLINICAL HISTORY: Chest pain    FINDINGS:  The heart is normal in size.  Lungs are clear of acute infiltrate or consolidation.                                       I have reviewed all pertinent imaging results/findings within the past 24 hours.      EKG: (personally reviewed)  Sinus tachycardia, LBBB (new). No STEMI per Dr. Turk (Cardiology).            Assessment/Plan:     * ACS (acute coronary syndrome)  - Troponin 1.976. EKG with new LBBB (no STEMI per Dr. Turk with Cardiology). CP free on admission.    - Continue UFH per protocol.  - Plavix load given in the ED. Start ASA, Plavix, BB, and high intensity statin.  - SL NTG PRN.  - Trend serial cardiac enzymes and EKG.  - Check FLP.  - Will obtain 2D echo.  - Cardiology consult. Will keep NPO for probable LHC.     Primary hypertension  - Diet controlled at home. Adding PO Lopressor as above. Follow BP trends and optimize as needed.     Tobacco use  - Assistance with smoking cessation was offered, including:  [x]  Medications  [x]  Counseling  [x]  Printed Information on Smoking Cessation  []  Referral to a Smoking Cessation Program  - Patient was counseled regarding smoking for 3-10 minutes.    Hyperlipidemia  - Continue home Tricor. Adding high intensity statin as above.  - FLP in AM.       VTE Risk Mitigation (From admission, onward)         Ordered     IP VTE HIGH RISK PATIENT  Once         04/02/22 2122     Place sequential compression device  Until discontinued         04/02/22 2122     IP VTE HIGH RISK PATIENT  Once         04/02/22 2122     heparin 25,000 units in dextrose 5% (100 units/ml) IV bolus from bag - ADDITIONAL PRN BOLUS - 60 units/kg  (max bolus 4000 units)  As needed (PRN)        Question:  Heparin Infusion Adjustment (DO NOT MODIFY ANSWER)  Answer:  [email or web address]\epic\Images\Pharmacy\HeparinInfusions\heparin LOW INTENSITY nomogram for [facility] [id number]    04/02/22 1952     heparin 25,000 units in dextrose 5% (100 units/ml) IV bolus from bag - ADDITIONAL PRN BOLUS - 30 units/kg (max bolus 4000 units)  As needed (PRN)        Question:  Heparin Infusion Adjustment (DO NOT MODIFY ANSWER)  Answer:  [email or web address]\epic\Images\Pharmacy\HeparinInfusions\heparin LOW INTENSITY nomogram for [facility] [id number]    04/02/22 1952     heparin 25,000 units in dextrose 5% 250 mL (100 units/mL) infusion LOW INTENSITY nomogram - [facility]  Continuous        Question Answer Comment   Heparin Infusion Adjustment (DO NOT MODIFY ANSWER) [email or web address]\epic\Images\Pharmacy\HeparinInfusions\heparin LOW INTENSITY nomogram for [facility] [id number]    Begin at (in units/kg/hr) 12        04/02/22 1952                   Ivana Rey NP  Department of Hospital Medicine   [facility] - Telemetry ([facility])

## 2022-04-03 NOTE — ASSESSMENT & PLAN NOTE
- Troponin 1.976. EKG with new LBBB (no STEMI per Dr. Turk with Cardiology). CP free on admission.    - Continue UFH per protocol.  - Plavix load given in the ED. Start ASA, Plavix, BB, and high intensity statin.  - SL NTG PRN.  - Trend serial cardiac enzymes and EKG.  - Check FLP.  - Will obtain 2D echo.  - Cardiology consult. Will keep NPO for probable LHC.

## 2022-04-03 NOTE — CONSULTS
[facility] ([facility])  Cardiology  Consult Note    Patient Name: Philipp Gonzales  MRN: [id number]  Admission Date: 4/2/2022  Hospital Length of Stay: 1 days  Code Status: Full Code   Attending Provider: Neeraj Armendariz, *   Consulting Provider: Drew Turk MD  Primary Care Physician: Luana Hall NP  Principal Problem:ACS (acute coronary syndrome)    Patient information was obtained from patient and ER records.     Inpatient consult to Cardiology  Consult performed by: Drew Turk MD  Consult ordered by: Ivana Rey NP        Subjective:     HPI:   42 yo male, cardiology consult for NSTEMI/ACS.  PMH legal blindness, anxiety, HLD, HTN, and tobacco use   C/o substernal chest pain that has been intermittent for the past 1 month, but worsened today. Pain usually worsens with exertion but resolves with rest, however, today the pain remained even with rest. started a few hours PTA. Pain is aching in nature, rated 9/10, and radiates into bilateral shoulders. Associated SOB and anxiety. In ER troponin up to 3.0, Cr 0.9 HGB 16  Ekg NSR, new LBBB compared to prior one in 2021  Started UFH and 300 mg Plavix load given in the ED. Patient was transferred to [facility] for NSTEMI               Past Medical History:   Diagnosis Date    Anxiety     High cholesterol     Hyperlipidemia     Insomnia     Legally blind     Macular degeneration        Past Surgical History:   Procedure Laterality Date    HIP SURGERY      as child left       Review of patient's allergies indicates:  No Known Allergies    No current facility-administered medications on file prior to encounter.     Current Outpatient Medications on File Prior to Encounter   Medication Sig    amoxicillin-clavulanate 875-125mg (AUGMENTIN) 875-125 mg per tablet Take 1 tablet by mouth every 12 (twelve) hours. for 7 days    EScitalopram oxalate (LEXAPRO) 20 MG tablet Take 1 tablet (20 mg total) by mouth once daily.    fenofibrate (TRICOR) 54 MG  tablet Take 1 tablet (54 mg total) by mouth once daily.    pantoprazole (PROTONIX) 40 MG tablet Take 1 tablet (40 mg total) by mouth once daily.    traZODone (DESYREL) 100 MG tablet Take 1 tablet (100 mg total) by mouth nightly.     Family History       Problem Relation (Age of Onset)    Diabetes Father    Heart attacks under age 50 Paternal Grandmother    Hyperlipidemia Father    Macular degeneration Mother, Maternal Grandfather          Tobacco Use    Smoking status: Current Every Day Smoker     Packs/day: 1.00     Types: Cigarettes    Smokeless tobacco: Never Used   Substance and Sexual Activity    Alcohol use: Yes     Comment: rarely    Drug use: No    Sexual activity: Yes     Partners: Female     Review of Systems   Constitutional: Negative for decreased appetite, diaphoresis, fever, malaise/fatigue and night sweats.   HENT:  Negative for nosebleeds.    Eyes:  Positive for vision loss in left eye and vision loss in right eye. Negative for blurred vision and double vision.   Cardiovascular:  Positive for chest pain and dyspnea on exertion. Negative for claudication, irregular heartbeat, leg swelling, near-syncope, orthopnea, palpitations, paroxysmal nocturnal dyspnea and syncope.   Respiratory:  Negative for cough, shortness of breath, sleep disturbances due to breathing, snoring, sputum production and wheezing.    Endocrine: Negative for cold intolerance and polyuria.   Hematologic/Lymphatic: Does not bruise/bleed easily.   Skin:  Negative for rash.   Musculoskeletal:  Positive for arthritis, back pain and joint pain. Negative for falls, joint swelling and neck pain.   Gastrointestinal:  Negative for abdominal pain, heartburn, nausea and vomiting.   Genitourinary:  Negative for dysuria, frequency and hematuria.   Neurological:  Negative for difficulty with concentration, dizziness, focal weakness, headaches, light-headedness, numbness, seizures and weakness.   Psychiatric/Behavioral:  Negative for  depression, memory loss and substance abuse. The patient does not have insomnia.    Allergic/Immunologic: Negative for HIV exposure and hives.   Objective:     Vital Signs (Most Recent):  Temp: 97.8 °F (36.6 °C) (04/03/22 0819)  Pulse: 77 (04/03/22 0838)  Resp: 18 (04/03/22 0838)  BP: 106/69 (04/03/22 0819)  SpO2: (!) 92 % (04/03/22 0838)   Vital Signs (24h Range):  Temp:  [97.8 °F (36.6 °C)-98.5 °F (36.9 °C)] 97.8 °F (36.6 °C)  Pulse:  [] 77  Resp:  [18-30] 18  SpO2:  [91 %-97 %] 92 %  BP: ()/(54-85) 106/69     Weight: 102.1 kg (225 lb 1.4 oz)  Body mass index is 35.25 kg/m².    SpO2: (!) 92 %  O2 Device (Oxygen Therapy): room air    No intake or output data in the 24 hours ending 04/03/22 0911    Lines/Drains/Airways       Peripheral Intravenous Line  Duration                  Peripheral IV - Single Lumen 04/02/22 1845 20 G Left Antecubital <1 day         Peripheral IV - Single Lumen 04/02/22 2013 20 G Right Hand <1 day                    Physical Exam  HENT:      Head: Normocephalic.   Eyes:      Pupils: Pupils are equal, round, and reactive to light.   Neck:      Thyroid: No thyromegaly.      Vascular: Normal carotid pulses. No carotid bruit or JVD.   Cardiovascular:      Rate and Rhythm: Normal rate and regular rhythm. No extrasystoles are present.     Chest Wall: PMI is not displaced.      Pulses: Normal pulses.      Heart sounds: Normal heart sounds. No murmur heard.    No gallop. No S3 sounds.   Pulmonary:      Effort: No respiratory distress.      Breath sounds: Normal breath sounds. No stridor.   Abdominal:      General: Bowel sounds are normal.      Palpations: Abdomen is soft.      Tenderness: There is no abdominal tenderness. There is no rebound.   Musculoskeletal:         General: Normal range of motion.   Skin:     Findings: No rash.   Neurological:      Mental Status: He is alert and oriented to person, place, and time.   Psychiatric:         Behavior: Behavior normal.       Significant  Labs:   Recent Lab Results  (Last 5 results in the past 24 hours)        04/03/22  0626   04/03/22  0245   04/03/22  0128   04/02/22  1958   04/02/22  1925        Anion Gap   12             aPTT   31.1  Comment: aPTT therapeutic range = 39-69 seconds     29.6  Comment: aPTT therapeutic range = 39-69 seconds         Baso #   0.08             Basophil %   0.8             BUN   19             Calcium   9.4             Chloride   105             CO2   23             Creatinine   0.9             Differential Method   Automated             eGFR if    >60             eGFR if non    >60  Comment: Calculation used to obtain the estimated glomerular filtration  rate (eGFR) is the CKD-EPI equation.                Eos #   0.3             Eosinophil %   3.1             Glucose   97             Gran # (ANC)   4.7             Gran %   47.0             Hematocrit   49.2             Hemoglobin   16.4             Immature Grans (Abs)   0.04  Comment: Mild elevation in immature granulocytes is non specific and   can be seen in a variety of conditions including stress response,   acute inflammation, trauma and pregnancy. Correlation with other   laboratory and clinical findings is essential.               Immature Granulocytes   0.4             INR       1.1  Comment: Coumadin Therapy:  2.0 - 3.0 for INR for all indicators except mechanical heart valves  and antiphospholipid syndromes which should use 2.5 - 3.5.           Lymph #   4.3             Lymph %   42.5             MCH   30.1             MCHC   33.3             MCV   90             Mono #   0.6             Mono %   6.2             MPV   9.6             nRBC   0             Platelets   300             Potassium   4.5             Protime       11.6          Acceptable         Yes       RBC   5.45             RDW   13.3             SARS-CoV-2 RNA, Amplification, Qual         Negative       Sodium   140             Troponin I  3.050  Comment: The reference interval for Troponin I represents the 99th percentile   cutoff   for our facility and is consistent with 3rd generation assay   performance.       3.307  Comment: The reference interval for Troponin I represents the 99th percentile   cutoff   for our facility and is consistent with 3rd generation assay   performance.             WBC   10.00                                    Significant Imaging: EKG:      Assessment and Plan:     NSTEMI (non-ST elevated myocardial infarction)  NSTEMI/ACS  LBBB  HTN  Smoker  Legal blindness    --keep NPO and heparin gtt  --Arrange C today  --continue ASA plavix statin metoprolol  I have explained the risks, benefits, and alternatives of the procedure in detail with patient and family. The patient voices understanding and all questions have been addressed.  The patient agrees to proceed as planned.    Primary hypertension  See above note    Tobacco use  Smoker cessation    Hyperlipidemia  See above note        VTE Risk Mitigation (From admission, onward)         Ordered     IP VTE HIGH RISK PATIENT  Once         04/02/22 2122     Place sequential compression device  Until discontinued         04/02/22 2122     IP VTE HIGH RISK PATIENT  Once         04/02/22 2122     heparin 25,000 units in dextrose 5% (100 units/ml) IV bolus from bag - ADDITIONAL PRN BOLUS - 60 units/kg (max bolus 4000 units)  As needed (PRN)        Question:  Heparin Infusion Adjustment (DO NOT MODIFY ANSWER)  Answer:  \\[email or web address]\epic\Images\Pharmacy\HeparinInfusions\heparin LOW INTENSITY nomogram for [facility] [id number].pdf    04/02/22 1952     heparin 25,000 units in dextrose 5% (100 units/ml) IV bolus from bag - ADDITIONAL PRN BOLUS - 30 units/kg (max bolus 4000 units)  As needed (PRN)        Question:  Heparin Infusion Adjustment (DO NOT MODIFY ANSWER)  Answer:  \\[email or web address]\epic\Images\Pharmacy\HeparinInfusions\heparin LOW INTENSITY nomogram for [facility] [id number].pdf    04/02/22 1952      heparin 25,000 units in dextrose 5% 250 mL (100 units/mL) infusion LOW INTENSITY nomogram - [facility]  Continuous        Question Answer Comment   Heparin Infusion Adjustment (DO NOT MODIFY ANSWER) [email or web address]\epic\Images\Pharmacy\HeparinInfusions\heparin LOW INTENSITY nomogram for [facility] [id number].pdf    Begin at (in units/kg/hr) 12        04/02/22 1952                Thank you for your consult. I will follow-up with patient. Please contact us if you have any additional questions.    Drew Turk MD  Cardiology   [facility] - Telemetry ([facility])

## 2022-04-03 NOTE — HPI
Mr. Gonzales is a 42 yo male with a PMHx of anxiety, HLD, HTN, and tobacco use who presented to [facility] ED in [facility] with c/o substernal chest pain that has been intermittent for the past 1 month, but worsened today. Pain usually worsens with exertion but resolves with rest, however, today the pain remained even with rest. started a few hours PTA. Pain is aching in nature, rated 9/10, and radiates into bilateral shoulders. Associated SOB and anxiety. Denies any N/V, diaphoresis, neck or jaw pain, numbness/tingling, palpitations, cough, ABD pain, back pain, HA, lightheadedness, dizziness, syncope, weakness, fever or chills. He received 325 mg ASA and nitropaste in the ED, which resolved his chest pain. Initial work-up showed: Troponin 1.976, BNP 94, and unremarkable CXR. EKG showed new LBBB. EKG reviewed by Cardiology and no STEMI per Dr. Turk. UFH and 300 mg Plavix load given in the ED. Patient was transferred to [facility] and admitted under Hospital Medicine services.

## 2022-04-03 NOTE — ASSESSMENT & PLAN NOTE
- Diet controlled at home. Adding PO Lopressor as above. Follow BP trends and optimize as needed.

## 2022-04-03 NOTE — PROGRESS NOTES
Returned from cath lab via bed. AAOX4, VSS, denies any c/o pain or discomfort at this time. Lungs reamin CTA, Telemetry resumed NSR noted, rate 84. Right groin puncture site soft to touch, no hematoma or bruising noted. Neurovascular check to BLE WNL. Patient educated on post cath orders, verbalizes understanding. Bedrest and laying flat continued until 1600. Will monitor.

## 2022-04-03 NOTE — HPI
42 yo male, cardiology consult for NSTEMI/ACS.  PMH legal blindness, anxiety, HLD, HTN, and tobacco use   C/o substernal chest pain that has been intermittent for the past 1 month, but worsened today. Pain usually worsens with exertion but resolves with rest, however, today the pain remained even with rest. started a few hours PTA. Pain is aching in nature, rated 9/10, and radiates into bilateral shoulders. Associated SOB and anxiety. In ER troponin up to 3.0, Cr 0.9 HGB 16  Ekg NSR, new LBBB compared to prior one in 2021  Started UFH and 300 mg Plavix load given in the ED. Patient was transferred to [facility] for NSTEMI

## 2022-04-03 NOTE — PLAN OF CARE
42 y/o WM admitted with a dx of NSTEMI. Started on heparin drip , asa , statin  And BB . Cardiology consulted . S/P [facility]   PCI x 2 . Tx reviewed . Cont current tx

## 2022-04-03 NOTE — ASSESSMENT & PLAN NOTE
NSTEMI/ACS  LBBB  HTN  Smoker  Legal blindness    --keep NPO and heparin gtt  --Arrange LHC today  --continue ASA plavix statin metoprolol  I have explained the risks, benefits, and alternatives of the procedure in detail with patient and family. The patient voices understanding and all questions have been addressed.  The patient agrees to proceed as planned.

## 2022-04-03 NOTE — BRIEF OP NOTE
<[facility] - Telemetry ([facility])  Surgery Department  Operative Note    SUMMARY     Date of Procedure: 4/3/2022     Procedure: Procedure(s) (LRB):  CATHETERIZATION, HEART, LEFT (Left)     Surgeon(s) and Role:     * Eliud Cristina MD - Primary    Assisting Surgeon: None    Pre-Operative Diagnosis: NSTEMI (non-ST elevated myocardial infarction) [I21.4]    Post-Operative Diagnosis: Post-Op Diagnosis Codes:     * NSTEMI (non-ST elevated myocardial infarction) [I21.4]    Anesthesia: RN IV Sedation    Technical Procedures Used: [facility]    Description of the Findings of the Procedure: [facility], DX: NSTEMI, FINDINGS:P lad 70-90%, dRCA 70%  PCI of lad with lauren to 0%, PCI of distal rca with LAUREN to 0%  EF=40%    Significant Surgical Tasks Conducted by the Assistant(s), if Applicable: NONE    Complications: No    Estimated Blood Loss (EBL): < 50 cc           Implants: * No implants in log *    Specimens:   Specimen (24h ago, onward)            None                  Condition: Good    Disposition: PACU - hemodynamically stable.    Attestation: I was present and scrubbed for the entire procedure.

## 2022-04-04 VITALS
SYSTOLIC BLOOD PRESSURE: 113 MMHG | WEIGHT: 225 LBS | RESPIRATION RATE: 18 BRPM | HEART RATE: 75 BPM | DIASTOLIC BLOOD PRESSURE: 77 MMHG | HEIGHT: 67 IN | OXYGEN SATURATION: 96 % | BODY MASS INDEX: 35.31 KG/M2 | TEMPERATURE: 99 F

## 2022-04-04 DIAGNOSIS — Z72.0 TOBACCO ABUSE: Primary | ICD-10-CM

## 2022-04-04 LAB
ANION GAP SERPL CALC-SCNC: 13 MMOL/L (ref 8–16)
BASOPHILS # BLD AUTO: 0.05 K/UL (ref 0–0.2)
BASOPHILS NFR BLD: 0.6 % (ref 0–1.9)
BUN SERPL-MCNC: 15 MG/DL (ref 6–20)
CALCIUM SERPL-MCNC: 9.9 MG/DL (ref 8.7–10.5)
CATH EF QUANTITATIVE: 40 %
CHLORIDE SERPL-SCNC: 104 MMOL/L (ref 95–110)
CO2 SERPL-SCNC: 21 MMOL/L (ref 23–29)
CREAT SERPL-MCNC: 0.8 MG/DL (ref 0.5–1.4)
DIFFERENTIAL METHOD: ABNORMAL
EOSINOPHIL # BLD AUTO: 0.2 K/UL (ref 0–0.5)
EOSINOPHIL NFR BLD: 2.3 % (ref 0–8)
ERYTHROCYTE [DISTWIDTH] IN BLOOD BY AUTOMATED COUNT: 13 % (ref 11.5–14.5)
EST. GFR  (AFRICAN AMERICAN): >60 ML/MIN/1.73 M^2
EST. GFR  (NON AFRICAN AMERICAN): >60 ML/MIN/1.73 M^2
GLUCOSE SERPL-MCNC: 109 MG/DL (ref 70–110)
HCT VFR BLD AUTO: 42.1 % (ref 40–54)
HGB BLD-MCNC: 14.5 G/DL (ref 14–18)
IMM GRANULOCYTES # BLD AUTO: 0.02 K/UL (ref 0–0.04)
IMM GRANULOCYTES NFR BLD AUTO: 0.2 % (ref 0–0.5)
LYMPHOCYTES # BLD AUTO: 2.6 K/UL (ref 1–4.8)
LYMPHOCYTES NFR BLD: 31.5 % (ref 18–48)
MCH RBC QN AUTO: 31.2 PG (ref 27–31)
MCHC RBC AUTO-ENTMCNC: 34.4 G/DL (ref 32–36)
MCV RBC AUTO: 91 FL (ref 82–98)
MONOCYTES # BLD AUTO: 0.6 K/UL (ref 0.3–1)
MONOCYTES NFR BLD: 7.6 % (ref 4–15)
NEUTROPHILS # BLD AUTO: 4.8 K/UL (ref 1.8–7.7)
NEUTROPHILS NFR BLD: 57.8 % (ref 38–73)
NRBC BLD-RTO: 0 /100 WBC
PLATELET # BLD AUTO: 231 K/UL (ref 150–450)
PMV BLD AUTO: 9.6 FL (ref 9.2–12.9)
POTASSIUM SERPL-SCNC: 4.1 MMOL/L (ref 3.5–5.1)
RBC # BLD AUTO: 4.65 M/UL (ref 4.6–6.2)
SODIUM SERPL-SCNC: 138 MMOL/L (ref 136–145)
WBC # BLD AUTO: 8.26 K/UL (ref 3.9–12.7)

## 2022-04-04 PROCEDURE — 25000003 PHARM REV CODE 250: Performed by: INTERNAL MEDICINE

## 2022-04-04 PROCEDURE — 25000003 PHARM REV CODE 250: Performed by: NURSE PRACTITIONER

## 2022-04-04 PROCEDURE — 36415 COLL VENOUS BLD VENIPUNCTURE: CPT | Performed by: INTERNAL MEDICINE

## 2022-04-04 PROCEDURE — 99233 SBSQ HOSP IP/OBS HIGH 50: CPT | Mod: ,,, | Performed by: INTERNAL MEDICINE

## 2022-04-04 PROCEDURE — 94761 N-INVAS EAR/PLS OXIMETRY MLT: CPT

## 2022-04-04 PROCEDURE — 25000242 PHARM REV CODE 250 ALT 637 W/ HCPCS: Performed by: NURSE PRACTITIONER

## 2022-04-04 PROCEDURE — 36415 COLL VENOUS BLD VENIPUNCTURE: CPT | Performed by: PHYSICIAN ASSISTANT

## 2022-04-04 PROCEDURE — 85025 COMPLETE CBC W/AUTO DIFF WBC: CPT | Performed by: INTERNAL MEDICINE

## 2022-04-04 PROCEDURE — 99233 PR SUBSEQUENT HOSPITAL CARE,LEVL III: ICD-10-PCS | Mod: ,,, | Performed by: INTERNAL MEDICINE

## 2022-04-04 PROCEDURE — 80048 BASIC METABOLIC PNL TOTAL CA: CPT | Performed by: PHYSICIAN ASSISTANT

## 2022-04-04 PROCEDURE — S4991 NICOTINE PATCH NONLEGEND: HCPCS | Performed by: NURSE PRACTITIONER

## 2022-04-04 RX ORDER — LISINOPRIL 2.5 MG/1
2.5 TABLET ORAL DAILY
Qty: 90 TABLET | Refills: 1 | Status: SHIPPED | OUTPATIENT
Start: 2022-04-05 | End: 2022-05-04 | Stop reason: SDUPTHER

## 2022-04-04 RX ORDER — METOPROLOL SUCCINATE 50 MG/1
50 TABLET, EXTENDED RELEASE ORAL DAILY
Qty: 90 TABLET | Refills: 1 | Status: SHIPPED | OUTPATIENT
Start: 2022-04-04 | End: 2022-05-04 | Stop reason: SDUPTHER

## 2022-04-04 RX ORDER — ATORVASTATIN CALCIUM 80 MG/1
80 TABLET, FILM COATED ORAL DAILY
Qty: 90 TABLET | Refills: 1 | Status: SHIPPED | OUTPATIENT
Start: 2022-04-05 | End: 2022-05-04 | Stop reason: SDUPTHER

## 2022-04-04 RX ORDER — CLOPIDOGREL BISULFATE 75 MG/1
75 TABLET ORAL DAILY
Qty: 30 TABLET | Refills: 2 | Status: SHIPPED | OUTPATIENT
Start: 2022-04-05 | End: 2022-05-04 | Stop reason: SDUPTHER

## 2022-04-04 RX ORDER — LISINOPRIL 2.5 MG/1
2.5 TABLET ORAL DAILY
Status: DISCONTINUED | OUTPATIENT
Start: 2022-04-04 | End: 2022-04-04 | Stop reason: HOSPADM

## 2022-04-04 RX ORDER — ASPIRIN 81 MG/1
81 TABLET ORAL DAILY
Qty: 90 TABLET | Refills: 3 | Status: SHIPPED | OUTPATIENT
Start: 2022-04-05 | End: 2023-02-13

## 2022-04-04 RX ADMIN — CLOPIDOGREL 75 MG: 75 TABLET, FILM COATED ORAL at 08:04

## 2022-04-04 RX ADMIN — LISINOPRIL 2.5 MG: 2.5 TABLET ORAL at 08:04

## 2022-04-04 RX ADMIN — NICOTINE 1 PATCH: 21 PATCH, EXTENDED RELEASE TRANSDERMAL at 08:04

## 2022-04-04 RX ADMIN — ASPIRIN 81 MG: 81 TABLET ORAL at 08:04

## 2022-04-04 RX ADMIN — FENOFIBRATE 160 MG: 160 TABLET ORAL at 08:04

## 2022-04-04 RX ADMIN — PANTOPRAZOLE SODIUM 40 MG: 40 TABLET, DELAYED RELEASE ORAL at 08:04

## 2022-04-04 RX ADMIN — METOPROLOL TARTRATE 25 MG: 25 TABLET, FILM COATED ORAL at 08:04

## 2022-04-04 RX ADMIN — ESCITALOPRAM OXALATE 20 MG: 10 TABLET ORAL at 08:04

## 2022-04-04 RX ADMIN — ATORVASTATIN CALCIUM 80 MG: 40 TABLET, FILM COATED ORAL at 08:04

## 2022-04-04 NOTE — PLAN OF CARE
[facility] - Telemetry (Hospital)  Discharge Final Note    Primary Care Provider: Luana Hall NP    Expected Discharge Date: 4/4/2022    Final Discharge Note (most recent)     Final Note - 04/04/22 1146        Final Note    Assessment Type Final Discharge Note     What phone number can be called within the next 1-3 days to see how you are doing after discharge? --   [phone]    Hospital Resources/Appts/Education Provided Appointments scheduled and added to AVS        Post-Acute Status    Discharge Delays None known at this time               Pt to discharge home. Appointments scheduled and added to AVS.   No additional CM needs for discharge.     Important Message from Medicare             Contact Info     Luana Hall NP   Specialty: Family Medicine   Relationship: PCP - General    [address] [place] [id number]   Phone: [phone]       Next Steps: Follow up in 1 week(s)    Drew Turk MD   Specialty: Cardiology, Cardiovascular Disease    [address]  [place] [address]   Phone: [phone]       Next Steps: Follow up in 2 week(s)

## 2022-04-04 NOTE — DISCHARGE SUMMARY
[facility] - Tele[facility] ([facility])  [facility] Medicine  Discharge Summary      Patient Name: Philipp Gonzales  MRN: [id number]  Patient Class: IP- Inpatient  Admission Date: 4/2/2022  Hospital Length of Stay: 2 days  Discharge Date and Time:  04/04/2022 10:56 AM  Attending Physician: Neeraj Armendariz, *   Discharging Provider: Neeraj Armendariz MD  Primary Care Provider: Luana Hall NP      HPI:   Mr. Gonzales is a 40 yo male with a PMHx of anxiety, HLD, HTN, and tobacco use who presented to [facility] ED in [facility] with c/o substernal chest pain that has been intermittent for the past 1 month, but worsened today. Pain usually worsens with exertion but resolves with rest, however, today the pain remained even with rest. started a few hours PTA. Pain is aching in nature, rated 9/10, and radiates into bilateral shoulders. Associated SOB and anxiety. Denies any N/V, diaphoresis, neck or jaw pain, numbness/tingling, palpitations, cough, ABD pain, back pain, HA, lightheadedness, dizziness, syncope, weakness, fever or chills. He received 325 mg ASA and nitropaste in the ED, which resolved his chest pain. Initial work-up showed: Troponin 1.976, BNP 94, and unremarkable CXR. EKG showed new LBBB. EKG reviewed by Cardiology and no STEMI per Dr. Turk. UFH and 300 mg Plavix load given in the ED. Patient was transferred to [facility] and admitted under Hospital Medicine services.       Procedure(s) (LRB):  CATHETERIZATION, HEART, LEFT (Left)  Percutaneous coronary intervention (N/A)  INSERTION, STENT, CORONARY ARTERY (N/A)      Hospital Course:   42 y/o WM admitted with a dx of NSTEMI. Started on heparin drip , asa , statin  And BB . Cardiology consulted . S/P [facility]   PCI x 2 . Tx reviewed . Cont current tx .  4/4 Pt was seen and examined at bedside . He was determined to be suitable for d/c   He was admitted with a Dx of NSTEMI / S/P [facility] which show :lad 70-90%, dRCA 70%.PCI of lad with lauren to 0%, PCI of distal rca with LAUREN to  0% . Pt was monitor for 24 pot [facility] w/o any problems .  He will be d/c on BB , statin , asa , Plavix  and ACEi . Pt was advised to f/u with his PCP and cardiology in 1 to 2 weeks . Case was D/W cardiology which agree with this plan .        Goals of Care Treatment Preferences:  Code Status: Full Code      Consults:   Consults (From admission, onward)        Status Ordering Provider     Inpatient consult to Cardiology  Once        Provider:  Drew Turk MD    Completed SATISH DOMÍNGUEZ          No new Assessment & Plan notes have been filed under this hospital service since the last note was generated.  Service: Hospital Medicine    Final Active Diagnoses:    Diagnosis Date Noted POA    PRINCIPAL PROBLEM:  ACS (acute coronary syndrome) [I24.9] 04/02/2022 Yes    Primary hypertension [I10] 04/03/2022 Yes     Chronic    NSTEMI (non-ST elevated myocardial infarction) [I21.4] 04/03/2022 Unknown    Tobacco use [Z72.0] 03/03/2020 Yes     Chronic    Hyperlipidemia [E78.5] 05/26/2017 Yes     Chronic      Problems Resolved During this Admission:       Discharged Condition: stable    Disposition: Home or Self Care    Follow Up:   Follow-up Information     Luana Hall NP Follow up in 1 week(s).    Specialty: Family Medicine  Contact information:  [address] [address] [address] [phone]             Drew Turk MD Follow up in 2 week(s).    Specialties: Cardiology, Cardiovascular Disease  Contact information:  [address]  [place] [address] [phone]                       Patient Instructions:      Diet Cardiac     Activity as tolerated       Significant Diagnostic Studies: Labs:   BMP:   Recent Labs   Lab 04/02/22 1846 04/03/22  0245 04/04/22  0909   GLU 99 97 109    140 138   K 4.2 4.5 4.1    105 104   CO2 21* 23 21*   BUN 17 19 15   CREATININE 0.8 0.9 0.8   CALCIUM 9.1 9.4 9.9   , CMP   Recent Labs   Lab 04/02/22  1846 04/03/22  0245 04/04/22  0909    140 138   K 4.2 4.5  4.1    105 104   CO2 21* 23 21*   GLU 99 97 109   BUN 17 19 15   CREATININE 0.8 0.9 0.8   CALCIUM 9.1 9.4 9.9   PROT 7.2  --   --    ALBUMIN 3.7  --   --    BILITOT 0.4  --   --    ALKPHOS 60  --   --    AST 36  --   --    ALT 30  --   --    ANIONGAP 11 12 13   ESTGFRAFRICA >60.0 >60 >60   EGFRNONAA >60.0 >60 >60    and CBC   Recent Labs   Lab 04/02/22  1846 04/03/22  0245 04/04/22  0018   WBC 9.52 10.00 8.26   HGB 16.4 16.4 14.5   HCT 47.8 49.2 42.1    300 231     Microbiology: Blood Culture No results found for: LABBLOO    Pending Diagnostic Studies:     None         Medications:  Reconciled Home Medications:      Medication List      START taking these medications    aspirin 81 MG EC tablet  Commonly known as: ECOTRIN  Take 1 tablet (81 mg total) by mouth once daily.  Start taking on: April 5, 2022     atorvastatin 80 MG tablet  Commonly known as: LIPITOR  Take 1 tablet (80 mg total) by mouth once daily.  Start taking on: April 5, 2022     clopidogreL 75 mg tablet  Commonly known as: PLAVIX  Take 1 tablet (75 mg total) by mouth once daily.  Start taking on: April 5, 2022     lisinopriL 2.5 MG tablet  Commonly known as: PRINIVIL,ZESTRIL  Take 1 tablet (2.5 mg total) by mouth once daily.  Start taking on: April 5, 2022     metoprolol succinate 50 MG 24 hr tablet  Commonly known as: TOPROL-XL  Take 1 tablet (50 mg total) by mouth once daily.        CONTINUE taking these medications    amoxicillin-clavulanate 875-125mg 875-125 mg per tablet  Commonly known as: AUGMENTIN  Take 1 tablet by mouth every 12 (twelve) hours. for 7 days     EScitalopram oxalate 20 MG tablet  Commonly known as: LEXAPRO  Take 1 tablet (20 mg total) by mouth once daily.     fenofibrate 54 MG tablet  Commonly known as: TRICOR  Take 1 tablet (54 mg total) by mouth once daily.     pantoprazole 40 MG tablet  Commonly known as: PROTONIX  Take 1 tablet (40 mg total) by mouth once daily.     traZODone 100 MG tablet  Commonly known as:  DESYREL  Take 1 tablet (100 mg total) by mouth nightly.            Indwelling Lines/Drains at time of discharge:   Lines/Drains/Airways     None                 Time spent on the discharge of patient: 31 minutes         Neeraj Armnedariz MD  Department of Hospital Medicine  [facility] - Telemetry ([facility])

## 2022-04-04 NOTE — SUBJECTIVE & OBJECTIVE
Review of Systems   Constitutional: Negative.   HENT: Negative.     Eyes:  Positive for vision loss in left eye and vision loss in right eye.   Cardiovascular: Negative.    Respiratory: Negative.     Endocrine: Negative.    Hematologic/Lymphatic: Negative.    Skin: Negative.    Musculoskeletal: Negative.    Gastrointestinal: Negative.    Genitourinary: Negative.    Neurological: Negative.    Psychiatric/Behavioral: Negative.     Allergic/Immunologic: Negative.    Objective:     Vital Signs (Most Recent):  Temp: 97.5 °F (36.4 °C) (04/04/22 0728)  Pulse: 86 (04/04/22 0950)  Resp: 18 (04/04/22 0728)  BP: 127/80 (04/04/22 0728)  SpO2: 99 % (04/04/22 0728) Vital Signs (24h Range):  Temp:  [96.7 °F (35.9 °C)-98.1 °F (36.7 °C)] 97.5 °F (36.4 °C)  Pulse:  [70-86] 86  Resp:  [18-20] 18  SpO2:  [95 %-99 %] 99 %  BP: ()/(55-80) 127/80     Weight: 102.1 kg (225 lb)  Body mass index is 35.24 kg/m².     SpO2: 99 %  O2 Device (Oxygen Therapy): room air    No intake or output data in the 24 hours ending 04/04/22 1052    Lines/Drains/Airways       Peripheral Intravenous Line  Duration                  Peripheral IV - Single Lumen 04/02/22 1845 20 G Left Antecubital 1 day         Peripheral IV - Single Lumen 04/02/22 2013 20 G Right Hand 1 day                    Physical Exam  Vitals and nursing note reviewed.   Constitutional:       Appearance: He is well-developed.   HENT:      Head: Normocephalic.   Neck:      Vascular: No carotid bruit or JVD.   Cardiovascular:      Rate and Rhythm: Normal rate and regular rhythm.      Pulses: No midsystolic click and no opening snap.           Radial pulses are 2+ on the right side and 2+ on the left side.      Heart sounds: S1 normal and S2 normal. Heart sounds not distant. No murmur heard.    No friction rub. No S3 or S4 sounds.      Comments: Right femoral site: no hematoma or significant ecchymosis noted. Palpable pulse.  Pulmonary:      Effort: Pulmonary effort is normal.       Breath sounds: Normal breath sounds. No wheezing or rales.   Abdominal:      General: Bowel sounds are normal. There is no distension or abdominal bruit.      Palpations: Abdomen is soft.      Tenderness: There is no abdominal tenderness.   Musculoskeletal:      Cervical back: Normal range of motion and neck supple.   Skin:     General: Skin is warm.   Neurological:      Mental Status: He is alert and oriented to person, place, and time.   Psychiatric:         Behavior: Behavior normal.       Significant Labs: ABG: No results for input(s): PH, PCO2, HCO3, POCSATURATED, BE in the last 48 hours., Blood Culture: No results for input(s): LABBLOO in the last 48 hours., BMP:   Recent Labs   Lab 04/02/22 1846 04/03/22  0245 04/04/22  0909   GLU 99 97 109    140 138   K 4.2 4.5 4.1    105 104   CO2 21* 23 21*   BUN 17 19 15   CREATININE 0.8 0.9 0.8   CALCIUM 9.1 9.4 9.9   , CMP   Recent Labs   Lab 04/02/22 1846 04/03/22  0245 04/04/22  0909    140 138   K 4.2 4.5 4.1    105 104   CO2 21* 23 21*   GLU 99 97 109   BUN 17 19 15   CREATININE 0.8 0.9 0.8   CALCIUM 9.1 9.4 9.9   PROT 7.2  --   --    ALBUMIN 3.7  --   --    BILITOT 0.4  --   --    ALKPHOS 60  --   --    AST 36  --   --    ALT 30  --   --    ANIONGAP 11 12 13   ESTGFRAFRICA >60.0 >60 >60   EGFRNONAA >60.0 >60 >60   , CBC   Recent Labs   Lab 04/02/22 1846 04/03/22 0245 04/04/22  0018   WBC 9.52 10.00 8.26   HGB 16.4 16.4 14.5   HCT 47.8 49.2 42.1    300 231   , INR   Recent Labs   Lab 04/02/22 1958   INR 1.1   , Lipid Panel   Recent Labs   Lab 04/03/22  0245   CHOL 191   HDL 27*   LDLCALC 109.6   TRIG 272*   CHOLHDL 14.1*   , and Troponin   Recent Labs   Lab 04/03/22  0128 04/03/22  0626 04/03/22  1325   TROPONINI 3.307* 3.050* 3.125*       Significant Imaging: Echocardiogram: Transthoracic echo (TTE) complete (Cupid Only):   Results for orders placed or performed during the hospital encounter of 04/02/22   Echo   Result Value Ref  Range    BSA 2.2 m2    TDI SEPTAL 0.06 m/s    LV LATERAL E/E' RATIO 15.29 m/s    LV SEPTAL E/E' RATIO 17.83 m/s    LA WIDTH 4.24 cm    IVC diameter 1.74 cm    Left Ventricular Outflow Tract Mean Velocity 0.32225149948268 cm/s    Left Ventricular Outflow Tract Mean Gradient 1.81 mmHg    Pulmonary Valve Mean Velocity 0.64 m/s    TDI LATERAL 0.07 m/s    PV PEAK VELOCITY 0.92 cm/s    LVIDd 5.43 3.5 - 6.0 cm    IVS 0.97 0.6 - 1.1 cm    Posterior Wall 1.31 (A) 0.6 - 1.1 cm    Ao root annulus 2.14 cm    LVIDs 4.55 (A) 2.1 - 4.0 cm    FS 16 28 - 44 %    LA volume 68.44 cm3    Sinus 2.45 cm    LV mass 248.71 g    LA size 3.88 cm    RVDD 1.70 cm    TAPSE 2.02 cm    Left Ventricle Relative Wall Thickness 0.48 cm    AV mean gradient 4 mmHg    AV valve area 2.81 cm2    AV Velocity Ratio 0.69     AV index (prosthetic) 0.67     MV mean gradient 3 mmHg    MV valve area p 1/2 method 7.43 cm2    MV valve area by continuity eq 2.00 cm2    E/A ratio 1.75     Mean e' 0.07 m/s    E wave deceleration time 102.904195564859853 msec    Pulm vein S/D ratio 1.42     LVOT diameter 2.32 cm    LVOT area 4.2 cm2    LVOT peak aniceto 0.79 m/s    LVOT peak VTI 15.30 cm    Ao peak aniceto 1.15 m/s    Ao VTI 23.0 cm    Mr max aniceto 4.17 m/s    LVOT stroke volume 64.65 cm3    AV peak gradient 5 mmHg    MV peak gradient 7 mmHg    E/E' ratio 16.46 m/s    MV Peak E Aniceto 1.07 m/s    TR Max Aniceto 2.51 m/s    MV VTI 32.4 cm    MV stenosis pressure 1/2 time 29.110205122694 ms    MV Peak A Aniceto 0.61 m/s    PV Peak S Aniceto 0.48685542387079 m/s    PV Peak D Aniceto 0.45 m/s    LV Systolic Volume 95.03 mL    LV Systolic Volume Index 44.6 mL/m2    LV Diastolic Volume 143.27 mL    LV Diastolic Volume Index 67.26 mL/m2    LA Volume Index 32.1 mL/m2    LV Mass Index 117 g/m2    Echo EF Estimated 34 %    Left Atrium Minor Axis 4.73 cm    Left Atrium Major Axis 5.07 cm    Triscuspid Valve Regurgitation Peak Gradient 25 mmHg    Right Atrial Pressure (from IVC) 3 mmHg    EF 30 %    TV rest  pulmonary artery pressure 28 mmHg    Narrative    · Concentric hypertrophy and moderately decreased systolic function.  · Mild tricuspid regurgitation.  · The estimated ejection fraction is 30%.  · Grade I left ventricular diastolic dysfunction.  · There are segmental left ventricular wall motion abnormalities.  · With normal right ventricular systolic function.  · Normal central venous pressure (3 mmHg).  · The estimated PA systolic pressure is 28 mmHg.

## 2022-04-04 NOTE — NURSING
Pt up walking around room upon AM assessment. Pt states that he is ready to go home and asking when he will be discharged. Secure chat sent to MD Mulugeta.

## 2022-04-04 NOTE — PLAN OF CARE
Plan of care reviewed with patient. All questions answered til this point. No new questions as of now. Will continue care per unit policy and patient needs.

## 2022-04-04 NOTE — PROGRESS NOTES
[facility] - Telemetry ([facility])  Cardiology  Progress Note    Patient Name: Philipp Gonzales  MRN: [id number]  Admission Date: 4/2/2022  Hospital Length of Stay: 2 days  Code Status: Full Code   Attending Physician: Neeraj Armendariz, *   Primary Care Physician: Luana Hall NP  Expected Discharge Date: 4/4/2022  Principal Problem:ACS (acute coronary syndrome)    Subjective:     HPI:  42 yo male, cardiology consult for NSTEMI/ACS.  PMH legal blindness, anxiety, HLD, HTN, and tobacco use   C/o substernal chest pain that has been intermittent for the past 1 month, but worsened today. Pain usually worsens with exertion but resolves with rest, however, today the pain remained even with rest. started a few hours PTA. Pain is aching in nature, rated 9/10, and radiates into bilateral shoulders. Associated SOB and anxiety. In ER troponin up to 3.0, Cr 0.9 HGB 16  Ekg NSR, new LBBB compared to prior one in 2021  Started UFH and 300 mg Plavix load given in the ED. Patient was transferred to [facility] for NSTEMI  Hospital Course:   4/4/22: Pt seen this am. [place] well, wanted to go home. No anginal or CHF sxs.          Review of Systems   Constitutional: Negative.   HENT: Negative.     Eyes:  Positive for vision loss in left eye and vision loss in right eye.   Cardiovascular: Negative.    Respiratory: Negative.     Endocrine: Negative.    Hematologic/Lymphatic: Negative.    Skin: Negative.    Musculoskeletal: Negative.    Gastrointestinal: Negative.    Genitourinary: Negative.    Neurological: Negative.    Psychiatric/Behavioral: Negative.     Allergic/Immunologic: Negative.    Objective:     Vital Signs (Most Recent):  Temp: 97.5 °F (36.4 °C) (04/04/22 0728)  Pulse: 86 (04/04/22 0950)  Resp: 18 (04/04/22 0728)  BP: 127/80 (04/04/22 0728)  SpO2: 99 % (04/04/22 0728) Vital Signs (24h Range):  Temp:  [96.7 °F (35.9 °C)-98.1 °F (36.7 °C)] 97.5 °F (36.4 °C)  Pulse:  [70-86] 86  Resp:  [18-20] 18  SpO2:  [95 %-99 %] 99 %  BP:  ()/(55-80) 127/80     Weight: 102.1 kg (225 lb)  Body mass index is 35.24 kg/m².     SpO2: 99 %  O2 Device (Oxygen Therapy): room air    No intake or output data in the 24 hours ending 04/04/22 1052    Lines/Drains/Airways       Peripheral Intravenous Line  Duration                  Peripheral IV - Single Lumen 04/02/22 1845 20 G Left Antecubital 1 day         Peripheral IV - Single Lumen 04/02/22 2013 20 G Right Hand 1 day                    Physical Exam  Vitals and nursing note reviewed.   Constitutional:       Appearance: He is well-developed.   HENT:      Head: Normocephalic.   Neck:      Vascular: No carotid bruit or JVD.   Cardiovascular:      Rate and Rhythm: Normal rate and regular rhythm.      Pulses: No midsystolic click and no opening snap.           Radial pulses are 2+ on the right side and 2+ on the left side.      Heart sounds: S1 normal and S2 normal. Heart sounds not distant. No murmur heard.    No friction rub. No S3 or S4 sounds.      Comments: Right femoral site: no hematoma or significant ecchymosis noted. Palpable pulse.  Pulmonary:      Effort: Pulmonary effort is normal.      Breath sounds: Normal breath sounds. No wheezing or rales.   Abdominal:      General: Bowel sounds are normal. There is no distension or abdominal bruit.      Palpations: Abdomen is soft.      Tenderness: There is no abdominal tenderness.   Musculoskeletal:      Cervical back: Normal range of motion and neck supple.   Skin:     General: Skin is warm.   Neurological:      Mental Status: He is alert and oriented to person, place, and time.   Psychiatric:         Behavior: Behavior normal.       Significant Labs: ABG: No results for input(s): PH, PCO2, HCO3, POCSATURATED, BE in the last 48 hours., Blood Culture: No results for input(s): LABBLOO in the last 48 hours., BMP:   Recent Labs   Lab 04/02/22  1846 04/03/22  0245 04/04/22  0909   GLU 99 97 109    140 138   K 4.2 4.5 4.1    105 104   CO2 21* 23 21*    BUN 17 19 15   CREATININE 0.8 0.9 0.8   CALCIUM 9.1 9.4 9.9   , CMP   Recent Labs   Lab 04/02/22  1846 04/03/22  0245 04/04/22  0909    140 138   K 4.2 4.5 4.1    105 104   CO2 21* 23 21*   GLU 99 97 109   BUN 17 19 15   CREATININE 0.8 0.9 0.8   CALCIUM 9.1 9.4 9.9   PROT 7.2  --   --    ALBUMIN 3.7  --   --    BILITOT 0.4  --   --    ALKPHOS 60  --   --    AST 36  --   --    ALT 30  --   --    ANIONGAP 11 12 13   ESTGFRAFRICA >60.0 >60 >60   EGFRNONAA >60.0 >60 >60   , CBC   Recent Labs   Lab 04/02/22  1846 04/03/22  0245 04/04/22  0018   WBC 9.52 10.00 8.26   HGB 16.4 16.4 14.5   HCT 47.8 49.2 42.1    300 231   , INR   Recent Labs   Lab 04/02/22  1958   INR 1.1   , Lipid Panel   Recent Labs   Lab 04/03/22  0245   CHOL 191   HDL 27*   LDLCALC 109.6   TRIG 272*   CHOLHDL 14.1*   , and Troponin   Recent Labs   Lab 04/03/22  0128 04/03/22  0626 04/03/22  1325   TROPONINI 3.307* 3.050* 3.125*       Significant Imaging: Echocardiogram: Transthoracic echo (TTE) complete (Cupid Only):   Results for orders placed or performed during the hospital encounter of 04/02/22   Echo   Result Value Ref Range    BSA 2.2 m2    TDI SEPTAL 0.06 m/s    LV LATERAL E/E' RATIO 15.29 m/s    LV SEPTAL E/E' RATIO 17.83 m/s    LA WIDTH 4.24 cm    IVC diameter 1.74 cm    Left Ventricular Outflow Tract Mean Velocity 0.26105095086683 cm/s    Left Ventricular Outflow Tract Mean Gradient 1.81 mmHg    Pulmonary Valve Mean Velocity 0.64 m/s    TDI LATERAL 0.07 m/s    PV PEAK VELOCITY 0.92 cm/s    LVIDd 5.43 3.5 - 6.0 cm    IVS 0.97 0.6 - 1.1 cm    Posterior Wall 1.31 (A) 0.6 - 1.1 cm    Ao root annulus 2.14 cm    LVIDs 4.55 (A) 2.1 - 4.0 cm    FS 16 28 - 44 %    LA volume 68.44 cm3    Sinus 2.45 cm    LV mass 248.71 g    LA size 3.88 cm    RVDD 1.70 cm    TAPSE 2.02 cm    Left Ventricle Relative Wall Thickness 0.48 cm    AV mean gradient 4 mmHg    AV valve area 2.81 cm2    AV Velocity Ratio 0.69     AV index (prosthetic) 0.67      MV mean gradient 3 mmHg    MV valve area p 1/2 method 7.43 cm2    MV valve area by continuity eq 2.00 cm2    E/A ratio 1.75     Mean e' 0.07 m/s    E wave deceleration time 102.379146810672274 msec    Pulm vein S/D ratio 1.42     LVOT diameter 2.32 cm    LVOT area 4.2 cm2    LVOT peak aniceto 0.79 m/s    LVOT peak VTI 15.30 cm    Ao peak aniceto 1.15 m/s    Ao VTI 23.0 cm    Mr max aniceto 4.17 m/s    LVOT stroke volume 64.65 cm3    AV peak gradient 5 mmHg    MV peak gradient 7 mmHg    E/E' ratio 16.46 m/s    MV Peak E Aniceto 1.07 m/s    TR Max Aniceto 2.51 m/s    MV VTI 32.4 cm    MV stenosis pressure 1/2 time 29.426459149536 ms    MV Peak A Aniceto 0.61 m/s    PV Peak S Aniceto 0.30961734047423 m/s    PV Peak D Aniceto 0.45 m/s    LV Systolic Volume 95.03 mL    LV Systolic Volume Index 44.6 mL/m2    LV Diastolic Volume 143.27 mL    LV Diastolic Volume Index 67.26 mL/m2    LA Volume Index 32.1 mL/m2    LV Mass Index 117 g/m2    Echo EF Estimated 34 %    Left Atrium Minor Axis 4.73 cm    Left Atrium Major Axis 5.07 cm    Triscuspid Valve Regurgitation Peak Gradient 25 mmHg    Right Atrial Pressure (from IVC) 3 mmHg    EF 30 %    TV rest pulmonary artery pressure 28 mmHg    Narrative    · Concentric hypertrophy and moderately decreased systolic function.  · Mild tricuspid regurgitation.  · The estimated ejection fraction is 30%.  · Grade I left ventricular diastolic dysfunction.  · There are segmental left ventricular wall motion abnormalities.  · With normal right ventricular systolic function.  · Normal central venous pressure (3 mmHg).  · The estimated PA systolic pressure is 28 mmHg.        Assessment and Plan:     S/P MULTIVESSEL PCI BY DR. BLAND.  OPTIMIZE MED TX FOR CAD.  DAPT COMPLIANCE DISCUSSED.  ADD LOW DOSE ACE-I.  EF 40% ON CATH.  RECOMMEND REPEAT ECHO 4 - 6 WEEKS.  SMOKING CESSATION ADVISED.  CARDIAC DIET.  MAY D/C PT HOME TODAY WITH CLOSE F/U IN [facility] 1 WEEK.    NSTEMI (non-ST elevated myocardial  infarction)  NSTEMI/ACS  LBBB  HTN  Smoker  Legal blindness    --keep NPO and heparin gtt  --Arrange C today  --continue ASA plavix statin metoprolol  I have explained the risks, benefits, and alternatives of the procedure in detail with patient and family. The patient voices understanding and all questions have been addressed.  The patient agrees to proceed as planned.    Primary hypertension  See above note    Tobacco use  Smoker cessation    Hyperlipidemia  See above note        VTE Risk Mitigation (From admission, onward)         Ordered     IP VTE HIGH RISK PATIENT  Once         04/02/22 2122     Place sequential compression device  Until discontinued         04/02/22 2122     IP VTE HIGH RISK PATIENT  Once         04/02/22 2122                Michael Wiseman MD  Cardiology  [facility] - Telemetry ([facility])

## 2022-04-06 ENCOUNTER — TELEPHONE (OUTPATIENT)
Dept: CARDIOLOGY | Facility: CLINIC | Age: 42
End: 2022-04-06
Payer: MEDICARE

## 2022-04-06 NOTE — TELEPHONE ENCOUNTER
Taking ASA and Plavix both for a yr. After 04/05/2023, stop Plavix and only on ASA 81m g   Contacted pt verbalized understanding.            ----- Message from Drew Turk MD sent at 4/6/2022  1:10 PM CDT -----  Taking ASA and Plavix both for a yr. After 04/05/2023, stop Plavix and only on ASA 81m g  ----- Message -----  From: Lorri Barnhart MA  Sent: 4/5/2022  10:57 AM CDT  To: Drew Turk MD    Pt contacted and would like to know if he should take both the asa and plavix at the same time.  ----- Message -----  From: Arin Walton LPN  Sent: 4/5/2022   9:22 AM CDT  To: [facility] Staff    Pt requesting call back to discuss heart medication  discussed with him.   ----- Message -----  From: Matilde Jaimes  Sent: 4/5/2022   8:52 AM CDT  To: [facility] Staff    Patient would like a call back at [phone] in regards his medication. He would like to get clarification on how to take his medication. He would like to know today because he can't miss a dose    atorvastatin (LIPITOR) 80 MG tablet  aspirin (ECOTRIN) 81 MG EC tablet    Thanks

## 2022-04-07 ENCOUNTER — TELEPHONE (OUTPATIENT)
Dept: INTERNAL MEDICINE | Facility: CLINIC | Age: 42
End: 2022-04-07
Payer: MEDICARE

## 2022-04-07 NOTE — TELEPHONE ENCOUNTER
----- Message from James Restrepo sent at 4/7/2022 10:06 AM CDT -----  .Type:  Patient Returning Call    Who Called:.Philipp Gonzales    Who Left Message for Patient:Arin  Does the patient know what this is regarding?:returnng pt's call  Would the patient rather a call back or a response via [facility]? Call back  Best Call Back Number:.[phone]    Additional Information:

## 2022-04-07 NOTE — TELEPHONE ENCOUNTER
----- Message from Courtney Ramirez sent at 4/7/2022  9:45 AM CDT -----  Regarding: heart attack  Contact: patient  Patient states that he had a heart attack last Saturday, and was told to call and speak with Ms Hall, please call him back at [phone]

## 2022-04-13 ENCOUNTER — PATIENT OUTREACH (OUTPATIENT)
Dept: ADMINISTRATIVE | Facility: HOSPITAL | Age: 42
End: 2022-04-13
Payer: MEDICARE

## 2022-04-13 NOTE — PROGRESS NOTES
Called patient to confirm hospital follow up appointment that is scheduled on 4/14/22, patient is confirmed.

## 2022-04-14 ENCOUNTER — OFFICE VISIT (OUTPATIENT)
Dept: INTERNAL MEDICINE | Facility: CLINIC | Age: 42
End: 2022-04-14
Payer: MEDICARE

## 2022-04-14 VITALS
HEIGHT: 67 IN | HEART RATE: 81 BPM | BODY MASS INDEX: 35.47 KG/M2 | DIASTOLIC BLOOD PRESSURE: 74 MMHG | OXYGEN SATURATION: 95 % | WEIGHT: 226 LBS | TEMPERATURE: 97 F | SYSTOLIC BLOOD PRESSURE: 120 MMHG

## 2022-04-14 DIAGNOSIS — F41.1 GAD (GENERALIZED ANXIETY DISORDER): ICD-10-CM

## 2022-04-14 DIAGNOSIS — I24.9 ACS (ACUTE CORONARY SYNDROME): ICD-10-CM

## 2022-04-14 DIAGNOSIS — E78.5 HYPERLIPIDEMIA, UNSPECIFIED HYPERLIPIDEMIA TYPE: ICD-10-CM

## 2022-04-14 DIAGNOSIS — Z09 HOSPITAL DISCHARGE FOLLOW-UP: Primary | ICD-10-CM

## 2022-04-14 DIAGNOSIS — E66.9 OBESITY (BMI 35.0-39.9 WITHOUT COMORBIDITY): ICD-10-CM

## 2022-04-14 DIAGNOSIS — L30.9 DERMATITIS: ICD-10-CM

## 2022-04-14 DIAGNOSIS — I21.4 NSTEMI (NON-ST ELEVATED MYOCARDIAL INFARCTION): ICD-10-CM

## 2022-04-14 DIAGNOSIS — Z72.0 TOBACCO USE: ICD-10-CM

## 2022-04-14 DIAGNOSIS — I10 PRIMARY HYPERTENSION: ICD-10-CM

## 2022-04-14 PROCEDURE — 99214 PR OFFICE/OUTPT VISIT, EST, LEVL IV, 30-39 MIN: ICD-10-PCS | Mod: S$PBB,,, | Performed by: NURSE PRACTITIONER

## 2022-04-14 PROCEDURE — 99999 PR PBB SHADOW E&M-EST. PATIENT-LVL IV: ICD-10-PCS | Mod: PBBFAC,,, | Performed by: NURSE PRACTITIONER

## 2022-04-14 PROCEDURE — 99214 OFFICE O/P EST MOD 30 MIN: CPT | Mod: S$PBB,,, | Performed by: NURSE PRACTITIONER

## 2022-04-14 PROCEDURE — 99999 PR PBB SHADOW E&M-EST. PATIENT-LVL IV: CPT | Mod: PBBFAC,,, | Performed by: NURSE PRACTITIONER

## 2022-04-14 PROCEDURE — 99214 OFFICE O/P EST MOD 30 MIN: CPT | Mod: PBBFAC,PO | Performed by: NURSE PRACTITIONER

## 2022-04-14 RX ORDER — IBUPROFEN 200 MG
1 TABLET ORAL DAILY
Qty: 28 PATCH | Refills: 1 | Status: SHIPPED | OUTPATIENT
Start: 2022-04-14 | End: 2023-07-19 | Stop reason: ALTCHOICE

## 2022-04-14 RX ORDER — TRIAMCINOLONE ACETONIDE 0.25 MG/G
CREAM TOPICAL 2 TIMES DAILY
Qty: 15 G | Refills: 1 | Status: SHIPPED | OUTPATIENT
Start: 2022-04-14 | End: 2022-08-23

## 2022-04-14 NOTE — ASSESSMENT & PLAN NOTE
Doing patches at this time. Only smoking 5 cig/day vs previous 2 PPD. Rx sent for increased dose of patch.

## 2022-04-14 NOTE — ASSESSMENT & PLAN NOTE
Counseled on importance of diet and exercise in order to improve overall quality of life, and reduce risk of future comorbidities.

## 2022-04-14 NOTE — ASSESSMENT & PLAN NOTE
Has been having increased anxiety since heart attack. No medication changes for now. Would like to just stay on lexapro and follow up if it continues to be worsening.

## 2022-04-14 NOTE — ASSESSMENT & PLAN NOTE
No concerning findings on physical exam. Has been feeling significantly better since heart cath. Denies any SOB/CP. Has appropriate follow up with cardiology.

## 2022-04-14 NOTE — PROGRESS NOTES
"Subjective:       Patient ID: Philipp Gonzales is a 41 y.o. male.    Chief Complaint: Transitional Care (heartattack)    Mr. Gonzales presents to visit for hospital discharge follow up following NSTEMI and ACS from 4/2-4/4. HPI and hospital discharge summary copied below. Denies CP or SOB. Feels "great" since hospital discharge. Was able to cut yard the other day without any difficulty, SOB or CP. Already has appt with cardiology, Dr. Turk, but has concerns with transportation to [place]. Will try to get patient scheduled with provider in [facility].       Transitional Care Note    Family and/or Caretaker present at visit?  No  Diagnostic tests reviewed/disposition: I have reviewed all completed as well as pending diagnostic tests at the time of discharge.  Disease/illness education: NSTEMI, ACS, HLD, HTN  Home health/community services discussion/referrals: Patient does not have home health established from hospital visit.  They do not need home health.  If needed, we will set up home health for the patient.   Establishment or re-establishment of referral orders for community resources: No other necessary community resources.   Discussion with other health care providers: No discussion with other health care providers necessary.       HPI and discharge instructions copied from hospital encounter:    Mr. Gonzales is a 42 yo male with a PMHx of anxiety, HLD, HTN, and tobacco use who presented to [facility] ED in [facility] with c/o substernal chest pain that has been intermittent for the past 1 month, but worsened today. Pain usually worsens with exertion but resolves with rest, however, today the pain remained even with rest. started a few hours PTA. Pain is aching in nature, rated 9/10, and radiates into bilateral shoulders. Associated SOB and anxiety. Denies any N/V, diaphoresis, neck or jaw pain, numbness/tingling, palpitations, cough, ABD pain, back pain, HA, lightheadedness, dizziness, syncope, weakness, fever or chills. He " received 325 mg ASA and nitropaste in the ED, which resolved his chest pain. Initial work-up showed: Troponin 1.976, BNP 94, and unremarkable CXR. EKG showed new LBBB. EKG reviewed by Cardiology and no STEMI per Dr. Turk. UFH and 300 mg Plavix load given in the ED. Patient was transferred to [facility] and admitted under Hospital Medicine services.         Procedure(s) (LRB):  CATHETERIZATION, HEART, LEFT (Left)  Percutaneous coronary intervention (N/A)  INSERTION, STENT, CORONARY ARTERY (N/A)       Hospital Course:   40 y/o WM admitted with a dx of NSTEMI. Started on heparin drip , asa , statin  And BB . Cardiology consulted . S/P LHC   PCI x 2 . Tx reviewed . Cont current tx .  4/4 Pt was seen and examined at bedside . He was determined to be suitable for d/c   He was admitted with a Dx of NSTEMI / S/P C which show :lad 70-90%, dRCA 70%.PCI of lad with lauren to 0%, PCI of distal rca with LAUREN to 0% . Pt was monitor for 24 pot [facility] w/o any problems .  He will be d/c on BB , statin , asa , Plavix  and ACEi . Pt was advised to f/u with his PCP and cardiology in 1 to 2 weeks . Case was D/W cardiology which agree with this plan .          Patient Active Problem List   Diagnosis    Dental caries    JERMAINE (generalized anxiety disorder)    Attention deficit hyperactivity disorder    Gastroesophageal reflux disease without esophagitis    Hyperlipidemia    Obesity (BMI 35.0-39.9 without comorbidity)    Acute right-sided low back pain with right-sided sciatica    Pain, dental    Tobacco use    Chronic pain of right knee    ACS (acute coronary syndrome)    Primary hypertension    NSTEMI (non-ST elevated myocardial infarction)    Hospital discharge follow-up       Family History   Problem Relation Age of Onset    Macular degeneration Mother     Macular degeneration Maternal Grandfather     Hyperlipidemia Father     Diabetes Father     Heart attacks under age 50 Paternal Grandmother      Past Surgical History:    Procedure Laterality Date    CORONARY STENT PLACEMENT N/A 4/3/2022    Procedure: INSERTION, STENT, CORONARY ARTERY;  Surgeon: Eliud Cristina MD;  Location: [facility] CATH LAB;  Service: Cardiology;  Laterality: N/A;    HIP SURGERY      as child left    LEFT HEART CATHETERIZATION Left 4/3/2022    Procedure: CATHETERIZATION, HEART, LEFT;  Surgeon: Eliud Cristina MD;  Location: [facility] CATH LAB;  Service: Cardiology;  Laterality: Left;         Current Outpatient Medications:     aspirin (ECOTRIN) 81 MG EC tablet, Take 1 tablet (81 mg total) by mouth once daily., Disp: 90 tablet, Rfl: 3    atorvastatin (LIPITOR) 80 MG tablet, Take 1 tablet (80 mg total) by mouth once daily., Disp: 90 tablet, Rfl: 1    clopidogreL (PLAVIX) 75 mg tablet, Take 1 tablet (75 mg total) by mouth once daily., Disp: 30 tablet, Rfl: 2    EScitalopram oxalate (LEXAPRO) 20 MG tablet, Take 1 tablet (20 mg total) by mouth once daily., Disp: 90 tablet, Rfl: 3    fenofibrate (TRICOR) 54 MG tablet, Take 1 tablet (54 mg total) by mouth once daily., Disp: 90 tablet, Rfl: 3    lisinopriL (PRINIVIL,ZESTRIL) 2.5 MG tablet, Take 1 tablet (2.5 mg total) by mouth once daily., Disp: 90 tablet, Rfl: 1    metoprolol succinate (TOPROL-XL) 50 MG 24 hr tablet, Take 1 tablet (50 mg total) by mouth once daily., Disp: 90 tablet, Rfl: 1    pantoprazole (PROTONIX) 40 MG tablet, Take 1 tablet (40 mg total) by mouth once daily., Disp: 30 tablet, Rfl: 2    traZODone (DESYREL) 100 MG tablet, Take 1 tablet (100 mg total) by mouth nightly., Disp: 90 tablet, Rfl: 2    nicotine (NICODERM CQ) 14 mg/24 hr, Place 1 patch onto the skin once daily., Disp: 28 patch, Rfl: 1    triamcinolone acetonide 0.025% (KENALOG) 0.025 % cream, Apply topically 2 (two) times daily., Disp: 15 g, Rfl: 1    Review of Systems   Constitutional: Negative for appetite change, chills, fatigue and fever.   Respiratory: Negative for cough, chest tightness and shortness of breath.   "  Cardiovascular: Negative for chest pain, palpitations and leg swelling.   Gastrointestinal: Negative for abdominal pain, nausea and vomiting.   Endocrine: Negative for polydipsia, polyphagia and polyuria.   Musculoskeletal: Negative for gait problem.   Neurological: Positive for light-headedness (intermittent). Negative for dizziness and headaches.   Psychiatric/Behavioral: Negative for dysphoric mood. The patient is nervous/anxious.        Objective:   /74 (BP Location: Left arm, Patient Position: Sitting, BP Method: Medium (Manual))   Pulse 81   Temp 97.2 °F (36.2 °C) (Temporal)   Ht 5' 7" (1.702 m)   Wt 102.5 kg (225 lb 15.5 oz)   SpO2 95%   BMI 35.39 kg/m²      Physical Exam  Vitals reviewed.   Constitutional:       General: He is not in acute distress.     Appearance: Normal appearance. He is well-developed. He is obese. He is not ill-appearing or diaphoretic.   HENT:      Head: Normocephalic.   Cardiovascular:      Rate and Rhythm: Normal rate and regular rhythm.      Pulses: Normal pulses.      Heart sounds: Normal heart sounds. No murmur heard.    No friction rub. No gallop.   Pulmonary:      Effort: Pulmonary effort is normal. No respiratory distress.      Breath sounds: Normal breath sounds. No rales.   Musculoskeletal:         General: Normal range of motion.      Cervical back: Normal range of motion.      Right lower leg: No edema.      Left lower leg: No edema.   Skin:     General: Skin is warm and dry.      Coloration: Skin is not pale.      Findings: Rash present. No erythema.      Comments: Papular, erythematous area to posterior L calf.   Neurological:      Mental Status: He is alert and oriented to person, place, and time.   Psychiatric:         Mood and Affect: Mood normal.         Behavior: Behavior normal.         Assessment & Plan     Problem List Items Addressed This Visit        Psychiatric    JERMAINE (generalized anxiety disorder)    Current Assessment & Plan     Has been having " "increased anxiety since heart attack. No medication changes for now. Would like to just stay on lexapro and follow up if it continues to be worsening.               Cardiac/Vascular    Hyperlipidemia (Chronic)    Current Assessment & Plan     Lipid panel reviewed. Continue statin.            Primary hypertension (Chronic)    Current Assessment & Plan     Well controlled. Continue current medications.            ACS (acute coronary syndrome)    Current Assessment & Plan     Has follow up scheduled with Dr. Turk on 4/28/2022. Continue plavix, BB and statin.            NSTEMI (non-ST elevated myocardial infarction)    Current Assessment & Plan     Has follow up scheduled with Dr. Turk on 4/28/2022. Continue plavix, BB and statin.               Endocrine    Obesity (BMI 35.0-39.9 without comorbidity)    Current Assessment & Plan     Counseled on importance of diet and exercise in order to improve overall quality of life, and reduce risk of future comorbidities.               Other    Tobacco use (Chronic)    Current Assessment & Plan     Doing patches at this time. Only smoking 5 cig/day vs previous 2 PPD. Rx sent for increased dose of patch.            Relevant Medications    nicotine (NICODERM CQ) 14 mg/24 hr    Hospital discharge follow-up - Primary    Current Assessment & Plan     No concerning findings on physical exam. Has been feeling significantly better since heart cath. Denies any SOB/CP. Has appropriate follow up with cardiology.              Other Visit Diagnoses     Dermatitis        Relevant Medications    triamcinolone acetonide 0.025% (KENALOG) 0.025 % cream        Follow up in about 6 months (around 10/14/2022).            Portions of this note may have been created with voice recognition software. Occasional "wrong-word" or "sound-a-like" substitutions may have occurred due to the inherent limitations of voice recognition software. Please, read the note carefully and recognize, using context, where " substitutions have occurred.

## 2022-05-04 ENCOUNTER — OFFICE VISIT (OUTPATIENT)
Dept: CARDIOLOGY | Facility: CLINIC | Age: 42
End: 2022-05-04
Payer: MEDICARE

## 2022-05-04 VITALS
HEIGHT: 67 IN | WEIGHT: 233.25 LBS | HEART RATE: 80 BPM | DIASTOLIC BLOOD PRESSURE: 70 MMHG | BODY MASS INDEX: 36.61 KG/M2 | SYSTOLIC BLOOD PRESSURE: 120 MMHG | OXYGEN SATURATION: 98 %

## 2022-05-04 DIAGNOSIS — I21.4 NSTEMI (NON-ST ELEVATED MYOCARDIAL INFARCTION): Primary | ICD-10-CM

## 2022-05-04 DIAGNOSIS — E66.9 OBESITY (BMI 35.0-39.9 WITHOUT COMORBIDITY): ICD-10-CM

## 2022-05-04 DIAGNOSIS — F41.1 GAD (GENERALIZED ANXIETY DISORDER): ICD-10-CM

## 2022-05-04 DIAGNOSIS — E78.5 HYPERLIPIDEMIA, UNSPECIFIED HYPERLIPIDEMIA TYPE: ICD-10-CM

## 2022-05-04 DIAGNOSIS — Z09 HOSPITAL DISCHARGE FOLLOW-UP: ICD-10-CM

## 2022-05-04 DIAGNOSIS — F90.9 ATTENTION DEFICIT HYPERACTIVITY DISORDER (ADHD), UNSPECIFIED ADHD TYPE: ICD-10-CM

## 2022-05-04 DIAGNOSIS — Z72.0 TOBACCO USE: ICD-10-CM

## 2022-05-04 PROCEDURE — 99214 OFFICE O/P EST MOD 30 MIN: CPT | Mod: PBBFAC,PO | Performed by: INTERNAL MEDICINE

## 2022-05-04 PROCEDURE — 99214 OFFICE O/P EST MOD 30 MIN: CPT | Mod: S$PBB,,, | Performed by: INTERNAL MEDICINE

## 2022-05-04 PROCEDURE — 99214 PR OFFICE/OUTPT VISIT, EST, LEVL IV, 30-39 MIN: ICD-10-PCS | Mod: S$PBB,,, | Performed by: INTERNAL MEDICINE

## 2022-05-04 PROCEDURE — 99999 PR PBB SHADOW E&M-EST. PATIENT-LVL IV: CPT | Mod: PBBFAC,,, | Performed by: INTERNAL MEDICINE

## 2022-05-04 PROCEDURE — 99999 PR PBB SHADOW E&M-EST. PATIENT-LVL IV: ICD-10-PCS | Mod: PBBFAC,,, | Performed by: INTERNAL MEDICINE

## 2022-05-04 RX ORDER — METOPROLOL SUCCINATE 50 MG/1
50 TABLET, EXTENDED RELEASE ORAL DAILY
Qty: 90 TABLET | Refills: 3 | Status: SHIPPED | OUTPATIENT
Start: 2022-05-04 | End: 2022-08-17

## 2022-05-04 RX ORDER — LISINOPRIL 2.5 MG/1
2.5 TABLET ORAL DAILY
Qty: 90 TABLET | Refills: 3 | Status: SHIPPED | OUTPATIENT
Start: 2022-05-04 | End: 2023-05-12

## 2022-05-04 RX ORDER — ATORVASTATIN CALCIUM 80 MG/1
80 TABLET, FILM COATED ORAL DAILY
Qty: 90 TABLET | Refills: 3 | Status: SHIPPED | OUTPATIENT
Start: 2022-05-04 | End: 2023-05-12

## 2022-05-04 RX ORDER — CLOPIDOGREL BISULFATE 75 MG/1
75 TABLET ORAL DAILY
Qty: 90 TABLET | Refills: 3 | Status: SHIPPED | OUTPATIENT
Start: 2022-05-04 | End: 2023-05-12

## 2022-05-04 NOTE — PROGRESS NOTES
Subjective:   Patient ID:  Philipp Gonzales is a 41 y.o. male who presents for evaluation of Hospital Follow Up      HPI   Patient legally blind due to macular degeneration.  42 yo male, Smoker down from 2 packs a day to 5 cigarettes a day, recent NSTEMI with PCI to LAD and RCA, reviewed angiogram   States now on healthy food , used to do a lot of fast foods   He is chest pain free, compliant with medications, no dyspnea, no leg swelling, no palpitations   EF 40% by angiogram     Past Medical History:   Diagnosis Date    Anxiety     High cholesterol     Hyperlipidemia     Insomnia     Legally blind     Macular degeneration        Past Surgical History:   Procedure Laterality Date    CORONARY STENT PLACEMENT N/A 4/3/2022    Procedure: INSERTION, STENT, CORONARY ARTERY;  Surgeon: Eliud Cristina MD;  Location: [facility] CATH LAB;  Service: Cardiology;  Laterality: N/A;    HIP SURGERY      as child left    LEFT HEART CATHETERIZATION Left 4/3/2022    Procedure: CATHETERIZATION, HEART, LEFT;  Surgeon: Eliud Cristina MD;  Location: [facility] CATH LAB;  Service: Cardiology;  Laterality: Left;       Social History     Tobacco Use    Smoking status: Current Every Day Smoker     Packs/day: 1.00     Types: Cigarettes    Smokeless tobacco: Never Used   Substance Use Topics    Alcohol use: Yes     Comment: rarely    Drug use: No       Family History   Problem Relation Age of Onset    Macular degeneration Mother     Macular degeneration Maternal Grandfather     Hyperlipidemia Father     Diabetes Father     Heart attacks under age 50 Paternal Grandmother        Review of Systems   Constitutional: Negative for fever and malaise/fatigue.   HENT: Negative for sore throat.    Eyes: Negative for blurred vision.   Cardiovascular: Negative for chest pain, claudication, cyanosis, dyspnea on exertion, irregular heartbeat, leg swelling, near-syncope, orthopnea, palpitations, paroxysmal nocturnal dyspnea and syncope.    Respiratory: Negative for cough and hemoptysis.    Hematologic/Lymphatic: Negative for bleeding problem.   Skin: Negative for rash.   Musculoskeletal: Negative for falls.   Gastrointestinal: Negative for abdominal pain.   Genitourinary: Negative.    Neurological: Negative.    Psychiatric/Behavioral: Negative for altered mental status and substance abuse.       Current Outpatient Medications on File Prior to Visit   Medication Sig    aspirin (ECOTRIN) 81 MG EC tablet Take 1 tablet (81 mg total) by mouth once daily.    EScitalopram oxalate (LEXAPRO) 20 MG tablet Take 1 tablet (20 mg total) by mouth once daily.    fenofibrate (TRICOR) 54 MG tablet Take 1 tablet (54 mg total) by mouth once daily.    nicotine (NICODERM CQ) 14 mg/24 hr Place 1 patch onto the skin once daily.    pantoprazole (PROTONIX) 40 MG tablet Take 1 tablet (40 mg total) by mouth once daily.    traZODone (DESYREL) 100 MG tablet Take 1 tablet (100 mg total) by mouth nightly.    triamcinolone acetonide 0.025% (KENALOG) 0.025 % cream Apply topically 2 (two) times daily.    [DISCONTINUED] atorvastatin (LIPITOR) 80 MG tablet Take 1 tablet (80 mg total) by mouth once daily.    [DISCONTINUED] clopidogreL (PLAVIX) 75 mg tablet Take 1 tablet (75 mg total) by mouth once daily.    [DISCONTINUED] lisinopriL (PRINIVIL,ZESTRIL) 2.5 MG tablet Take 1 tablet (2.5 mg total) by mouth once daily.    [DISCONTINUED] metoprolol succinate (TOPROL-XL) 50 MG 24 hr tablet Take 1 tablet (50 mg total) by mouth once daily.     No current facility-administered medications on file prior to visit.       Objective:   Objective:  Wt Readings from Last 3 Encounters:   05/04/22 105.8 kg (233 lb 4 oz)   04/14/22 102.5 kg (225 lb 15.5 oz)   04/03/22 102.1 kg (225 lb)     Temp Readings from Last 3 Encounters:   04/14/22 97.2 °F (36.2 °C) (Temporal)   04/04/22 98.7 °F (37.1 °C) (Oral)   03/14/22 98.6 °F (37 °C) (Temporal)     BP Readings from Last 3 Encounters:   05/04/22 120/70    04/14/22 120/74   04/04/22 113/77     Pulse Readings from Last 3 Encounters:   05/04/22 80   04/14/22 81   04/04/22 75       Physical Exam  Vitals reviewed.   Constitutional:       Appearance: He is well-developed.   HENT:      Head: Normocephalic and atraumatic.   Eyes:      General: No scleral icterus.     Conjunctiva/sclera: Conjunctivae normal.   Cardiovascular:      Rate and Rhythm: Normal rate and regular rhythm.      Pulses: Intact distal pulses.      Heart sounds: Normal heart sounds. No murmur heard.  Pulmonary:      Effort: No respiratory distress.      Breath sounds: No wheezing or rales.   Chest:      Chest wall: No tenderness.   Abdominal:      General: Bowel sounds are normal. There is no distension.      Palpations: Abdomen is soft.      Tenderness: There is no guarding.   Musculoskeletal:         General: Normal range of motion.      Cervical back: Normal range of motion and neck supple.   Skin:     General: Skin is warm.   Neurological:      Mental Status: He is alert and oriented to person, place, and time.         Lab Results   Component Value Date    CHOL 191 04/03/2022    CHOL 226 (H) 03/14/2022    CHOL 201 (H) 11/21/2019     Lab Results   Component Value Date    HDL 27 (L) 04/03/2022    HDL 27 (L) 03/14/2022    HDL 28 (L) 11/21/2019     Lab Results   Component Value Date    LDLCALC 109.6 04/03/2022    LDLCALC Invalid, Trig>400.0 03/14/2022    LDLCALC Invalid, Trig>400.0 11/21/2019     Lab Results   Component Value Date    TRIG 272 (H) 04/03/2022    TRIG 548 (H) 03/14/2022    TRIG 437 (H) 11/21/2019     Lab Results   Component Value Date    CHOLHDL 14.1 (L) 04/03/2022    CHOLHDL 11.9 (L) 03/14/2022    CHOLHDL 13.9 (L) 11/21/2019       Chemistry        Component Value Date/Time     04/04/2022 0909    K 4.1 04/04/2022 0909     04/04/2022 0909    CO2 21 (L) 04/04/2022 0909    BUN 15 04/04/2022 0909    CREATININE 0.8 04/04/2022 0909     04/04/2022 0909        Component Value  Date/Time    CALCIUM 9.9 04/04/2022 0909    ALKPHOS 60 04/02/2022 1846    AST 36 04/02/2022 1846    ALT 30 04/02/2022 1846    BILITOT 0.4 04/02/2022 1846    ESTGFRAFRICA >60 04/04/2022 0909    EGFRNONAA >60 04/04/2022 0909          No results found for: TSH  Lab Results   Component Value Date    INR 1.1 04/02/2022     Lab Results   Component Value Date    WBC 8.26 04/04/2022    HGB 14.5 04/04/2022    HCT 42.1 04/04/2022    MCV 91 04/04/2022     04/04/2022     BNP  @LABRCNTIP(BNP,BNPTRIAGEBLO)@  CrCl cannot be calculated (Patient's most recent lab result is older than the maximum 7 days allowed.).     Imaging:  ======  Results for orders placed during the hospital encounter of 04/02/22    Echo    Interpretation Summary  · Concentric hypertrophy and moderately decreased systolic function.  · Mild tricuspid regurgitation.  · The estimated ejection fraction is 30%.  · Grade I left ventricular diastolic dysfunction.  · There are segmental left ventricular wall motion abnormalities.  · With normal right ventricular systolic function.  · Normal central venous pressure (3 mmHg).  · The estimated PA systolic pressure is 28 mmHg.    No results found for this or any previous visit.    No results found for this or any previous visit.    No results found for this or any previous visit.    No results found for this or any previous visit.    No valid procedures specified.    Diagnostic Results:  ECG: Reviewed    The ASCVD Risk score (Lee GENARO Jr., et al., 2013) failed to calculate for the following reasons:    The patient has a prior MI or stroke diagnosis    Assessment and Plan:   NSTEMI (non-ST elevated myocardial infarction)  -     clopidogreL (PLAVIX) 75 mg tablet; Take 1 tablet (75 mg total) by mouth once daily.  Dispense: 90 tablet; Refill: 3  -     atorvastatin (LIPITOR) 80 MG tablet; Take 1 tablet (80 mg total) by mouth once daily.  Dispense: 90 tablet; Refill: 3  -     lisinopriL (PRINIVIL,ZESTRIL) 2.5 MG tablet;  Take 1 tablet (2.5 mg total) by mouth once daily.  Dispense: 90 tablet; Refill: 3  -     metoprolol succinate (TOPROL-XL) 50 MG 24 hr tablet; Take 1 tablet (50 mg total) by mouth once daily.  Dispense: 90 tablet; Refill: 3    Hyperlipidemia, unspecified hyperlipidemia type    JERMAINE (generalized anxiety disorder)    Obesity (BMI 35.0-39.9 without comorbidity)    Tobacco use    Hospital discharge follow-up    Attention deficit hyperactivity disorder (ADHD), unspecified ADHD type      Order echo next visit   Reviewed all tests and above medical conditions with patient in detail and formulated treatment plan.  Risk factor modification discussed.   Cardiac low salt diet discussed.  Maintaining healthy weight and weight loss goals were discussed in clinic.  Counseled on smoking  Follow up in 3 months

## 2022-07-18 PROBLEM — Z09 HOSPITAL DISCHARGE FOLLOW-UP: Status: RESOLVED | Noted: 2022-04-14 | Resolved: 2022-07-18

## 2022-08-17 ENCOUNTER — OFFICE VISIT (OUTPATIENT)
Dept: CARDIOLOGY | Facility: CLINIC | Age: 42
End: 2022-08-17
Payer: MEDICARE

## 2022-08-17 VITALS
WEIGHT: 224.88 LBS | OXYGEN SATURATION: 96 % | HEIGHT: 67 IN | DIASTOLIC BLOOD PRESSURE: 68 MMHG | HEART RATE: 66 BPM | BODY MASS INDEX: 35.29 KG/M2 | SYSTOLIC BLOOD PRESSURE: 130 MMHG

## 2022-08-17 DIAGNOSIS — Z72.0 TOBACCO USE: ICD-10-CM

## 2022-08-17 DIAGNOSIS — E66.9 OBESITY (BMI 35.0-39.9 WITHOUT COMORBIDITY): ICD-10-CM

## 2022-08-17 DIAGNOSIS — I10 PRIMARY HYPERTENSION: ICD-10-CM

## 2022-08-17 DIAGNOSIS — I25.5 ISCHEMIC CARDIOMYOPATHY: Primary | ICD-10-CM

## 2022-08-17 DIAGNOSIS — F41.1 GAD (GENERALIZED ANXIETY DISORDER): ICD-10-CM

## 2022-08-17 DIAGNOSIS — I21.4 NSTEMI (NON-ST ELEVATED MYOCARDIAL INFARCTION): ICD-10-CM

## 2022-08-17 PROCEDURE — 99213 OFFICE O/P EST LOW 20 MIN: CPT | Mod: PBBFAC,PO | Performed by: INTERNAL MEDICINE

## 2022-08-17 PROCEDURE — 99999 PR PBB SHADOW E&M-EST. PATIENT-LVL III: CPT | Mod: PBBFAC,,, | Performed by: INTERNAL MEDICINE

## 2022-08-17 PROCEDURE — 99999 PR PBB SHADOW E&M-EST. PATIENT-LVL III: ICD-10-PCS | Mod: PBBFAC,,, | Performed by: INTERNAL MEDICINE

## 2022-08-17 PROCEDURE — 99214 OFFICE O/P EST MOD 30 MIN: CPT | Mod: S$PBB,,, | Performed by: INTERNAL MEDICINE

## 2022-08-17 PROCEDURE — 99214 PR OFFICE/OUTPT VISIT, EST, LEVL IV, 30-39 MIN: ICD-10-PCS | Mod: S$PBB,,, | Performed by: INTERNAL MEDICINE

## 2022-08-17 RX ORDER — METOPROLOL SUCCINATE 100 MG/1
100 TABLET, EXTENDED RELEASE ORAL DAILY
Qty: 90 TABLET | Refills: 3 | Status: SHIPPED | OUTPATIENT
Start: 2022-08-17 | End: 2023-07-19 | Stop reason: SDUPTHER

## 2022-08-17 NOTE — PROGRESS NOTES
Subjective:   Patient ID:  Philipp Gonzales is a 42 y.o. male who presents for evaluation of No chief complaint on file.      HPI   8.17.2022  Comes in for 3 month follow-up.  He is doing well.  No chest pain no dyspnea.  No bleeding.  Compliant with medications.  States that he is active without any angina.        5.2022  Patient legally blind due to macular degeneration.  40 yo male, Smoker down from 2 packs a day to 5 cigarettes a day, recent NSTEMI with PCI to LAD and RCA, reviewed angiogram   States now on healthy food , used to do a lot of fast foods   He is chest pain free, compliant with medications, no dyspnea, no leg swelling, no palpitations   EF 40% by angiogram     Past Medical History:   Diagnosis Date    Anxiety     High cholesterol     Hyperlipidemia     Insomnia     Legally blind     Macular degeneration        Past Surgical History:   Procedure Laterality Date    CORONARY STENT PLACEMENT N/A 4/3/2022    Procedure: INSERTION, STENT, CORONARY ARTERY;  Surgeon: Eliud Cristina MD;  Location: [facility] CATH LAB;  Service: Cardiology;  Laterality: N/A;    HIP SURGERY      as child left    LEFT HEART CATHETERIZATION Left 4/3/2022    Procedure: CATHETERIZATION, HEART, LEFT;  Surgeon: Eliud Cristina MD;  Location: [facility] CATH LAB;  Service: Cardiology;  Laterality: Left;       Social History     Tobacco Use    Smoking status: Current Every Day Smoker     Packs/day: 1.00     Types: Cigarettes    Smokeless tobacco: Never Used   Substance Use Topics    Alcohol use: Yes     Comment: rarely    Drug use: No       Family History   Problem Relation Age of Onset    Macular degeneration Mother     Macular degeneration Maternal Grandfather     Hyperlipidemia Father     Diabetes Father     Heart attacks under age 50 Paternal Grandmother        Review of Systems   Constitutional: Negative for fever and malaise/fatigue.   HENT: Negative for sore throat.    Eyes: Negative for blurred vision.    Cardiovascular: Negative for chest pain, claudication, cyanosis, dyspnea on exertion, irregular heartbeat, leg swelling, near-syncope, orthopnea, palpitations, paroxysmal nocturnal dyspnea and syncope.   Respiratory: Negative for cough and hemoptysis.    Hematologic/Lymphatic: Negative for bleeding problem.   Skin: Negative for rash.   Musculoskeletal: Negative for falls.   Gastrointestinal: Negative for abdominal pain.   Genitourinary: Negative.    Neurological: Negative.    Psychiatric/Behavioral: Negative for altered mental status and substance abuse.       Current Outpatient Medications on File Prior to Visit   Medication Sig    aspirin (ECOTRIN) 81 MG EC tablet Take 1 tablet (81 mg total) by mouth once daily.    atorvastatin (LIPITOR) 80 MG tablet Take 1 tablet (80 mg total) by mouth once daily.    clopidogreL (PLAVIX) 75 mg tablet Take 1 tablet (75 mg total) by mouth once daily.    EScitalopram oxalate (LEXAPRO) 20 MG tablet Take 1 tablet (20 mg total) by mouth once daily.    fenofibrate (TRICOR) 54 MG tablet Take 1 tablet (54 mg total) by mouth once daily.    lisinopriL (PRINIVIL,ZESTRIL) 2.5 MG tablet Take 1 tablet (2.5 mg total) by mouth once daily.    nicotine (NICODERM CQ) 14 mg/24 hr Place 1 patch onto the skin once daily.    pantoprazole (PROTONIX) 40 MG tablet Take 1 tablet (40 mg total) by mouth once daily.    traZODone (DESYREL) 100 MG tablet Take 1 tablet (100 mg total) by mouth nightly.    triamcinolone acetonide 0.025% (KENALOG) 0.025 % cream Apply topically 2 (two) times daily.    [DISCONTINUED] metoprolol succinate (TOPROL-XL) 50 MG 24 hr tablet Take 1 tablet (50 mg total) by mouth once daily.     No current facility-administered medications on file prior to visit.       Objective:   Objective:  Wt Readings from Last 3 Encounters:   08/17/22 102 kg (224 lb 13.9 oz)   05/04/22 105.8 kg (233 lb 4 oz)   04/14/22 102.5 kg (225 lb 15.5 oz)     Temp Readings from Last 3 Encounters:    04/14/22 97.2 °F (36.2 °C) (Temporal)   04/04/22 98.7 °F (37.1 °C) (Oral)   03/14/22 98.6 °F (37 °C) (Temporal)     BP Readings from Last 3 Encounters:   08/17/22 130/68   05/04/22 120/70   04/14/22 120/74     Pulse Readings from Last 3 Encounters:   08/17/22 66   05/04/22 80   04/14/22 81       Physical Exam  Vitals reviewed.   Constitutional:       Appearance: He is well-developed.   HENT:      Head: Normocephalic and atraumatic.   Eyes:      General: No scleral icterus.     Conjunctiva/sclera: Conjunctivae normal.   Cardiovascular:      Rate and Rhythm: Normal rate and regular rhythm.      Pulses: Intact distal pulses.      Heart sounds: Normal heart sounds. No murmur heard.  Pulmonary:      Effort: No respiratory distress.      Breath sounds: No wheezing or rales.   Chest:      Chest wall: No tenderness.   Abdominal:      General: Bowel sounds are normal. There is no distension.      Palpations: Abdomen is soft.      Tenderness: There is no guarding.   Musculoskeletal:         General: Normal range of motion.      Cervical back: Normal range of motion and neck supple.   Skin:     General: Skin is warm.   Neurological:      Mental Status: He is alert and oriented to person, place, and time.         Lab Results   Component Value Date    CHOL 191 04/03/2022    CHOL 226 (H) 03/14/2022    CHOL 201 (H) 11/21/2019     Lab Results   Component Value Date    HDL 27 (L) 04/03/2022    HDL 27 (L) 03/14/2022    HDL 28 (L) 11/21/2019     Lab Results   Component Value Date    LDLCALC 109.6 04/03/2022    LDLCALC Invalid, Trig>400.0 03/14/2022    LDLCALC Invalid, Trig>400.0 11/21/2019     Lab Results   Component Value Date    TRIG 272 (H) 04/03/2022    TRIG 548 (H) 03/14/2022    TRIG 437 (H) 11/21/2019     Lab Results   Component Value Date    CHOLHDL 14.1 (L) 04/03/2022    CHOLHDL 11.9 (L) 03/14/2022    CHOLHDL 13.9 (L) 11/21/2019       Chemistry        Component Value Date/Time     04/04/2022 0909    K 4.1 04/04/2022  0909     04/04/2022 0909    CO2 21 (L) 04/04/2022 0909    BUN 15 04/04/2022 0909    CREATININE 0.8 04/04/2022 0909     04/04/2022 0909        Component Value Date/Time    CALCIUM 9.9 04/04/2022 0909    ALKPHOS 60 04/02/2022 1846    AST 36 04/02/2022 1846    ALT 30 04/02/2022 1846    BILITOT 0.4 04/02/2022 1846    ESTGFRAFRICA >60 04/04/2022 0909    EGFRNONAA >60 04/04/2022 0909          No results found for: TSH  Lab Results   Component Value Date    INR 1.1 04/02/2022     Lab Results   Component Value Date    WBC 8.26 04/04/2022    HGB 14.5 04/04/2022    HCT 42.1 04/04/2022    MCV 91 04/04/2022     04/04/2022     BNP  @LABRCNTIP(BNP,BNPTRIAGEBLO)@  CrCl cannot be calculated (Patient's most recent lab result is older than the maximum 7 days allowed.).     Imaging:  ======  Results for orders placed during the hospital encounter of 04/02/22    Echo    Interpretation Summary  · Concentric hypertrophy and moderately decreased systolic function.  · Mild tricuspid regurgitation.  · The estimated ejection fraction is 30%.  · Grade I left ventricular diastolic dysfunction.  · There are segmental left ventricular wall motion abnormalities.  · With normal right ventricular systolic function.  · Normal central venous pressure (3 mmHg).  · The estimated PA systolic pressure is 28 mmHg.    No results found for this or any previous visit.    No results found for this or any previous visit.    No results found for this or any previous visit.    No results found for this or any previous visit.    No valid procedures specified.    Diagnostic Results:  ECG: Reviewed    The ASCVD Risk score ([place] GENARO Jr., et al., 2013) failed to calculate for the following reasons:    The patient has a prior MI or stroke diagnosis    Assessment and Plan:   Ischemic cardiomyopathy  -     Echo; Future    NSTEMI (non-ST elevated myocardial infarction)  -     metoprolol succinate (TOPROL-XL) 100 MG 24 hr tablet; Take 1 tablet (100 mg  total) by mouth once daily.  Dispense: 90 tablet; Refill: 3    Primary hypertension    Tobacco use    Obesity (BMI 35.0-39.9 without comorbidity)    JERMAINE (generalized anxiety disorder)      Ordered repeat echo since 3 months post PCI.  Increase his Toprol from 50 mg on 100mg.  Continue with dapt, statin, lisinopril.  Reviewed all tests and above medical conditions with patient in detail and formulated treatment plan.  Risk factor modification discussed.   Cardiac low salt diet discussed.  Maintaining healthy weight and weight loss goals were discussed in clinic.  Counseled on smoking  Follow up in 6 months

## 2022-09-21 ENCOUNTER — HOSPITAL ENCOUNTER (OUTPATIENT)
Dept: CARDIOLOGY | Facility: HOSPITAL | Age: 42
Discharge: HOME | End: 2022-09-21
Attending: INTERNAL MEDICINE
Payer: MEDICARE

## 2022-09-21 ENCOUNTER — OFFICE VISIT (OUTPATIENT)
Dept: CARDIOLOGY | Facility: CLINIC | Age: 42
End: 2022-09-21
Payer: MEDICARE

## 2022-09-21 VITALS
WEIGHT: 210.75 LBS | SYSTOLIC BLOOD PRESSURE: 130 MMHG | HEIGHT: 67 IN | SYSTOLIC BLOOD PRESSURE: 149 MMHG | HEIGHT: 67 IN | DIASTOLIC BLOOD PRESSURE: 74 MMHG | BODY MASS INDEX: 33.08 KG/M2 | DIASTOLIC BLOOD PRESSURE: 72 MMHG | BODY MASS INDEX: 35.16 KG/M2 | HEART RATE: 72 BPM | WEIGHT: 224 LBS | HEART RATE: 57 BPM | OXYGEN SATURATION: 97 %

## 2022-09-21 DIAGNOSIS — I10 PRIMARY HYPERTENSION: ICD-10-CM

## 2022-09-21 DIAGNOSIS — F90.9 ATTENTION DEFICIT HYPERACTIVITY DISORDER (ADHD), UNSPECIFIED ADHD TYPE: ICD-10-CM

## 2022-09-21 DIAGNOSIS — F41.1 GAD (GENERALIZED ANXIETY DISORDER): ICD-10-CM

## 2022-09-21 DIAGNOSIS — Z72.0 TOBACCO USE: ICD-10-CM

## 2022-09-21 DIAGNOSIS — R00.2 PALPITATIONS: Primary | ICD-10-CM

## 2022-09-21 DIAGNOSIS — E78.5 HYPERLIPIDEMIA, UNSPECIFIED HYPERLIPIDEMIA TYPE: ICD-10-CM

## 2022-09-21 DIAGNOSIS — I25.5 ISCHEMIC CARDIOMYOPATHY: ICD-10-CM

## 2022-09-21 LAB
AORTIC ROOT ANNULUS: 2.95 CM
AORTIC VALVE CUSP SEPERATION: 0 CM
AV INDEX (PROSTH): 0.69
AV MEAN GRADIENT: 6 MMHG
AV PEAK GRADIENT: 10 MMHG
AV VALVE AREA: 2.17 CM2
AV VELOCITY RATIO: 0.7
BSA FOR ECHO PROCEDURE: 2.19 M2
CV ECHO LV RWT: 0.34 CM
DOP CALC AO PEAK VEL: 1.55 M/S
DOP CALC AO VTI: 29.5 CM
DOP CALC LVOT AREA: 3.1 CM2
DOP CALC LVOT DIAMETER: 2 CM
DOP CALC LVOT PEAK VEL: 1.08 M/S
DOP CALC LVOT STROKE VOLUME: 64.06 CM3
DOP CALC RVOT PEAK VEL: 1.08 M/S
DOP CALC RVOT VTI: 25.2 CM
DOP CALCLVOT PEAK VEL VTI: 20.4 CM
E WAVE DECELERATION TIME: 240.89 MSEC
E/A RATIO: 1.16
E/E' RATIO: 11.33 M/S
ECHO LV POSTERIOR WALL: 0.93 CM (ref 0.6–1.1)
EJECTION FRACTION: 45 %
FRACTIONAL SHORTENING: 27 % (ref 28–44)
INTERVENTRICULAR SEPTUM: 0.95 CM (ref 0.6–1.1)
IVRT: 124.57 MSEC
LA MAJOR: 3.95 CM
LA MINOR: 4.1 CM
LA WIDTH: 3.2 CM
LEFT ATRIUM SIZE: 3.43 CM
LEFT ATRIUM VOLUME INDEX MOD: 12.3 ML/M2
LEFT ATRIUM VOLUME INDEX: 17.7 ML/M2
LEFT ATRIUM VOLUME MOD: 26 CM3
LEFT ATRIUM VOLUME: 37.54 CM3
LEFT INTERNAL DIMENSION IN SYSTOLE: 3.97 CM (ref 2.1–4)
LEFT VENTRICLE DIASTOLIC VOLUME INDEX: 67.26 ML/M2
LEFT VENTRICLE DIASTOLIC VOLUME: 142.6 ML
LEFT VENTRICLE MASS INDEX: 90 G/M2
LEFT VENTRICLE SYSTOLIC VOLUME INDEX: 32.5 ML/M2
LEFT VENTRICLE SYSTOLIC VOLUME: 68.8 ML
LEFT VENTRICULAR INTERNAL DIMENSION IN DIASTOLE: 5.42 CM (ref 3.5–6)
LEFT VENTRICULAR MASS: 191.79 G
LV LATERAL E/E' RATIO: 10.63 M/S
LV SEPTAL E/E' RATIO: 12.14 M/S
LVOT MG: 2.73 MMHG
LVOT MV: 0.77 CM/S
MV PEAK A VEL: 0.73 M/S
MV PEAK E VEL: 0.85 M/S
MV STENOSIS PRESSURE HALF TIME: 69.86 MS
MV VALVE AREA P 1/2 METHOD: 3.15 CM2
PISA TR MAX VEL: 2.14 M/S
PV MEAN GRADIENT: 2.56 MMHG
PV PEAK VELOCITY: 1.2 CM/S
RA MAJOR: 3.85 CM
RA PRESSURE: 8 MMHG
RA WIDTH: 3.46 CM
RIGHT VENTRICULAR END-DIASTOLIC DIMENSION: 3.62 CM
SINUS: 3.2 CM
STJ: 3.06 CM
TDI LATERAL: 0.08 M/S
TDI SEPTAL: 0.07 M/S
TDI: 0.08 M/S
TR MAX PG: 18 MMHG
TRICUSPID ANNULAR PLANE SYSTOLIC EXCURSION: 2.3 CM
TV REST PULMONARY ARTERY PRESSURE: 26 MMHG

## 2022-09-21 PROCEDURE — 99999 PR PBB SHADOW E&M-EST. PATIENT-LVL III: ICD-10-PCS | Mod: PBBFAC,,, | Performed by: INTERNAL MEDICINE

## 2022-09-21 PROCEDURE — 93306 TTE W/DOPPLER COMPLETE: CPT | Mod: PO

## 2022-09-21 PROCEDURE — 93306 ECHO (CUPID ONLY): ICD-10-PCS | Mod: 26,,, | Performed by: INTERNAL MEDICINE

## 2022-09-21 PROCEDURE — 93306 TTE W/DOPPLER COMPLETE: CPT | Mod: 26,,, | Performed by: INTERNAL MEDICINE

## 2022-09-21 PROCEDURE — 99999 PR PBB SHADOW E&M-EST. PATIENT-LVL III: CPT | Mod: PBBFAC,,, | Performed by: INTERNAL MEDICINE

## 2022-09-21 PROCEDURE — 99214 OFFICE O/P EST MOD 30 MIN: CPT | Mod: S$PBB,,, | Performed by: INTERNAL MEDICINE

## 2022-09-21 PROCEDURE — 99213 OFFICE O/P EST LOW 20 MIN: CPT | Mod: PBBFAC,25,PO | Performed by: INTERNAL MEDICINE

## 2022-09-21 PROCEDURE — 99214 PR OFFICE/OUTPT VISIT, EST, LEVL IV, 30-39 MIN: ICD-10-PCS | Mod: S$PBB,,, | Performed by: INTERNAL MEDICINE

## 2022-09-21 NOTE — PROGRESS NOTES
Subjective:   Patient ID:  Philipp Gonzales is a 42 y.o. male who presents for evaluation of No chief complaint on file.      HPI   9.21.2022  Comes in for a follow-up.  He was supposed come back in 6 months however he was getting his echocardiogram done today and had complained to the echo technician of chest pain.    When interviewed on exam today he denies any chest pain states that he has fluttering of his heart.  States lasts only for few seconds twice a day for last week or 2.    He also reports that since I increased his Toprol last visit from 50- to 00 mg has been feeling cold and sweating and feeling shaky sometimes.    He denies any allergies but reports feeling shaky was activity.    He reports he used to drink more  energy drinks in the past but he still does every now and then,  no excessive amount of coffee  8.17.2022  Comes in for 3 month follow-up.  He is doing well.  No chest pain no dyspnea.  No bleeding.  Compliant with medications.  States that he is active without any angina.        5.2022  Patient legally blind due to macular degeneration.  42 yo male, Smoker down from 2 packs a day to 5 cigarettes a day, recent NSTEMI with PCI to LAD and RCA, reviewed angiogram   States now on healthy food , used to do a lot of fast foods   He is chest pain free, compliant with medications, no dyspnea, no leg swelling, no palpitations   EF 40% by angiogram     Past Medical History:   Diagnosis Date    Anxiety     High cholesterol     Hyperlipidemia     Insomnia     Legally blind     Macular degeneration        Past Surgical History:   Procedure Laterality Date    CORONARY STENT PLACEMENT N/A 4/3/2022    Procedure: INSERTION, STENT, CORONARY ARTERY;  Surgeon: Eliud Cristina MD;  Location: [facility] CATH LAB;  Service: Cardiology;  Laterality: N/A;    HIP SURGERY      as child left    LEFT HEART CATHETERIZATION Left 4/3/2022    Procedure: CATHETERIZATION, HEART, LEFT;  Surgeon: Eliud Cristina MD;   Location: [facility] CATH LAB;  Service: Cardiology;  Laterality: Left;       Social History     Tobacco Use    Smoking status: Every Day     Packs/day: 1.00     Types: Cigarettes    Smokeless tobacco: Never   Substance Use Topics    Alcohol use: Yes     Comment: rarely    Drug use: No       Family History   Problem Relation Age of Onset    Macular degeneration Mother     Macular degeneration Maternal Grandfather     Hyperlipidemia Father     Diabetes Father     Heart attacks under age 50 Paternal Grandmother        Review of Systems   Constitutional: Negative for fever and malaise/fatigue.   HENT:  Negative for sore throat.    Eyes:  Negative for blurred vision.   Cardiovascular:  Negative for chest pain, claudication, cyanosis, dyspnea on exertion, irregular heartbeat, leg swelling, near-syncope, orthopnea, palpitations, paroxysmal nocturnal dyspnea and syncope.   Respiratory:  Negative for cough and hemoptysis.    Hematologic/Lymphatic: Negative for bleeding problem.   Skin:  Negative for rash.   Musculoskeletal:  Negative for falls.   Gastrointestinal:  Negative for abdominal pain.   Genitourinary: Negative.    Neurological: Negative.    Psychiatric/Behavioral:  Negative for altered mental status and substance abuse.      Current Outpatient Medications on File Prior to Visit   Medication Sig    aspirin (ECOTRIN) 81 MG EC tablet Take 1 tablet (81 mg total) by mouth once daily.    atorvastatin (LIPITOR) 80 MG tablet Take 1 tablet (80 mg total) by mouth once daily.    clopidogreL (PLAVIX) 75 mg tablet Take 1 tablet (75 mg total) by mouth once daily.    EScitalopram oxalate (LEXAPRO) 20 MG tablet Take 1 tablet (20 mg total) by mouth once daily.    fenofibrate (TRICOR) 54 MG tablet Take 1 tablet (54 mg total) by mouth once daily.    lisinopriL (PRINIVIL,ZESTRIL) 2.5 MG tablet Take 1 tablet (2.5 mg total) by mouth once daily.    metoprolol succinate (TOPROL-XL) 100 MG 24 hr tablet Take 1 tablet (100 mg total) by mouth once  daily.    nicotine (NICODERM CQ) 14 mg/24 hr Place 1 patch onto the skin once daily.    pantoprazole (PROTONIX) 40 MG tablet Take 1 tablet (40 mg total) by mouth once daily.    traZODone (DESYREL) 100 MG tablet Take 1 tablet (100 mg total) by mouth nightly.    triamcinolone acetonide 0.025% (KENALOG) 0.025 % cream APPLY TO AFFECTED AREA TWO TIMES A DAY     No current facility-administered medications on file prior to visit.       Objective:   Objective:  Wt Readings from Last 3 Encounters:   09/21/22 95.6 kg (210 lb 12.2 oz)   09/21/22 101.6 kg (224 lb)   08/17/22 102 kg (224 lb 13.9 oz)     Temp Readings from Last 3 Encounters:   04/14/22 97.2 °F (36.2 °C) (Temporal)   04/04/22 98.7 °F (37.1 °C) (Oral)   03/14/22 98.6 °F (37 °C) (Temporal)     BP Readings from Last 3 Encounters:   09/21/22 130/74   09/21/22 (!) 149/72   08/17/22 130/68     Pulse Readings from Last 3 Encounters:   09/21/22 72   09/21/22 (!) 57   08/17/22 66       Physical Exam  Vitals reviewed.   Constitutional:       Appearance: He is well-developed.   HENT:      Head: Normocephalic and atraumatic.   Eyes:      General: No scleral icterus.     Conjunctiva/sclera: Conjunctivae normal.   Cardiovascular:      Rate and Rhythm: Normal rate and regular rhythm.      Pulses: Intact distal pulses.      Heart sounds: Normal heart sounds. No murmur heard.  Pulmonary:      Effort: No respiratory distress.      Breath sounds: No wheezing or rales.   Chest:      Chest wall: No tenderness.   Abdominal:      General: Bowel sounds are normal. There is no distension.      Palpations: Abdomen is soft.      Tenderness: There is no guarding.   Musculoskeletal:         General: Normal range of motion.      Cervical back: Normal range of motion and neck supple.   Skin:     General: Skin is warm.   Neurological:      Mental Status: He is alert and oriented to person, place, and time.       Lab Results   Component Value Date    CHOL 191 04/03/2022    CHOL 226 (H)  03/14/2022    CHOL 201 (H) 11/21/2019     Lab Results   Component Value Date    HDL 27 (L) 04/03/2022    HDL 27 (L) 03/14/2022    HDL 28 (L) 11/21/2019     Lab Results   Component Value Date    LDLCALC 109.6 04/03/2022    LDLCALC Invalid, Trig>400.0 03/14/2022    LDLCALC Invalid, Trig>400.0 11/21/2019     Lab Results   Component Value Date    TRIG 272 (H) 04/03/2022    TRIG 548 (H) 03/14/2022    TRIG 437 (H) 11/21/2019     Lab Results   Component Value Date    CHOLHDL 14.1 (L) 04/03/2022    CHOLHDL 11.9 (L) 03/14/2022    CHOLHDL 13.9 (L) 11/21/2019       Chemistry        Component Value Date/Time     04/04/2022 0909    K 4.1 04/04/2022 0909     04/04/2022 0909    CO2 21 (L) 04/04/2022 0909    BUN 15 04/04/2022 0909    CREATININE 0.8 04/04/2022 0909     04/04/2022 0909        Component Value Date/Time    CALCIUM 9.9 04/04/2022 0909    ALKPHOS 60 04/02/2022 1846    AST 36 04/02/2022 1846    ALT 30 04/02/2022 1846    BILITOT 0.4 04/02/2022 1846    ESTGFRAFRICA >60 04/04/2022 0909    EGFRNONAA >60 04/04/2022 0909          Lab Results   Component Value Date    TSH 0.866 09/21/2022     Lab Results   Component Value Date    INR 1.1 04/02/2022     Lab Results   Component Value Date    WBC 8.26 04/04/2022    HGB 14.5 04/04/2022    HCT 42.1 04/04/2022    MCV 91 04/04/2022     04/04/2022     BNP  @LABRCNTIP(BNP,BNPTRIAGEBLO)@  CrCl cannot be calculated (Patient's most recent lab result is older than the maximum 7 days allowed.).     Imaging:  ======  Results for orders placed during the hospital encounter of 04/02/22    Echo    Interpretation Summary  · Concentric hypertrophy and moderately decreased systolic function.  · Mild tricuspid regurgitation.  · The estimated ejection fraction is 30%.  · Grade I left ventricular diastolic dysfunction.  · There are segmental left ventricular wall motion abnormalities.  · With normal right ventricular systolic function.  · Normal central venous pressure (3  mmHg).  · The estimated PA systolic pressure is 28 mmHg.    No results found for this or any previous visit.    No results found for this or any previous visit.    No results found for this or any previous visit.    No results found for this or any previous visit.    No valid procedures specified.    Diagnostic Results:  ECG: Reviewed    Results for orders placed during the hospital encounter of 09/21/22    Echo    Interpretation Summary  · The left ventricle is normal in size with mildly decreased systolic function.  · The estimated ejection fraction is 45%.  · Grade I left ventricular diastolic dysfunction.  · There is mild left ventricular global hypokinesis.  · Normal right ventricular size with normal right ventricular systolic function.  · Mild tricuspid regurgitation.  · Intermediate central venous pressure (8 mmHg).  · The estimated PA systolic pressure is 26 mmHg.      The ASCVD Risk score ([place] CARTER, et al., 2019) failed to calculate for the following reasons:    The patient has a prior MI or stroke diagnosis    Assessment and Plan:   Palpitations  -     Holter monitor - 48 hour; Future  -     CK; Future; Expected date: 09/21/2022  -     TSH; Future; Expected date: 09/21/2022    Primary hypertension    Hyperlipidemia, unspecified hyperlipidemia type    Tobacco use    JERMAINE (generalized anxiety disorder)    Attention deficit hyperactivity disorder (ADHD), unspecified ADHD type    His echocardiogram today showed mild improvement in his EF from 40% to 45%.    Will decrease back Toprol from 100 mg to 50 mg he reports side effects and intolerance.  Merritt unclear symptoms of shaking, will check a CK.    Check Holter monitor for infrequent palpitations and TSH.  Continue with dapt, statin, lisinopril.  Reviewed all tests and above medical conditions with patient in detail and formulated treatment plan.  Risk factor modification discussed.   Cardiac low salt diet discussed.  Maintaining healthy weight and weight loss  goals were discussed in clinic.  Counseled on smoking  Follow up in 6 months

## 2022-10-03 ENCOUNTER — HOSPITAL ENCOUNTER (OUTPATIENT)
Dept: CARDIOLOGY | Facility: HOSPITAL | Age: 42
Discharge: HOME | End: 2022-10-03
Attending: INTERNAL MEDICINE
Payer: MEDICARE

## 2022-10-03 DIAGNOSIS — R00.2 PALPITATIONS: ICD-10-CM

## 2022-10-03 PROCEDURE — 93225 XTRNL ECG REC<48 HRS REC: CPT | Mod: PO

## 2022-10-03 PROCEDURE — 93227 HOLTER MONITOR - 48 HOUR (CUPID ONLY): ICD-10-PCS | Mod: ,,, | Performed by: STUDENT IN AN ORGANIZED HEALTH CARE EDUCATION/TRAINING PROGRAM

## 2022-10-03 PROCEDURE — 93227 XTRNL ECG REC<48 HR R&I: CPT | Mod: ,,, | Performed by: STUDENT IN AN ORGANIZED HEALTH CARE EDUCATION/TRAINING PROGRAM

## 2022-10-04 ENCOUNTER — NURSE TRIAGE (OUTPATIENT)
Dept: ADMINISTRATIVE | Facility: CLINIC | Age: 42
End: 2022-10-04
Payer: MEDICARE

## 2022-10-04 NOTE — TELEPHONE ENCOUNTER
Pt reports has a Holter monitor on, but the 'sticky pads' are not staying on his skin and he doesn't have any more to replace them for tonight, Call placed to the cardiology on call MD, Dr. Michael Wiseman, who advised that pt can just call the clinic in the morning to speak to the Holter Monitor department about getting a replacement. Pt informed and stated that he has an appointment tomorrow to return the machine, pt states he might just go to the [facility] facility that is near him to see if they can give him replacement pads as he doesn't want to mess up the monitoring and have to do it all again. Pt encouraged to call back with any worsening symptoms or questions and  verbalized understanding.    Reason for Disposition   Nursing judgment    Protocols used: No Guideline or Reference [id number]

## 2022-10-08 LAB
OHS CV EVENT MONITOR DAY: 0
OHS CV HOLTER LENGTH DECIMAL HOURS: 48
OHS CV HOLTER LENGTH HOURS: 48
OHS CV HOLTER LENGTH MINUTES: 0
OHS CV HOLTER SINUS AVERAGE HR: 94
OHS CV HOLTER SINUS MAX HR: 152
OHS CV HOLTER SINUS MIN HR: 58

## 2023-02-15 ENCOUNTER — TELEPHONE (OUTPATIENT)
Dept: SLEEP MEDICINE | Facility: CLINIC | Age: 43
End: 2023-02-15
Payer: MEDICARE

## 2023-02-15 ENCOUNTER — OFFICE VISIT (OUTPATIENT)
Dept: CARDIOLOGY | Facility: CLINIC | Age: 43
End: 2023-02-15
Payer: MEDICARE

## 2023-02-15 VITALS
HEIGHT: 67 IN | SYSTOLIC BLOOD PRESSURE: 127 MMHG | DIASTOLIC BLOOD PRESSURE: 71 MMHG | WEIGHT: 209.44 LBS | BODY MASS INDEX: 32.87 KG/M2 | HEART RATE: 65 BPM | OXYGEN SATURATION: 95 %

## 2023-02-15 DIAGNOSIS — G47.10 HYPERSOMNIA, UNSPECIFIED: ICD-10-CM

## 2023-02-15 DIAGNOSIS — R29.818 SUSPECTED SLEEP APNEA: Primary | ICD-10-CM

## 2023-02-15 DIAGNOSIS — F90.9 ATTENTION DEFICIT HYPERACTIVITY DISORDER (ADHD), UNSPECIFIED ADHD TYPE: ICD-10-CM

## 2023-02-15 DIAGNOSIS — E66.9 OBESITY (BMI 35.0-39.9 WITHOUT COMORBIDITY): ICD-10-CM

## 2023-02-15 DIAGNOSIS — F41.1 GAD (GENERALIZED ANXIETY DISORDER): ICD-10-CM

## 2023-02-15 DIAGNOSIS — I10 PRIMARY HYPERTENSION: Chronic | ICD-10-CM

## 2023-02-15 DIAGNOSIS — E78.5 HYPERLIPIDEMIA, UNSPECIFIED HYPERLIPIDEMIA TYPE: Chronic | ICD-10-CM

## 2023-02-15 DIAGNOSIS — Z95.5 S/P CORONARY ARTERY STENT PLACEMENT: ICD-10-CM

## 2023-02-15 DIAGNOSIS — Z72.0 TOBACCO USE: Chronic | ICD-10-CM

## 2023-02-15 PROCEDURE — 3074F PR MOST RECENT SYSTOLIC BLOOD PRESSURE < 130 MM HG: ICD-10-PCS | Mod: CPTII,S$GLB,, | Performed by: INTERNAL MEDICINE

## 2023-02-15 PROCEDURE — 3008F BODY MASS INDEX DOCD: CPT | Mod: CPTII,S$GLB,, | Performed by: INTERNAL MEDICINE

## 2023-02-15 PROCEDURE — 99214 OFFICE O/P EST MOD 30 MIN: CPT | Mod: S$GLB,,, | Performed by: INTERNAL MEDICINE

## 2023-02-15 PROCEDURE — 3078F PR MOST RECENT DIASTOLIC BLOOD PRESSURE < 80 MM HG: ICD-10-PCS | Mod: CPTII,S$GLB,, | Performed by: INTERNAL MEDICINE

## 2023-02-15 PROCEDURE — 3074F SYST BP LT 130 MM HG: CPT | Mod: CPTII,S$GLB,, | Performed by: INTERNAL MEDICINE

## 2023-02-15 PROCEDURE — 99999 PR PBB SHADOW E&M-EST. PATIENT-LVL III: ICD-10-PCS | Mod: PBBFAC,,, | Performed by: INTERNAL MEDICINE

## 2023-02-15 PROCEDURE — 1159F PR MEDICATION LIST DOCUMENTED IN MEDICAL RECORD: ICD-10-PCS | Mod: CPTII,S$GLB,, | Performed by: INTERNAL MEDICINE

## 2023-02-15 PROCEDURE — 1159F MED LIST DOCD IN RCRD: CPT | Mod: CPTII,S$GLB,, | Performed by: INTERNAL MEDICINE

## 2023-02-15 PROCEDURE — 99999 PR PBB SHADOW E&M-EST. PATIENT-LVL III: CPT | Mod: PBBFAC,,, | Performed by: INTERNAL MEDICINE

## 2023-02-15 PROCEDURE — 3008F PR BODY MASS INDEX (BMI) DOCUMENTED: ICD-10-PCS | Mod: CPTII,S$GLB,, | Performed by: INTERNAL MEDICINE

## 2023-02-15 PROCEDURE — 99214 PR OFFICE/OUTPT VISIT, EST, LEVL IV, 30-39 MIN: ICD-10-PCS | Mod: S$GLB,,, | Performed by: INTERNAL MEDICINE

## 2023-02-15 PROCEDURE — 3078F DIAST BP <80 MM HG: CPT | Mod: CPTII,S$GLB,, | Performed by: INTERNAL MEDICINE

## 2023-02-15 NOTE — PROGRESS NOTES
Subjective:   Patient ID:  Philipp Gonzales is a 42 y.o. male who presents for evaluation of Follow-up (6 mth/)        HPI   2.15.2023  Comes in for 6 months follow up   Denies chest pain or brennan  Compliant with meds   He reports daytime somnolence, choking sometimes wakes up, non restorative sleep, hypersomnia  Cad , htn , obesity       9.21.2022  Comes in for a follow-up.  He was supposed come back in 6 months however he was getting his echocardiogram done today and had complained to the echo technician of chest pain.    When interviewed on exam today he denies any chest pain states that he has fluttering of his heart.  States lasts only for few seconds twice a day for last week or 2.    He also reports that since I increased his Toprol last visit from 50- to 00 mg has been feeling cold and sweating and feeling shaky sometimes.    He denies any allergies but reports feeling shaky was activity.    He reports he used to drink more  energy drinks in the past but he still does every now and then,  no excessive amount of coffee  8.17.2022  Comes in for 3 month follow-up.  He is doing well.  No chest pain no dyspnea.  No bleeding.  Compliant with medications.  States that he is active without any angina.        5.2022  Patient legally blind due to macular degeneration.  42 yo male, Smoker down from 2 packs a day to 5 cigarettes a day, recent NSTEMI with PCI to LAD and RCA, reviewed angiogram   States now on healthy food , used to do a lot of fast foods   He is chest pain free, compliant with medications, no dyspnea, no leg swelling, no palpitations   EF 40% by angiogram     Past Medical History:   Diagnosis Date    Anxiety     High cholesterol     Hyperlipidemia     Insomnia     Legally blind     Macular degeneration        Past Surgical History:   Procedure Laterality Date    CORONARY STENT PLACEMENT N/A 4/3/2022    Procedure: INSERTION, STENT, CORONARY ARTERY;  Surgeon: Eliud Cristina MD;  Location: [facility] CATH  LAB;  Service: Cardiology;  Laterality: N/A;    HIP SURGERY      as child left    LEFT HEART CATHETERIZATION Left 4/3/2022    Procedure: CATHETERIZATION, HEART, LEFT;  Surgeon: Eliud Cristina MD;  Location: [facility] CATH LAB;  Service: Cardiology;  Laterality: Left;       Social History     Tobacco Use    Smoking status: Every Day     Packs/day: 1.00     Types: Cigarettes    Smokeless tobacco: Never   Substance Use Topics    Alcohol use: Yes     Comment: rarely    Drug use: No       Family History   Problem Relation Age of Onset    Macular degeneration Mother     Macular degeneration Maternal Grandfather     Hyperlipidemia Father     Diabetes Father     Heart attacks under age 50 Paternal Grandmother        Review of Systems   Constitutional: Negative for fever and malaise/fatigue.   HENT:  Negative for sore throat.    Eyes:  Negative for blurred vision.   Cardiovascular:  Negative for chest pain, claudication, cyanosis, dyspnea on exertion, irregular heartbeat, leg swelling, near-syncope, orthopnea, palpitations, paroxysmal nocturnal dyspnea and syncope.   Respiratory:  Negative for cough and hemoptysis.    Hematologic/Lymphatic: Negative for bleeding problem.   Skin:  Negative for rash.   Musculoskeletal:  Negative for falls.   Gastrointestinal:  Negative for abdominal pain.   Genitourinary: Negative.    Neurological: Negative.    Psychiatric/Behavioral:  Negative for altered mental status and substance abuse.      Current Outpatient Medications on File Prior to Visit   Medication Sig    aspirin (ECOTRIN) 81 MG EC tablet TAKE ONE TABLET BY MOUTH EVERY DAY    atorvastatin (LIPITOR) 80 MG tablet Take 1 tablet (80 mg total) by mouth once daily.    clopidogreL (PLAVIX) 75 mg tablet Take 1 tablet (75 mg total) by mouth once daily.    EScitalopram oxalate (LEXAPRO) 20 MG tablet Take 1 tablet (20 mg total) by mouth once daily.    fenofibrate (TRICOR) 54 MG tablet Take 1 tablet (54 mg total) by mouth once daily.     lisinopriL (PRINIVIL,ZESTRIL) 2.5 MG tablet Take 1 tablet (2.5 mg total) by mouth once daily.    metoprolol succinate (TOPROL-XL) 100 MG 24 hr tablet Take 1 tablet (100 mg total) by mouth once daily.    nicotine (NICODERM CQ) 14 mg/24 hr Place 1 patch onto the skin once daily.    pantoprazole (PROTONIX) 40 MG tablet Take 1 tablet (40 mg total) by mouth once daily.    traZODone (DESYREL) 100 MG tablet TAKE ONE TABLET BY MOUTH AT BEDTIME    triamcinolone acetonide 0.025% (KENALOG) 0.025 % cream APPLY TO AFFECTED AREA TWO TIMES A DAY     No current facility-administered medications on file prior to visit.       Objective:   Objective:  Wt Readings from Last 3 Encounters:   02/15/23 95 kg (209 lb 7 oz)   09/21/22 101.6 kg (224 lb)   09/21/22 95.6 kg (210 lb 12.2 oz)     Temp Readings from Last 3 Encounters:   04/14/22 97.2 °F (36.2 °C) (Temporal)   04/04/22 98.7 °F (37.1 °C) (Oral)   03/14/22 98.6 °F (37 °C) (Temporal)     BP Readings from Last 3 Encounters:   02/15/23 127/71   09/21/22 (!) 149/72   09/21/22 130/74     Pulse Readings from Last 3 Encounters:   02/15/23 65   09/21/22 (!) 57   09/21/22 72       Physical Exam  Vitals reviewed.   Constitutional:       Appearance: He is well-developed.   HENT:      Head: Normocephalic and atraumatic.   Eyes:      General: No scleral icterus.     Conjunctiva/sclera: Conjunctivae normal.   Cardiovascular:      Rate and Rhythm: Normal rate and regular rhythm.      Pulses: Intact distal pulses.      Heart sounds: Normal heart sounds. No murmur heard.  Pulmonary:      Effort: No respiratory distress.      Breath sounds: No wheezing or rales.   Chest:      Chest wall: No tenderness.   Abdominal:      General: Bowel sounds are normal. There is no distension.      Palpations: Abdomen is soft.      Tenderness: There is no guarding.   Musculoskeletal:         General: Normal range of motion.      Cervical back: Normal range of motion and neck supple.   Skin:     General: Skin is warm.    Neurological:      Mental Status: He is alert and oriented to person, place, and time.       Lab Results   Component Value Date    CHOL 191 04/03/2022    CHOL 226 (H) 03/14/2022    CHOL 201 (H) 11/21/2019     Lab Results   Component Value Date    HDL 27 (L) 04/03/2022    HDL 27 (L) 03/14/2022    HDL 28 (L) 11/21/2019     Lab Results   Component Value Date    LDLCALC 109.6 04/03/2022    LDLCALC Invalid, Trig>400.0 03/14/2022    LDLCALC Invalid, Trig>400.0 11/21/2019     Lab Results   Component Value Date    TRIG 272 (H) 04/03/2022    TRIG 548 (H) 03/14/2022    TRIG 437 (H) 11/21/2019     Lab Results   Component Value Date    CHOLHDL 14.1 (L) 04/03/2022    CHOLHDL 11.9 (L) 03/14/2022    CHOLHDL 13.9 (L) 11/21/2019       Chemistry        Component Value Date/Time     04/04/2022 0909    K 4.1 04/04/2022 0909     04/04/2022 0909    CO2 21 (L) 04/04/2022 0909    BUN 15 04/04/2022 0909    CREATININE 0.8 04/04/2022 0909     04/04/2022 0909        Component Value Date/Time    CALCIUM 9.9 04/04/2022 0909    ALKPHOS 60 04/02/2022 1846    AST 36 04/02/2022 1846    ALT 30 04/02/2022 1846    BILITOT 0.4 04/02/2022 1846    ESTGFRAFRICA >60 04/04/2022 0909    EGFRNONAA >60 04/04/2022 0909          Lab Results   Component Value Date    TSH 0.866 09/21/2022     Lab Results   Component Value Date    INR 1.1 04/02/2022     Lab Results   Component Value Date    WBC 8.26 04/04/2022    HGB 14.5 04/04/2022    HCT 42.1 04/04/2022    MCV 91 04/04/2022     04/04/2022     BNP  @LABRCNTIP(BNP,BNPTRIAGEBLO)@  CrCl cannot be calculated (Patient's most recent lab result is older than the maximum 7 days allowed.).     Imaging:  ======  Results for orders placed during the hospital encounter of 04/02/22    Echo    Interpretation Summary  · Concentric hypertrophy and moderately decreased systolic function.  · Mild tricuspid regurgitation.  · The estimated ejection fraction is 30%.  · Grade I left ventricular diastolic  dysfunction.  · There are segmental left ventricular wall motion abnormalities.  · With normal right ventricular systolic function.  · Normal central venous pressure (3 mmHg).  · The estimated PA systolic pressure is 28 mmHg.    No results found for this or any previous visit.    No results found for this or any previous visit.    No results found for this or any previous visit.    No results found for this or any previous visit.    No valid procedures specified.    Diagnostic Results:  ECG: Reviewed    Results for orders placed during the hospital encounter of 09/21/22    Echo    Interpretation Summary  · The left ventricle is normal in size with mildly decreased systolic function.  · The estimated ejection fraction is 45%.  · Grade I left ventricular diastolic dysfunction.  · There is mild left ventricular global hypokinesis.  · Normal right ventricular size with normal right ventricular systolic function.  · Mild tricuspid regurgitation.  · Intermediate central venous pressure (8 mmHg).  · The estimated PA systolic pressure is 26 mmHg.      The ASCVD Risk score (Rain CARTER, et al., 2019) failed to calculate for the following reasons:    The patient has a prior MI or stroke diagnosis    Assessment and Plan:   Suspected sleep apnea  Comments:  He reports daytime somnolence, choking sometimes wakes up, non restorative sleep  Cad , htn , obesity  MALLAMPATI 4  Orders:  -     Home Sleep Study; Future    Hypersomnia, unspecified  Comments:  He reports daytime somnolence, choking sometimes wakes up, non restorative sleep  Cad , htn , obesity   Orders:  -     Home Sleep Study; Future    Hyperlipidemia, unspecified hyperlipidemia type    Primary hypertension    JERMAINE (generalized anxiety disorder)    Obesity (BMI 35.0-39.9 without comorbidity)    Tobacco use    Attention deficit hyperactivity disorder (ADHD), unspecified ADHD type    S/P coronary artery stent placement        His echocardiogram Showed mild improvement in his  "EF from 40% to 45%.    Still on toprol 100mg , tried to lower it to 50 mg and fatigue did not improve  One episode of possible svt on his hm but asymptomatic  " The patient's rhythm included 15 hr 25 min 43 sec of tachycardia. The fastest single episode of tachycardia occurred at 7:54:31 AM D1, lasting 13 min 32 sec, with maximum heart rate of 152 BPM. "   Continue with dapt, statin, lisinopril.  Reviewed all tests and above medical conditions with patient in detail and formulated treatment plan.  Risk factor modification discussed.   Cardiac low salt diet discussed.  Maintaining healthy weight and weight loss goals were discussed in clinic.  Counseled on smoking, 5 cig a day still   Follow up in 6 months          "

## 2023-02-15 NOTE — TELEPHONE ENCOUNTER
----- Message from Clark Gutierres sent at 2/15/2023  2:09 PM CST -----  Review Chart[facility] [place] [facility]

## 2023-02-16 ENCOUNTER — TELEPHONE (OUTPATIENT)
Dept: SLEEP MEDICINE | Facility: HOSPITAL | Age: 43
End: 2023-02-16
Payer: MEDICARE

## 2023-04-12 DIAGNOSIS — I10 PRIMARY HYPERTENSION: ICD-10-CM

## 2023-05-19 DIAGNOSIS — F41.1 GAD (GENERALIZED ANXIETY DISORDER): ICD-10-CM

## 2023-05-20 RX ORDER — ESCITALOPRAM OXALATE 20 MG/1
TABLET ORAL
Qty: 90 TABLET | Refills: 0 | Status: SHIPPED | OUTPATIENT
Start: 2023-05-20 | End: 2023-07-19 | Stop reason: SDUPTHER

## 2023-05-20 NOTE — TELEPHONE ENCOUNTER
Refill Routing Note   Medication(s) are not appropriate for processing by [facility] for the following reason(s):      Non-participating provider    [facility] action(s):  Route None identified          Appointments  past 12m or future 3m with PCP    Date Provider   Last Visit   4/14/2022 Luana Hall NP   Next Visit   Visit date not found Luana Hall NP   ED visits in past 90 days: 0        Note composed:9:11 PM 05/19/2023

## 2023-07-07 DIAGNOSIS — E78.5 HYPERLIPIDEMIA, UNSPECIFIED HYPERLIPIDEMIA TYPE: ICD-10-CM

## 2023-07-10 DIAGNOSIS — E78.5 HYPERLIPIDEMIA, UNSPECIFIED HYPERLIPIDEMIA TYPE: ICD-10-CM

## 2023-07-10 RX ORDER — FENOFIBRATE 54 MG/1
54 TABLET ORAL DAILY
Qty: 14 TABLET | Refills: 0 | OUTPATIENT
Start: 2023-07-10 | End: 2023-07-24

## 2023-07-10 RX ORDER — FENOFIBRATE 54 MG/1
TABLET ORAL
Qty: 30 TABLET | Refills: 0 | Status: SHIPPED | OUTPATIENT
Start: 2023-07-10 | End: 2023-07-19

## 2023-07-10 NOTE — TELEPHONE ENCOUNTER
----- Message from Ebony Barnhart sent at 7/10/2023 12:36 PM CDT -----  Contact: self:[phone]  Type:  RX Refill Request    Who Called: Philipp   Refill or New Rx:refill  RX Name and Strength:fenofibrate (TRICOR) 54 MG tablet  How is the patient currently taking it? (ex. 1XDay):1 a day  Is this a 30 day or 90 day RX:30  Preferred Pharmacy with phone number:  [facility] [id number] [facility] [address]  Phone: [phone] Fax: [phone]  Local or Mail Order:local   Ordering Provider:St Chen   Would the patient rather a call back or a response via [facility]? Call back  Best Call Back Number:[phone]   Additional Information: pt been without meds for 1 week

## 2023-07-19 ENCOUNTER — OFFICE VISIT (OUTPATIENT)
Dept: INTERNAL MEDICINE | Facility: CLINIC | Age: 43
End: 2023-07-19
Payer: MEDICARE

## 2023-07-19 ENCOUNTER — HOSPITAL ENCOUNTER (OUTPATIENT)
Dept: RADIOLOGY | Facility: HOSPITAL | Age: 43
Discharge: HOME | End: 2023-07-19
Attending: NURSE PRACTITIONER
Payer: MEDICARE

## 2023-07-19 VITALS
SYSTOLIC BLOOD PRESSURE: 117 MMHG | HEIGHT: 67 IN | HEART RATE: 92 BPM | DIASTOLIC BLOOD PRESSURE: 68 MMHG | WEIGHT: 189.38 LBS | BODY MASS INDEX: 29.72 KG/M2 | OXYGEN SATURATION: 97 % | TEMPERATURE: 98 F

## 2023-07-19 DIAGNOSIS — Z12.11 COLON CANCER SCREENING: ICD-10-CM

## 2023-07-19 DIAGNOSIS — E66.3 OVERWEIGHT WITH BODY MASS INDEX (BMI) OF 29 TO 29.9 IN ADULT: ICD-10-CM

## 2023-07-19 DIAGNOSIS — F41.1 GAD (GENERALIZED ANXIETY DISORDER): ICD-10-CM

## 2023-07-19 DIAGNOSIS — Z72.0 TOBACCO USE: Chronic | ICD-10-CM

## 2023-07-19 DIAGNOSIS — M25.512 ACUTE PAIN OF LEFT SHOULDER: ICD-10-CM

## 2023-07-19 DIAGNOSIS — B34.9 ACUTE VIRAL SYNDROME: ICD-10-CM

## 2023-07-19 DIAGNOSIS — I10 PRIMARY HYPERTENSION: Primary | Chronic | ICD-10-CM

## 2023-07-19 DIAGNOSIS — I25.2 HISTORY OF NON-ST ELEVATION MYOCARDIAL INFARCTION (NSTEMI): ICD-10-CM

## 2023-07-19 DIAGNOSIS — E78.5 HYPERLIPIDEMIA, UNSPECIFIED HYPERLIPIDEMIA TYPE: ICD-10-CM

## 2023-07-19 DIAGNOSIS — R73.9 HYPERGLYCEMIA, UNSPECIFIED: ICD-10-CM

## 2023-07-19 DIAGNOSIS — L30.9 DERMATITIS: ICD-10-CM

## 2023-07-19 DIAGNOSIS — R63.1 POLYDIPSIA: ICD-10-CM

## 2023-07-19 LAB
CTP QC/QA: YES
SARS-COV-2 RDRP RESP QL NAA+PROBE: NEGATIVE

## 2023-07-19 PROCEDURE — 73030 X-RAY EXAM OF SHOULDER: CPT | Mod: TC,PO,LT

## 2023-07-19 PROCEDURE — 73030 X-RAY EXAM OF SHOULDER: CPT | Mod: 26,LT,, | Performed by: RADIOLOGY

## 2023-07-19 PROCEDURE — 1160F PR REVIEW ALL MEDS BY PRESCRIBER/CLIN PHARMACIST DOCUMENTED: ICD-10-PCS | Mod: CPTII,S$GLB,, | Performed by: NURSE PRACTITIONER

## 2023-07-19 PROCEDURE — 1159F MED LIST DOCD IN RCRD: CPT | Mod: CPTII,S$GLB,, | Performed by: NURSE PRACTITIONER

## 2023-07-19 PROCEDURE — 1160F RVW MEDS BY RX/DR IN RCRD: CPT | Mod: CPTII,S$GLB,, | Performed by: NURSE PRACTITIONER

## 2023-07-19 PROCEDURE — 87635: ICD-10-PCS | Mod: QW,S$GLB,, | Performed by: NURSE PRACTITIONER

## 2023-07-19 PROCEDURE — 3074F SYST BP LT 130 MM HG: CPT | Mod: CPTII,S$GLB,, | Performed by: NURSE PRACTITIONER

## 2023-07-19 PROCEDURE — 87635 SARS-COV-2 COVID-19 AMP PRB: CPT | Mod: QW,S$GLB,, | Performed by: NURSE PRACTITIONER

## 2023-07-19 PROCEDURE — 73030 XR SHOULDER COMPLETE 2 OR MORE VIEWS LEFT: ICD-10-PCS | Mod: 26,LT,, | Performed by: RADIOLOGY

## 2023-07-19 PROCEDURE — 99214 PR OFFICE/OUTPT VISIT, EST, LEVL IV, 30-39 MIN: ICD-10-PCS | Mod: S$GLB,,, | Performed by: NURSE PRACTITIONER

## 2023-07-19 PROCEDURE — 3008F PR BODY MASS INDEX (BMI) DOCUMENTED: ICD-10-PCS | Mod: CPTII,S$GLB,, | Performed by: NURSE PRACTITIONER

## 2023-07-19 PROCEDURE — 99214 OFFICE O/P EST MOD 30 MIN: CPT | Mod: S$GLB,,, | Performed by: NURSE PRACTITIONER

## 2023-07-19 PROCEDURE — 3078F DIAST BP <80 MM HG: CPT | Mod: CPTII,S$GLB,, | Performed by: NURSE PRACTITIONER

## 2023-07-19 PROCEDURE — 3044F HG A1C LEVEL LT 7.0%: CPT | Mod: CPTII,S$GLB,, | Performed by: NURSE PRACTITIONER

## 2023-07-19 PROCEDURE — 1159F PR MEDICATION LIST DOCUMENTED IN MEDICAL RECORD: ICD-10-PCS | Mod: CPTII,S$GLB,, | Performed by: NURSE PRACTITIONER

## 2023-07-19 PROCEDURE — 99999 PR PBB SHADOW E&M-EST. PATIENT-LVL IV: CPT | Mod: PBBFAC,,, | Performed by: NURSE PRACTITIONER

## 2023-07-19 PROCEDURE — 3078F PR MOST RECENT DIASTOLIC BLOOD PRESSURE < 80 MM HG: ICD-10-PCS | Mod: CPTII,S$GLB,, | Performed by: NURSE PRACTITIONER

## 2023-07-19 PROCEDURE — 3044F PR MOST RECENT HEMOGLOBIN A1C LEVEL <7.0%: ICD-10-PCS | Mod: CPTII,S$GLB,, | Performed by: NURSE PRACTITIONER

## 2023-07-19 PROCEDURE — 99999 PR PBB SHADOW E&M-EST. PATIENT-LVL IV: ICD-10-PCS | Mod: PBBFAC,,, | Performed by: NURSE PRACTITIONER

## 2023-07-19 PROCEDURE — 4010F ACE/ARB THERAPY RXD/TAKEN: CPT | Mod: CPTII,S$GLB,, | Performed by: NURSE PRACTITIONER

## 2023-07-19 PROCEDURE — 3074F PR MOST RECENT SYSTOLIC BLOOD PRESSURE < 130 MM HG: ICD-10-PCS | Mod: CPTII,S$GLB,, | Performed by: NURSE PRACTITIONER

## 2023-07-19 PROCEDURE — 3008F BODY MASS INDEX DOCD: CPT | Mod: CPTII,S$GLB,, | Performed by: NURSE PRACTITIONER

## 2023-07-19 PROCEDURE — 4010F PR ACE/ARB THEARPY RXD/TAKEN: ICD-10-PCS | Mod: CPTII,S$GLB,, | Performed by: NURSE PRACTITIONER

## 2023-07-19 RX ORDER — METOPROLOL SUCCINATE 100 MG/1
100 TABLET, EXTENDED RELEASE ORAL DAILY
Qty: 90 TABLET | Refills: 3 | Status: SHIPPED | OUTPATIENT
Start: 2023-07-19 | End: 2024-07-18

## 2023-07-19 RX ORDER — ESCITALOPRAM OXALATE 20 MG/1
20 TABLET ORAL DAILY
Qty: 90 TABLET | Refills: 3 | Status: SHIPPED | OUTPATIENT
Start: 2023-07-19

## 2023-07-19 RX ORDER — TRIAMCINOLONE ACETONIDE 0.25 MG/G
CREAM TOPICAL
Qty: 15 G | Refills: 1 | Status: SHIPPED | OUTPATIENT
Start: 2023-07-19 | End: 2023-12-08 | Stop reason: SDUPTHER

## 2023-07-19 RX ORDER — FENOFIBRATE 54 MG/1
54 TABLET ORAL DAILY
Qty: 90 TABLET | Refills: 3 | Status: SHIPPED | OUTPATIENT
Start: 2023-07-19

## 2023-07-19 NOTE — ASSESSMENT & PLAN NOTE
Counseled on the importance of smoke cessation in order to improve overall quality of life and reduce risk of future comorbidities. Declined smoke cessation program due to transportation. Working on quitting at home.

## 2023-07-19 NOTE — PROGRESS NOTES
Subjective:       Patient ID: Philipp Gonzales is a 43 y.o. male.    Chief Complaint: No chief complaint on file.    Mr. Gonzales presents a visit for medication refill and hypertension follow-up.  Due for routine labs.  Complaining of left shoulder pain that has been going on for approximately 2 weeks.  Reports decreased range of motion.  Denies any injury or heavy lifting.  Pain rated at 2/10 at this time.  Reports pain is worse with abduction of left arm.    Patient also complaining of nausea, general body aches, and chills. His children recently had similar symptoms. His symptoms started yesterday.     Hypertension  This is a chronic problem. The current episode started more than 1 year ago. The problem is controlled. Pertinent negatives include no anxiety, blurred vision, chest pain, headaches, malaise/fatigue, palpitations, peripheral edema or shortness of breath. There are no associated agents to hypertension. Risk factors for coronary artery disease include male gender, dyslipidemia and smoking/tobacco exposure. Past treatments include angiotensin blockers and beta blockers. The current treatment provides significant improvement. Compliance problems include exercise.  Hypertensive end-organ damage includes CAD/MI. There is no history of angina, kidney disease, CVA, heart failure, left ventricular hypertrophy, PVD or retinopathy.   Patient Active Problem List   Diagnosis    Dental caries    JERMAINE (generalized anxiety disorder)    Attention deficit hyperactivity disorder    Gastroesophageal reflux disease without esophagitis    Hyperlipidemia    Overweight with body mass index (BMI) of 29 to 29.9 in adult    Acute right-sided low back pain with right-sided sciatica    Pain, dental    Tobacco use    Chronic pain of right knee    ACS (acute coronary syndrome)    Primary hypertension    History of non-ST elevation myocardial infarction (NSTEMI)       Family History   Problem Relation Age of Onset    Macular  degeneration Mother     Macular degeneration Maternal Grandfather     Hyperlipidemia Father     Diabetes Father     Heart attacks under age 50 Paternal Grandmother      Past Surgical History:   Procedure Laterality Date    CORONARY STENT PLACEMENT N/A 4/3/2022    Procedure: INSERTION, STENT, CORONARY ARTERY;  Surgeon: Eliud Cristina MD;  Location: [facility] CATH LAB;  Service: Cardiology;  Laterality: N/A;    HIP SURGERY      as child left    LEFT HEART CATHETERIZATION Left 4/3/2022    Procedure: CATHETERIZATION, HEART, LEFT;  Surgeon: Eliud Cristina MD;  Location: [facility] CATH LAB;  Service: Cardiology;  Laterality: Left;         Current Outpatient Medications:     aspirin (ECOTRIN) 81 MG EC tablet, TAKE ONE TABLET BY MOUTH EVERY DAY, Disp: 90 tablet, Rfl: 0    atorvastatin (LIPITOR) 80 MG tablet, TAKE 1 TABLET BY MOUTH EVERY DAY, Disp: 90 tablet, Rfl: 3    clopidogreL (PLAVIX) 75 mg tablet, TAKE 1 TABLET BY MOUTH EVERY DAY, Disp: 90 tablet, Rfl: 3    lisinopriL (PRINIVIL,ZESTRIL) 2.5 MG tablet, TAKE 1 TABLET BY MOUTH EVERY DAY, Disp: 90 tablet, Rfl: 3    traZODone (DESYREL) 100 MG tablet, TAKE ONE TABLET BY MOUTH AT BEDTIME, Disp: 90 tablet, Rfl: 2    EScitalopram oxalate (LEXAPRO) 20 MG tablet, Take 1 tablet (20 mg total) by mouth once daily., Disp: 90 tablet, Rfl: 3    fenofibrate (TRICOR) 54 MG tablet, Take 1 tablet (54 mg total) by mouth once daily., Disp: 90 tablet, Rfl: 3    metoprolol succinate (TOPROL-XL) 100 MG 24 hr tablet, Take 1 tablet (100 mg total) by mouth once daily., Disp: 90 tablet, Rfl: 3    triamcinolone acetonide 0.025% (KENALOG) 0.025 % cream, APPLY TO AFFECTED AREA TWO TIMES A DAY, Disp: 15 g, Rfl: 1    Review of Systems   Constitutional:  Positive for chills and fatigue. Negative for activity change, appetite change, diaphoresis, fever and malaise/fatigue.   HENT:  Negative for congestion, postnasal drip, rhinorrhea, sinus pressure, sinus pain, sneezing, sore throat, tinnitus and trouble  "swallowing.    Eyes:  Negative for blurred vision.   Respiratory:  Positive for cough. Negative for shortness of breath.    Cardiovascular:  Negative for chest pain and palpitations.   Gastrointestinal:  Positive for nausea. Negative for abdominal pain, constipation, diarrhea and vomiting.   Genitourinary:  Negative for dysuria and frequency.   Musculoskeletal:  Positive for arthralgias. Negative for gait problem.   Neurological:  Negative for dizziness, light-headedness and headaches.   Psychiatric/Behavioral:  Negative for dysphoric mood. The patient is nervous/anxious.      Objective:   /68 (BP Location: Left arm, Patient Position: Sitting, BP Method: Medium (Automatic))   Pulse 92   Temp 98.1 °F (36.7 °C) (Temporal)   Ht 5' 7" (1.702 m)   Wt 85.9 kg (189 lb 6 oz)   SpO2 97%   BMI 29.66 kg/m²      Physical Exam  Vitals reviewed.   Constitutional:       General: He is not in acute distress.     Appearance: He is well-developed. He is not diaphoretic.   HENT:      Head: Normocephalic.   Cardiovascular:      Rate and Rhythm: Normal rate and regular rhythm.      Pulses: Normal pulses.      Heart sounds: Normal heart sounds. No murmur heard.    No friction rub. No gallop.   Pulmonary:      Effort: Pulmonary effort is normal. No respiratory distress.      Breath sounds: Normal breath sounds. No rales.   Musculoskeletal:      Right shoulder: No tenderness or bony tenderness. Normal range of motion.      Left shoulder: Tenderness present. Decreased range of motion.      Cervical back: Normal range of motion and neck supple.   Skin:     General: Skin is warm and dry.      Capillary Refill: Capillary refill takes less than 2 seconds.      Coloration: Skin is not pale.      Findings: No erythema.   Neurological:      Mental Status: He is alert and oriented to person, place, and time.       Assessment & Plan     Problem List Items Addressed This Visit          Psychiatric    JERMAINE (generalized anxiety disorder)    " Current Assessment & Plan     Stable on lexapro.          Relevant Medications    EScitalopram oxalate (LEXAPRO) 20 MG tablet       Cardiac/Vascular    Hyperlipidemia (Chronic)    Current Assessment & Plan     On statin and fenofibrate.  Labs today.         Relevant Medications    fenofibrate (TRICOR) 54 MG tablet    Other Relevant Orders    Lipid Panel (Completed)    Primary hypertension - Primary (Chronic)    Current Assessment & Plan     Blood pressure stable. Continue current medications.          Relevant Orders    COMPREHENSIVE METABOLIC PANEL (Completed)    History of non-ST elevation myocardial infarction (NSTEMI)    Current Assessment & Plan     Followed by cardiology.  Asymptomatic.  Continue Plavix, ASA, and metoprolol.         Relevant Medications    metoprolol succinate (TOPROL-XL) 100 MG 24 hr tablet       Endocrine    Overweight with body mass index (BMI) of 29 to 29.9 in adult    Current Assessment & Plan     Counseled on importance of diet and exercise in order to improve overall quality of life, and reduce risk of future comorbidities.              Other    Tobacco use (Chronic)    Current Assessment & Plan     Counseled on the importance of smoke cessation in order to improve overall quality of life and reduce risk of future comorbidities. Declined smoke cessation program due to transportation. Working on quitting at home.              Other Visit Diagnoses       Acute viral syndrome        Relevant Orders    POCT COVID-19 Rapid Screening (Completed)    Dermatitis        Relevant Medications    triamcinolone acetonide 0.025% (KENALOG) 0.025 % cream    Acute pain of left shoulder        Relevant Orders    X-Ray Shoulder 2 or More Views Left (Completed)    Colon cancer screening        Relevant Orders    Ambulatory referral/consult to Endo Procedure     Polydipsia        Relevant Orders    Hemoglobin A1C (Completed)    Hyperglycemia, unspecified        Relevant Orders    Hemoglobin A1C  "(Completed)            Follow up in about 6 months (around 1/19/2024), or if symptoms worsen or fail to improve, for hypertension.          Portions of this note may have been created with voice recognition software. Occasional "wrong-word" or "sound-a-like" substitutions may have occurred due to the inherent limitations of voice recognition software. Please, read the note carefully and recognize, using context, where substitutions have occurred.       "

## 2023-07-20 ENCOUNTER — TELEPHONE (OUTPATIENT)
Dept: INTERNAL MEDICINE | Facility: CLINIC | Age: 43
End: 2023-07-20
Payer: MEDICARE

## 2023-07-20 DIAGNOSIS — E87.5 HYPERKALEMIA: Primary | ICD-10-CM

## 2023-07-20 NOTE — TELEPHONE ENCOUNTER
----- Message from No Kline sent at 7/20/2023  9:22 AM CDT -----  Contact: Philipp Costello was returning the phone call. Please call him back at [phone].    Thanks  TS

## 2023-07-26 ENCOUNTER — LAB VISIT (OUTPATIENT)
Dept: LAB | Facility: HOSPITAL | Age: 43
End: 2023-07-26
Attending: NURSE PRACTITIONER
Payer: MEDICARE

## 2023-07-26 DIAGNOSIS — E87.5 HYPERKALEMIA: ICD-10-CM

## 2023-07-26 LAB — POTASSIUM SERPL-SCNC: 5.1 MMOL/L (ref 3.5–5.1)

## 2023-07-26 PROCEDURE — 36415 COLL VENOUS BLD VENIPUNCTURE: CPT | Mod: PO | Performed by: NURSE PRACTITIONER

## 2023-07-26 PROCEDURE — 84132 ASSAY OF SERUM POTASSIUM: CPT | Mod: PO | Performed by: NURSE PRACTITIONER

## 2023-07-28 ENCOUNTER — HOSPITAL ENCOUNTER (OUTPATIENT)
Dept: PREADMISSION TESTING | Facility: HOSPITAL | Age: 43
Discharge: HOME | End: 2023-07-28
Attending: INTERNAL MEDICINE
Payer: MEDICARE

## 2023-07-28 DIAGNOSIS — Z12.11 COLON CANCER SCREENING: ICD-10-CM

## 2023-08-05 NOTE — ED NOTES
Pt stable, in NAD, and states no further needs at this time. Pt to be d/c'd home.   [FreeTextEntry1] : CPE [de-identified] : Pt reports feeling well today.

## 2023-11-15 ENCOUNTER — HOSPITAL ENCOUNTER (EMERGENCY)
Facility: HOSPITAL | Age: 43
Discharge: HOME | End: 2023-11-15
Attending: EMERGENCY MEDICINE
Payer: MEDICARE

## 2023-11-15 VITALS
BODY MASS INDEX: 30.02 KG/M2 | WEIGHT: 191.25 LBS | HEIGHT: 67 IN | DIASTOLIC BLOOD PRESSURE: 58 MMHG | HEART RATE: 65 BPM | RESPIRATION RATE: 20 BRPM | SYSTOLIC BLOOD PRESSURE: 124 MMHG | OXYGEN SATURATION: 99 % | TEMPERATURE: 98 F

## 2023-11-15 DIAGNOSIS — L02.415 ABSCESS OF RIGHT KNEE: Primary | ICD-10-CM

## 2023-11-15 PROCEDURE — 99283 EMERGENCY DEPT VISIT LOW MDM: CPT | Mod: ER

## 2023-11-15 RX ORDER — SULFAMETHOXAZOLE AND TRIMETHOPRIM 800; 160 MG/1; MG/1
1 TABLET ORAL 2 TIMES DAILY
Qty: 14 TABLET | Refills: 0 | Status: SHIPPED | OUTPATIENT
Start: 2023-11-15 | End: 2023-11-22

## 2023-11-15 NOTE — ED PROVIDER NOTES
Encounter Date: 11/15/2023       History     Chief Complaint   Patient presents with    Knee Problem     R knee pain, swelling, discolored, pt states that he think its infected, onset as bump prior to pt popping it      43-year-old male with complaint of right knee pain and swelling over the past couple days.  Patient reports he noticed a red bump on the top of his right knee that is worsened over the past day.  Patient does report mild drainage of pus.        Review of patient's allergies indicates:  No Known Allergies  Past Medical History:   Diagnosis Date    Anxiety     High cholesterol     Hyperlipidemia     Insomnia     Legally blind     Macular degeneration      Past Surgical History:   Procedure Laterality Date    CORONARY STENT PLACEMENT N/A 4/3/2022    Procedure: INSERTION, STENT, CORONARY ARTERY;  Surgeon: Eliud Cristina MD;  Location: [facility] CATH LAB;  Service: Cardiology;  Laterality: N/A;    HIP SURGERY      as child left    LEFT HEART CATHETERIZATION Left 4/3/2022    Procedure: CATHETERIZATION, HEART, LEFT;  Surgeon: Eliud Cristina MD;  Location: [facility] CATH LAB;  Service: Cardiology;  Laterality: Left;     Family History   Problem Relation Age of Onset    Macular degeneration Mother     Macular degeneration Maternal Grandfather     Hyperlipidemia Father     Diabetes Father     Heart attacks under age 50 Paternal Grandmother      Social History     Tobacco Use    Smoking status: Every Day     Current packs/day: 1.00     Types: Cigarettes    Smokeless tobacco: Never   Substance Use Topics    Alcohol use: Yes     Comment: rarely    Drug use: No     Review of Systems   Constitutional:  Negative for fever.   HENT:  Negative for sore throat.    Respiratory:  Negative for shortness of breath.    Cardiovascular:  Negative for chest pain.   Gastrointestinal:  Negative for nausea.   Genitourinary:  Negative for dysuria.   Musculoskeletal:  Negative for back pain.   Skin:  Positive for wound. Negative for  rash.   Neurological:  Negative for weakness.   Hematological:  Does not bruise/bleed easily.       Physical Exam     Initial Vitals [11/15/23 1151]   BP Pulse Resp Temp SpO2   (!) 124/58 65 20 98.4 °F (36.9 °C) 99 %      MAP       --         Physical Exam    Nursing note and vitals reviewed.  Constitutional: He appears well-developed and well-nourished.   HENT:   Head: Normocephalic and atraumatic.   Eyes: Conjunctivae are normal. Pupils are equal, round, and reactive to light.   Neck: Neck supple.   Normal range of motion.  Cardiovascular:  Normal rate, regular rhythm, normal heart sounds and intact distal pulses.           Pulmonary/Chest: Breath sounds normal.   Abdominal: Abdomen is soft. There is no rebound and no guarding.   Musculoskeletal:         General: Normal range of motion.      Cervical back: Normal range of motion and neck supple.     Neurological: He is alert.   Skin: Skin is warm and dry.   2-3 cm abscess above right knee that is draining pus   Psychiatric: He has a normal mood and affect. His behavior is normal. Thought content normal.         ED Course   Procedures  Labs Reviewed   HIV 1 / 2 ANTIBODY   HEPATITIS C ANTIBODY   HEP C VIRUS HOLD SPECIMEN          Imaging Results    None          Medications - No data to display  Medical Decision Making  Risk  Prescription drug management.                               Clinical Impression:   Final diagnoses:  [L02.415] Abscess of right knee (Primary)        ED Disposition Condition    Discharge Stable          ED Prescriptions       Medication Sig Dispense Start Date End Date Auth. Provider    sulfamethoxazole-trimethoprim 800-160mg (BACTRIM DS) 800-160 mg Tab Take 1 tablet by mouth 2 (two) times daily. for 7 days 14 tablet 11/15/2023 11/22/2023 Arnulfo Cast NP          Follow-up Information       Follow up With Specialties Details Why Contact Info    Luana Hall NP Family Medicine Schedule an appointment as soon as possible for a visit  in 2 days  [id number] [address]  [place] [address]  [phone]               Arnulfo Cast, ALLI  11/15/23 1238       Arnulfo Cast NP  11/15/23 1239

## 2023-12-08 ENCOUNTER — OFFICE VISIT (OUTPATIENT)
Dept: INTERNAL MEDICINE | Facility: CLINIC | Age: 43
End: 2023-12-08
Payer: MEDICARE

## 2023-12-08 VITALS
OXYGEN SATURATION: 95 % | WEIGHT: 189.63 LBS | BODY MASS INDEX: 29.76 KG/M2 | TEMPERATURE: 98 F | HEART RATE: 94 BPM | DIASTOLIC BLOOD PRESSURE: 80 MMHG | RESPIRATION RATE: 18 BRPM | SYSTOLIC BLOOD PRESSURE: 134 MMHG | HEIGHT: 67 IN

## 2023-12-08 DIAGNOSIS — L30.9 DERMATITIS: ICD-10-CM

## 2023-12-08 DIAGNOSIS — I10 PRIMARY HYPERTENSION: Primary | Chronic | ICD-10-CM

## 2023-12-08 DIAGNOSIS — Z12.11 COLON CANCER SCREENING: ICD-10-CM

## 2023-12-08 DIAGNOSIS — Z23 NEED FOR VACCINATION: ICD-10-CM

## 2023-12-08 DIAGNOSIS — E66.3 OVERWEIGHT WITH BODY MASS INDEX (BMI) OF 29 TO 29.9 IN ADULT: ICD-10-CM

## 2023-12-08 DIAGNOSIS — E78.5 HYPERLIPIDEMIA, UNSPECIFIED HYPERLIPIDEMIA TYPE: ICD-10-CM

## 2023-12-08 DIAGNOSIS — Z72.0 TOBACCO USE: Chronic | ICD-10-CM

## 2023-12-08 DIAGNOSIS — Z23 NEED FOR PNEUMOCOCCAL VACCINATION: ICD-10-CM

## 2023-12-08 DIAGNOSIS — I25.2 HISTORY OF NON-ST ELEVATION MYOCARDIAL INFARCTION (NSTEMI): ICD-10-CM

## 2023-12-08 DIAGNOSIS — F41.1 GAD (GENERALIZED ANXIETY DISORDER): ICD-10-CM

## 2023-12-08 PROCEDURE — G0008 FLU VACCINE (QUAD) GREATER THAN OR EQUAL TO 3YO PRESERVATIVE FREE IM: ICD-10-PCS | Mod: S$GLB,,, | Performed by: NURSE PRACTITIONER

## 2023-12-08 PROCEDURE — G0009 PNEUMOCOCCAL CONJUGATE VACCINE 20-VALENT: ICD-10-PCS | Mod: S$GLB,,, | Performed by: NURSE PRACTITIONER

## 2023-12-08 PROCEDURE — 1159F MED LIST DOCD IN RCRD: CPT | Mod: CPTII,S$GLB,, | Performed by: NURSE PRACTITIONER

## 2023-12-08 PROCEDURE — 4010F ACE/ARB THERAPY RXD/TAKEN: CPT | Mod: CPTII,S$GLB,, | Performed by: NURSE PRACTITIONER

## 2023-12-08 PROCEDURE — 3008F BODY MASS INDEX DOCD: CPT | Mod: CPTII,S$GLB,, | Performed by: NURSE PRACTITIONER

## 2023-12-08 PROCEDURE — G0008 ADMIN INFLUENZA VIRUS VAC: HCPCS | Mod: S$GLB,,, | Performed by: NURSE PRACTITIONER

## 2023-12-08 PROCEDURE — 90686 IIV4 VACC NO PRSV 0.5 ML IM: CPT | Mod: S$GLB,,, | Performed by: NURSE PRACTITIONER

## 2023-12-08 PROCEDURE — 3044F HG A1C LEVEL LT 7.0%: CPT | Mod: CPTII,S$GLB,, | Performed by: NURSE PRACTITIONER

## 2023-12-08 PROCEDURE — 99999 PR PBB SHADOW E&M-EST. PATIENT-LVL IV: CPT | Mod: PBBFAC,,, | Performed by: NURSE PRACTITIONER

## 2023-12-08 PROCEDURE — G0009 ADMIN PNEUMOCOCCAL VACCINE: HCPCS | Mod: S$GLB,,, | Performed by: NURSE PRACTITIONER

## 2023-12-08 PROCEDURE — 3044F PR MOST RECENT HEMOGLOBIN A1C LEVEL <7.0%: ICD-10-PCS | Mod: CPTII,S$GLB,, | Performed by: NURSE PRACTITIONER

## 2023-12-08 PROCEDURE — 90686 FLU VACCINE (QUAD) GREATER THAN OR EQUAL TO 3YO PRESERVATIVE FREE IM: ICD-10-PCS | Mod: S$GLB,,, | Performed by: NURSE PRACTITIONER

## 2023-12-08 PROCEDURE — 99214 PR OFFICE/OUTPT VISIT, EST, LEVL IV, 30-39 MIN: ICD-10-PCS | Mod: S$GLB,,, | Performed by: NURSE PRACTITIONER

## 2023-12-08 PROCEDURE — 3008F PR BODY MASS INDEX (BMI) DOCUMENTED: ICD-10-PCS | Mod: CPTII,S$GLB,, | Performed by: NURSE PRACTITIONER

## 2023-12-08 PROCEDURE — 3079F DIAST BP 80-89 MM HG: CPT | Mod: CPTII,S$GLB,, | Performed by: NURSE PRACTITIONER

## 2023-12-08 PROCEDURE — 99999 PR PBB SHADOW E&M-EST. PATIENT-LVL IV: ICD-10-PCS | Mod: PBBFAC,,, | Performed by: NURSE PRACTITIONER

## 2023-12-08 PROCEDURE — 3075F SYST BP GE 130 - 139MM HG: CPT | Mod: CPTII,S$GLB,, | Performed by: NURSE PRACTITIONER

## 2023-12-08 PROCEDURE — 3079F PR MOST RECENT DIASTOLIC BLOOD PRESSURE 80-89 MM HG: ICD-10-PCS | Mod: CPTII,S$GLB,, | Performed by: NURSE PRACTITIONER

## 2023-12-08 PROCEDURE — 1159F PR MEDICATION LIST DOCUMENTED IN MEDICAL RECORD: ICD-10-PCS | Mod: CPTII,S$GLB,, | Performed by: NURSE PRACTITIONER

## 2023-12-08 PROCEDURE — 4010F PR ACE/ARB THEARPY RXD/TAKEN: ICD-10-PCS | Mod: CPTII,S$GLB,, | Performed by: NURSE PRACTITIONER

## 2023-12-08 PROCEDURE — 3075F PR MOST RECENT SYSTOLIC BLOOD PRESS GE 130-139MM HG: ICD-10-PCS | Mod: CPTII,S$GLB,, | Performed by: NURSE PRACTITIONER

## 2023-12-08 PROCEDURE — 90677 PNEUMOCOCCAL CONJUGATE VACCINE 20-VALENT: ICD-10-PCS | Mod: S$GLB,,, | Performed by: NURSE PRACTITIONER

## 2023-12-08 PROCEDURE — 90677 PCV20 VACCINE IM: CPT | Mod: S$GLB,,, | Performed by: NURSE PRACTITIONER

## 2023-12-08 PROCEDURE — 99214 OFFICE O/P EST MOD 30 MIN: CPT | Mod: S$GLB,,, | Performed by: NURSE PRACTITIONER

## 2023-12-08 RX ORDER — ATORVASTATIN CALCIUM 80 MG/1
80 TABLET, FILM COATED ORAL DAILY
Qty: 90 TABLET | Refills: 3 | Status: CANCELLED | OUTPATIENT
Start: 2023-12-08

## 2023-12-08 RX ORDER — LISINOPRIL 2.5 MG/1
2.5 TABLET ORAL DAILY
Qty: 90 TABLET | Refills: 3 | Status: CANCELLED | OUTPATIENT
Start: 2023-12-08

## 2023-12-08 RX ORDER — METOPROLOL SUCCINATE 100 MG/1
100 TABLET, EXTENDED RELEASE ORAL DAILY
Qty: 90 TABLET | Refills: 3 | Status: CANCELLED | OUTPATIENT
Start: 2023-12-08 | End: 2024-12-07

## 2023-12-08 RX ORDER — TRIAMCINOLONE ACETONIDE 0.25 MG/G
CREAM TOPICAL
Qty: 15 G | Refills: 1 | Status: SHIPPED | OUTPATIENT
Start: 2023-12-08

## 2023-12-08 RX ORDER — ESCITALOPRAM OXALATE 20 MG/1
20 TABLET ORAL DAILY
Qty: 90 TABLET | Refills: 3 | Status: CANCELLED | OUTPATIENT
Start: 2023-12-08

## 2023-12-08 RX ORDER — ASPIRIN 81 MG/1
81 TABLET ORAL DAILY
Qty: 90 TABLET | Refills: 3 | Status: SHIPPED | OUTPATIENT
Start: 2023-12-08

## 2023-12-08 RX ORDER — FENOFIBRATE 54 MG/1
54 TABLET ORAL DAILY
Qty: 90 TABLET | Refills: 3 | Status: CANCELLED | OUTPATIENT
Start: 2023-12-08

## 2023-12-08 RX ORDER — TRAZODONE HYDROCHLORIDE 100 MG/1
100 TABLET ORAL NIGHTLY
Qty: 90 TABLET | Refills: 2 | Status: CANCELLED | OUTPATIENT
Start: 2023-12-08

## 2023-12-08 RX ORDER — CLOPIDOGREL BISULFATE 75 MG/1
75 TABLET ORAL DAILY
Qty: 90 TABLET | Refills: 3 | Status: CANCELLED | OUTPATIENT
Start: 2023-12-08

## 2023-12-08 NOTE — PROGRESS NOTES
Subjective:       Patient ID: Philipp Gonzaels is a 43 y.o. male.    Chief Complaint: Annual Exam    Mr. Gonzales Presents a visit for routine hypertension follow-up.  He has no acute complaints today.  Received notification and mail that he is due for annual wellness visit, but discussed with patient that it is actually the Medicare wellness visit which is a specialized appointment visit that will have to be scheduled on a designated appt time to allow for appropriate time. Would like to get flu and pneumonia vaccine today.     Hypertension  This is a chronic problem. The current episode started more than 1 year ago. The problem is controlled. Associated symptoms include blurred vision (chronic). Pertinent negatives include no anxiety, chest pain, headaches, malaise/fatigue, palpitations, peripheral edema or shortness of breath. There are no associated agents to hypertension. Risk factors for coronary artery disease include dyslipidemia, male gender and smoking/tobacco exposure. Past treatments include angiotensin blockers and beta blockers. The current treatment provides significant improvement. There are no compliance problems.  Hypertensive end-organ damage includes CAD/MI. There is no history of angina, kidney disease, CVA, heart failure, left ventricular hypertrophy or PVD.       Patient Active Problem List   Diagnosis    Dental caries    JERMAINE (generalized anxiety disorder)    Attention deficit hyperactivity disorder    Gastroesophageal reflux disease without esophagitis    Hyperlipidemia    Overweight with body mass index (BMI) of 29 to 29.9 in adult    Acute right-sided low back pain with right-sided sciatica    Pain, dental    Tobacco use    Chronic pain of right knee    ACS (acute coronary syndrome)    Primary hypertension    History of non-ST elevation myocardial infarction (NSTEMI)       Family History   Problem Relation Age of Onset    Macular degeneration Mother     Macular degeneration Maternal Grandfather      Hyperlipidemia Father     Diabetes Father     Heart attacks under age 50 Paternal Grandmother      Past Surgical History:   Procedure Laterality Date    CORONARY STENT PLACEMENT N/A 4/3/2022    Procedure: INSERTION, STENT, CORONARY ARTERY;  Surgeon: Eliud Cristina MD;  Location: [facility] CATH LAB;  Service: Cardiology;  Laterality: N/A;    HIP SURGERY      as child left    LEFT HEART CATHETERIZATION Left 4/3/2022    Procedure: CATHETERIZATION, HEART, LEFT;  Surgeon: Eliud Cristina MD;  Location: [facility] CATH LAB;  Service: Cardiology;  Laterality: Left;         Current Outpatient Medications:     atorvastatin (LIPITOR) 80 MG tablet, TAKE 1 TABLET BY MOUTH EVERY DAY, Disp: 90 tablet, Rfl: 3    clopidogreL (PLAVIX) 75 mg tablet, TAKE 1 TABLET BY MOUTH EVERY DAY, Disp: 90 tablet, Rfl: 3    EScitalopram oxalate (LEXAPRO) 20 MG tablet, Take 1 tablet (20 mg total) by mouth once daily., Disp: 90 tablet, Rfl: 3    fenofibrate (TRICOR) 54 MG tablet, Take 1 tablet (54 mg total) by mouth once daily., Disp: 90 tablet, Rfl: 3    lisinopriL (PRINIVIL,ZESTRIL) 2.5 MG tablet, TAKE 1 TABLET BY MOUTH EVERY DAY, Disp: 90 tablet, Rfl: 3    metoprolol succinate (TOPROL-XL) 100 MG 24 hr tablet, Take 1 tablet (100 mg total) by mouth once daily., Disp: 90 tablet, Rfl: 3    traZODone (DESYREL) 100 MG tablet, TAKE ONE TABLET BY MOUTH AT BEDTIME, Disp: 90 tablet, Rfl: 2    aspirin (ECOTRIN) 81 MG EC tablet, Take 1 tablet (81 mg total) by mouth once daily., Disp: 90 tablet, Rfl: 3    triamcinolone acetonide 0.025% (KENALOG) 0.025 % cream, APPLY TO AFFECTED AREA TWO TIMES A DAY, Disp: 15 g, Rfl: 1    Review of Systems   Constitutional:  Negative for appetite change, chills, fatigue, fever and malaise/fatigue.   Eyes:  Positive for blurred vision (chronic) and visual disturbance (legally blind).   Respiratory:  Negative for cough, shortness of breath and wheezing.    Cardiovascular:  Negative for chest pain and palpitations.  "  Gastrointestinal:  Negative for abdominal pain, blood in stool, constipation, diarrhea, nausea and vomiting.   Genitourinary:  Negative for dysuria and frequency.   Neurological:  Negative for dizziness, light-headedness and headaches.   Psychiatric/Behavioral:  Negative for dysphoric mood. The patient is not nervous/anxious.        Objective:   /80 (BP Location: Left arm, Patient Position: Sitting, BP Method: Large (Manual))   Pulse 94   Temp 98.1 °F (36.7 °C) (Temporal)   Resp 18   Ht 5' 7" (1.702 m)   Wt 86 kg (189 lb 9.5 oz)   SpO2 95%   BMI 29.69 kg/m²      Physical Exam  Constitutional:       General: He is not in acute distress.     Appearance: Normal appearance. He is not ill-appearing.   Cardiovascular:      Rate and Rhythm: Normal rate.   Pulmonary:      Effort: Pulmonary effort is normal. No respiratory distress.   Skin:     General: Skin is warm and dry.      Coloration: Skin is not pale.      Findings: No erythema.   Neurological:      Mental Status: He is alert and oriented to person, place, and time.   Psychiatric:         Mood and Affect: Mood normal.         Behavior: Behavior normal.         Assessment & Plan     Problem List Items Addressed This Visit          Psychiatric    JERMAINE (generalized anxiety disorder)    Current Assessment & Plan     Stable on lexapro.             Cardiac/Vascular    Hyperlipidemia (Chronic)    Current Assessment & Plan     Lipid panel reviewed. On fenofibrate and statin.          Primary hypertension - Primary (Chronic)    Current Assessment & Plan     Blood pressure stable. Continue current medications.          History of non-ST elevation myocardial infarction (NSTEMI)    Current Assessment & Plan     Followed by cardiology. Asa refilled. On statin, plavix and tricor.         Relevant Medications    aspirin (ECOTRIN) 81 MG EC tablet       Endocrine    Overweight with body mass index (BMI) of 29 to 29.9 in adult    Current Assessment & Plan     Counseled " "on importance of diet and exercise in order to improve overall quality of life, and reduce risk of future comorbidities.            Other    Tobacco use (Chronic)    Current Assessment & Plan     Decreased to 0.5 PPD   Counseled on the importance of smoke cessation in order to improve overall quality of life and reduce risk of future comorbidities. Declined smoke cessation program due to transportation            Other Visit Diagnoses       Dermatitis        Relevant Medications    triamcinolone acetonide 0.025% (KENALOG) 0.025 % cream    Colon cancer screening        Relevant Orders    Ambulatory referral/consult to Endo Procedure     Need for pneumococcal vaccination        Relevant Orders    (In Office Administered) Pneumococcal Conjugate Vaccine (20 Valent) (IM) (Preferred)            Follow up in about 6 months (around 6/8/2024) for hypertension.          Portions of this note may have been created with voice recognition software. Occasional "wrong-word" or "sound-a-like" substitutions may have occurred due to the inherent limitations of voice recognition software. Please, read the note carefully and recognize, using context, where substitutions have occurred.       "

## 2023-12-08 NOTE — ASSESSMENT & PLAN NOTE
Decreased to 0.5 PPD   Counseled on the importance of smoke cessation in order to improve overall quality of life and reduce risk of future comorbidities. Declined smoke cessation program due to transportation

## 2023-12-12 ENCOUNTER — HOSPITAL ENCOUNTER (OUTPATIENT)
Dept: PREADMISSION TESTING | Facility: HOSPITAL | Age: 43
Discharge: HOME | End: 2023-12-12
Attending: COLON & RECTAL SURGERY
Payer: MEDICARE

## 2023-12-12 DIAGNOSIS — Z12.11 COLON CANCER SCREENING: ICD-10-CM

## 2024-03-06 ENCOUNTER — TELEPHONE (OUTPATIENT)
Dept: ADMINISTRATIVE | Facility: CLINIC | Age: 44
End: 2024-03-06
Payer: MEDICARE

## 2024-03-06 NOTE — TELEPHONE ENCOUNTER
Called pt, no answer; left message informing pt I was calling to remind pt of his in office EAWV on 3/8/24 and to return my call if he had any questions; left my name and number.

## 2024-04-24 ENCOUNTER — NURSE TRIAGE (OUTPATIENT)
Dept: ADMINISTRATIVE | Facility: CLINIC | Age: 44
End: 2024-04-24
Payer: MEDICARE

## 2024-04-24 ENCOUNTER — OFFICE VISIT (OUTPATIENT)
Dept: INTERNAL MEDICINE | Facility: CLINIC | Age: 44
End: 2024-04-24
Payer: MEDICARE

## 2024-04-24 VITALS
WEIGHT: 180.31 LBS | RESPIRATION RATE: 14 BRPM | OXYGEN SATURATION: 98 % | SYSTOLIC BLOOD PRESSURE: 120 MMHG | DIASTOLIC BLOOD PRESSURE: 72 MMHG | HEIGHT: 67 IN | BODY MASS INDEX: 28.3 KG/M2 | HEART RATE: 77 BPM | TEMPERATURE: 98 F

## 2024-04-24 DIAGNOSIS — L02.91 ABSCESS: Primary | ICD-10-CM

## 2024-04-24 DIAGNOSIS — L30.9 DERMATITIS: ICD-10-CM

## 2024-04-24 DIAGNOSIS — I10 PRIMARY HYPERTENSION: Chronic | ICD-10-CM

## 2024-04-24 DIAGNOSIS — I25.2 HISTORY OF NON-ST ELEVATION MYOCARDIAL INFARCTION (NSTEMI): ICD-10-CM

## 2024-04-24 PROCEDURE — 4010F ACE/ARB THERAPY RXD/TAKEN: CPT | Mod: CPTII,S$GLB,, | Performed by: NURSE PRACTITIONER

## 2024-04-24 PROCEDURE — 99214 OFFICE O/P EST MOD 30 MIN: CPT | Mod: S$GLB,,, | Performed by: NURSE PRACTITIONER

## 2024-04-24 PROCEDURE — 99999 PR PBB SHADOW E&M-EST. PATIENT-LVL V: CPT | Mod: PBBFAC,,, | Performed by: NURSE PRACTITIONER

## 2024-04-24 PROCEDURE — 1160F RVW MEDS BY RX/DR IN RCRD: CPT | Mod: CPTII,S$GLB,, | Performed by: NURSE PRACTITIONER

## 2024-04-24 PROCEDURE — 1159F MED LIST DOCD IN RCRD: CPT | Mod: CPTII,S$GLB,, | Performed by: NURSE PRACTITIONER

## 2024-04-24 PROCEDURE — 3078F DIAST BP <80 MM HG: CPT | Mod: CPTII,S$GLB,, | Performed by: NURSE PRACTITIONER

## 2024-04-24 PROCEDURE — 3008F BODY MASS INDEX DOCD: CPT | Mod: CPTII,S$GLB,, | Performed by: NURSE PRACTITIONER

## 2024-04-24 PROCEDURE — 3074F SYST BP LT 130 MM HG: CPT | Mod: CPTII,S$GLB,, | Performed by: NURSE PRACTITIONER

## 2024-04-24 RX ORDER — TRIAMCINOLONE ACETONIDE 1 MG/G
CREAM TOPICAL 2 TIMES DAILY
Qty: 15 G | Refills: 0 | Status: SHIPPED | OUTPATIENT
Start: 2024-04-24 | End: 2024-05-08

## 2024-04-24 RX ORDER — MUPIROCIN 20 MG/G
OINTMENT TOPICAL 3 TIMES DAILY
Qty: 22 G | Refills: 0 | Status: SHIPPED | OUTPATIENT
Start: 2024-04-24 | End: 2024-05-01

## 2024-04-24 RX ORDER — SULFAMETHOXAZOLE AND TRIMETHOPRIM 800; 160 MG/1; MG/1
1 TABLET ORAL 2 TIMES DAILY
Qty: 14 TABLET | Refills: 0 | Status: SHIPPED | OUTPATIENT
Start: 2024-04-24 | End: 2024-05-01

## 2024-04-24 NOTE — TELEPHONE ENCOUNTER
Pt has an lump to left knee and a rash on right wrist. The lump on knee had drainage after he attempted to squeeze it but then inserted a needle into it. Pt has been continuing to squeeze it and does get some drainage. Care advice is to be seen today, appointment scheduled. Pt instructed to call back with additional questions or worsening of symptoms. Pt verbalized understanding.   Reason for Disposition   Boil > 1/2 inch across (> 12 mm; larger than a marble) and center is soft or pus colored    Additional Information   Negative: Widespread rash and bright red, sunburn-like and too weak to stand   Negative: Sounds like a life-threatening emergency to the triager   Negative: Widespread red rash   Negative: Black (necrotic), dark purple, or blisters develop in area of wound   Negative: Patient sounds very sick or weak to the triager   Negative: SEVERE pain (e.g., excruciating)   Negative: Red streak from area of infection   Negative: Fever > 100.4 F (38.0 C)   Negative: Boil > 2 inches across (> 5 cm; larger than a golf ball or ping pong ball)    Protocols used: Boil (Skin Abscess)-A-OH

## 2024-04-24 NOTE — PROGRESS NOTES
Subjective:       Patient ID: Philipp Gonzales is a 43 y.o. male.    Chief Complaint: Rash (Rash to right wrist and abscess to left knee)    Mr. Gonzales presents to clinic for itchy rash to right wrist x 2 weeks. Denies pain or drainage. Denies known irritant. Tried OTC itch cream and topical antibiotic cream without relief.    Noticed abscess to left knee 2-3 weeks prior with occasional drainage. He initially popped the abscess with a needle which resulted in drainage and symptoms improved. Recently started drainage again and tender to touch. Denies fever, chills, or body aches.    Rash  This is a new problem. The current episode started 1 to 4 weeks ago. The affected locations include the right wrist. The rash is characterized by itchiness and redness. He was exposed to nothing. Pertinent negatives include no congestion, diarrhea, eye pain, facial edema, fever, rhinorrhea or sore throat. Past treatments include anti-itch cream and antibiotic cream. The treatment provided no relief.   Abscess  Chronicity:  RecurrentProgression Since Onset: waxing and waning  Location:  Leg  Associated Symptoms: no fever, no chills, no sweats  Characteristics: draining, painful, redness and swelling    Treatments Tried:  Draining/squeezing and topical antibiotics  Relieved by:  Draining/squeezing  Worsened by:  Nothing    Patient Active Problem List   Diagnosis    Dental caries    JERMAINE (generalized anxiety disorder)    Attention deficit hyperactivity disorder    Gastroesophageal reflux disease without esophagitis    Hyperlipidemia    Overweight with body mass index (BMI) of 29 to 29.9 in adult    Acute right-sided low back pain with right-sided sciatica    Pain, dental    Tobacco use    Chronic pain of right knee    ACS (acute coronary syndrome)    Primary hypertension    History of non-ST elevation myocardial infarction (NSTEMI)       Family History   Problem Relation Name Age of Onset    Macular degeneration Mother      Macular  degeneration Maternal Grandfather      Hyperlipidemia Father      Diabetes Father      Heart attacks under age 50 Paternal Grandmother       Past Surgical History:   Procedure Laterality Date    CORONARY STENT PLACEMENT N/A 4/3/2022    Procedure: INSERTION, STENT, CORONARY ARTERY;  Surgeon: Eliud Cristina MD;  Location: [facility] CATH LAB;  Service: Cardiology;  Laterality: N/A;    HIP SURGERY      as child left    LEFT HEART CATHETERIZATION Left 4/3/2022    Procedure: CATHETERIZATION, HEART, LEFT;  Surgeon: Eliud Cristina MD;  Location: [facility] CATH LAB;  Service: Cardiology;  Laterality: Left;         Current Outpatient Medications:     aspirin (ECOTRIN) 81 MG EC tablet, Take 1 tablet (81 mg total) by mouth once daily., Disp: 90 tablet, Rfl: 3    atorvastatin (LIPITOR) 80 MG tablet, TAKE 1 TABLET BY MOUTH EVERY DAY, Disp: 90 tablet, Rfl: 3    clopidogreL (PLAVIX) 75 mg tablet, TAKE 1 TABLET BY MOUTH EVERY DAY, Disp: 90 tablet, Rfl: 3    EScitalopram oxalate (LEXAPRO) 20 MG tablet, Take 1 tablet (20 mg total) by mouth once daily., Disp: 90 tablet, Rfl: 3    fenofibrate (TRICOR) 54 MG tablet, Take 1 tablet (54 mg total) by mouth once daily., Disp: 90 tablet, Rfl: 3    lisinopriL (PRINIVIL,ZESTRIL) 2.5 MG tablet, TAKE 1 TABLET BY MOUTH EVERY DAY, Disp: 90 tablet, Rfl: 3    metoprolol succinate (TOPROL-XL) 100 MG 24 hr tablet, Take 1 tablet (100 mg total) by mouth once daily., Disp: 90 tablet, Rfl: 3    traZODone (DESYREL) 100 MG tablet, TAKE ONE TABLET BY MOUTH AT BEDTIME, Disp: 90 tablet, Rfl: 2    mupirocin (BACTROBAN) 2 % ointment, Apply topically 3 (three) times daily. for 7 days, Disp: 22 g, Rfl: 0    sulfamethoxazole-trimethoprim 800-160mg (BACTRIM DS) 800-160 mg Tab, Take 1 tablet by mouth 2 (two) times daily. for 7 days, Disp: 14 tablet, Rfl: 0    triamcinolone acetonide 0.1% (KENALOG) 0.1 % cream, Apply topically 2 (two) times daily. for 14 days, Disp: 15 g, Rfl: 0    Review of Systems   Constitutional:   "Negative for chills and fever.   HENT:  Negative for congestion, rhinorrhea and sore throat.    Eyes:  Negative for pain.   Gastrointestinal:  Negative for diarrhea.   Musculoskeletal:  Negative for myalgias.   Skin:  Positive for color change, rash and wound.       Objective:   /72 (BP Location: Left arm, Patient Position: Sitting, BP Method: X-Large (Manual))   Pulse 77   Temp 97.6 °F (36.4 °C) (Oral)   Resp 14   Ht 5' 7" (1.702 m)   Wt 81.8 kg (180 lb 5.4 oz)   SpO2 98%   BMI 28.24 kg/m²      Physical Exam  Constitutional:       General: He is not in acute distress.     Appearance: Normal appearance. He is not ill-appearing.   Cardiovascular:      Rate and Rhythm: Normal rate.   Pulmonary:      Effort: Pulmonary effort is normal. No respiratory distress.   Skin:            Comments:  Papular, erythematous area right wrist    Neurological:      Mental Status: He is alert and oriented to person, place, and time.   Psychiatric:         Behavior: Behavior normal.               Assessment & Plan     1. Abscess  Comments:  Oral and topical abx. Warm compresses/soaks. RTC precautions reviewed.  Orders:  -     sulfamethoxazole-trimethoprim 800-160mg (BACTRIM DS) 800-160 mg Tab; Take 1 tablet by mouth 2 (two) times daily. for 7 days  Dispense: 14 tablet; Refill: 0  -     mupirocin (BACTROBAN) 2 % ointment; Apply topically 3 (three) times daily. for 7 days  Dispense: 22 g; Refill: 0    2. Dermatitis  -     triamcinolone acetonide 0.1% (KENALOG) 0.1 % cream; Apply topically 2 (two) times daily. for 14 days  Dispense: 15 g; Refill: 0    3. Primary hypertension  Assessment & Plan:  Stable. Continue current medications as prescribed      4. History of non-ST elevation myocardial infarction (NSTEMI)  Assessment & Plan:  Continue asa, plavix, statin, and BB.         I advised patient to: keep area clean and dry; apply antibiotic ointment as prescribed; refrain from squeezing or irritating site; apply moist heat to " "help with pain and abscess drainage; and to take antibiotics as prescribed.     Instructed to follow up with primary care provider, urgent care facility, or the emergency room if symptoms worsen and they develop a fever greater than 102.5 °F, have pain unrelieved by OTC or prescription medications, and excessive swelling.         BRETT Pacheco-NEHEMIAH      Portions of this note may have been created with voice recognition software. Occasional "wrong-word" or "sound-a-like" substitutions may have occurred due to the inherent limitations of voice recognition software. Please, read the note carefully and recognize, using context, where substitutions have occurred.     "

## 2024-05-06 DIAGNOSIS — I21.4 NSTEMI (NON-ST ELEVATED MYOCARDIAL INFARCTION): ICD-10-CM

## 2024-05-06 RX ORDER — LISINOPRIL 2.5 MG/1
2.5 TABLET ORAL
Qty: 90 TABLET | Refills: 3 | Status: SHIPPED | OUTPATIENT
Start: 2024-05-06

## 2024-05-06 RX ORDER — ATORVASTATIN CALCIUM 80 MG/1
TABLET, FILM COATED ORAL
Qty: 90 TABLET | Refills: 3 | Status: SHIPPED | OUTPATIENT
Start: 2024-05-06

## 2024-05-06 RX ORDER — CLOPIDOGREL BISULFATE 75 MG/1
TABLET ORAL
Qty: 90 TABLET | Refills: 3 | Status: SHIPPED | OUTPATIENT
Start: 2024-05-06

## 2024-07-05 ENCOUNTER — OFFICE VISIT (OUTPATIENT)
Dept: INTERNAL MEDICINE | Facility: CLINIC | Age: 44
End: 2024-07-05
Payer: MEDICARE

## 2024-07-05 VITALS
BODY MASS INDEX: 29.07 KG/M2 | OXYGEN SATURATION: 98 % | RESPIRATION RATE: 16 BRPM | SYSTOLIC BLOOD PRESSURE: 120 MMHG | TEMPERATURE: 98 F | DIASTOLIC BLOOD PRESSURE: 60 MMHG | WEIGHT: 185.19 LBS | HEART RATE: 80 BPM | HEIGHT: 67 IN

## 2024-07-05 DIAGNOSIS — I25.2 HISTORY OF NON-ST ELEVATION MYOCARDIAL INFARCTION (NSTEMI): ICD-10-CM

## 2024-07-05 DIAGNOSIS — F41.1 GAD (GENERALIZED ANXIETY DISORDER): ICD-10-CM

## 2024-07-05 DIAGNOSIS — R53.83 FATIGUE, UNSPECIFIED TYPE: ICD-10-CM

## 2024-07-05 DIAGNOSIS — I10 PRIMARY HYPERTENSION: Primary | Chronic | ICD-10-CM

## 2024-07-05 DIAGNOSIS — L30.9 DERMATITIS: ICD-10-CM

## 2024-07-05 DIAGNOSIS — E78.5 HYPERLIPIDEMIA, UNSPECIFIED HYPERLIPIDEMIA TYPE: Chronic | ICD-10-CM

## 2024-07-05 DIAGNOSIS — Z12.11 COLON CANCER SCREENING: ICD-10-CM

## 2024-07-05 DIAGNOSIS — E66.3 OVERWEIGHT WITH BODY MASS INDEX (BMI) OF 29 TO 29.9 IN ADULT: ICD-10-CM

## 2024-07-05 DIAGNOSIS — Z72.0 TOBACCO USE: Chronic | ICD-10-CM

## 2024-07-05 PROCEDURE — 99999 PR PBB SHADOW E&M-EST. PATIENT-LVL III: CPT | Mod: PBBFAC,,, | Performed by: NURSE PRACTITIONER

## 2024-07-05 RX ORDER — ESCITALOPRAM OXALATE 20 MG/1
20 TABLET ORAL DAILY
Qty: 90 TABLET | Refills: 3 | Status: SHIPPED | OUTPATIENT
Start: 2024-07-05

## 2024-07-05 RX ORDER — TRIAMCINOLONE ACETONIDE 0.25 MG/G
CREAM TOPICAL 2 TIMES DAILY
COMMUNITY
Start: 2024-05-14 | End: 2024-07-05

## 2024-07-05 RX ORDER — METOPROLOL SUCCINATE 100 MG/1
100 TABLET, EXTENDED RELEASE ORAL DAILY
Qty: 90 TABLET | Refills: 3 | Status: SHIPPED | OUTPATIENT
Start: 2024-07-05 | End: 2025-07-05

## 2024-07-05 RX ORDER — FENOFIBRATE 54 MG/1
54 TABLET ORAL DAILY
Qty: 90 TABLET | Refills: 3 | Status: SHIPPED | OUTPATIENT
Start: 2024-07-05

## 2024-07-05 RX ORDER — BETAMETHASONE VALERATE 1 MG/G
CREAM TOPICAL 2 TIMES DAILY
Qty: 45 G | Refills: 0 | Status: SHIPPED | OUTPATIENT
Start: 2024-07-05 | End: 2024-07-19

## 2024-07-05 RX ORDER — TRAZODONE HYDROCHLORIDE 100 MG/1
100 TABLET ORAL NIGHTLY
Qty: 90 TABLET | Refills: 2 | Status: SHIPPED | OUTPATIENT
Start: 2024-07-05

## 2024-07-05 NOTE — ASSESSMENT & PLAN NOTE
Counseled on the importance of smoke cessation in order to improve overall quality of life and reduce risk of future comorbidities. Declines smoke cessation program.   Assistance with smoking cessation was offered, including:  [x]  Medications  [x]  Counseling  []  Printed Information on Smoking Cessation  []  Referral to a Smoking Cessation Program     Patient was counseled regarding smoking for 3-10 minutes.

## 2024-07-05 NOTE — ASSESSMENT & PLAN NOTE
Blood pressure stable. Continue current medications.   Patient states everything well. Detail Level: Zone

## 2024-07-05 NOTE — PROGRESS NOTES
Subjective:       Patient ID: Philipp Gonzales is a 44 y.o. male.    Chief Complaint: Hypertension    Mr. Gonzales presents to visit for routine 6 month HTN follow up visit.     Expresses concerns of low testosterone due to low motivation, drive to do anything, and fatigue. Denies ED, but does not really have sex drive. Denies depression, SI, or thoughts of self harm.     Hypertension  This is a chronic problem. The current episode started more than 1 year ago. The problem is controlled. Associated symptoms include malaise/fatigue. Pertinent negatives include no anxiety, blurred vision, chest pain, headaches, neck pain, palpitations, peripheral edema or shortness of breath. There are no associated agents to hypertension. Risk factors for coronary artery disease include dyslipidemia and male gender. Past treatments include ACE inhibitors and beta blockers. Hypertensive end-organ damage includes CAD/MI. There is no history of angina, kidney disease, CVA, heart failure or PVD.       Patient Active Problem List   Diagnosis    Dental caries    JERMAINE (generalized anxiety disorder)    Attention deficit hyperactivity disorder    Gastroesophageal reflux disease without esophagitis    Hyperlipidemia    Overweight with body mass index (BMI) of 29 to 29.9 in adult    Acute right-sided low back pain with right-sided sciatica    Pain, dental    Tobacco use    Chronic pain of right knee    ACS (acute coronary syndrome)    Primary hypertension    History of non-ST elevation myocardial infarction (NSTEMI)       Family History   Problem Relation Name Age of Onset    Macular degeneration Mother      Macular degeneration Maternal Grandfather      Hyperlipidemia Father      Diabetes Father      Heart attacks under age 50 Paternal Grandmother       Past Surgical History:   Procedure Laterality Date    CORONARY STENT PLACEMENT N/A 4/3/2022    Procedure: INSERTION, STENT, CORONARY ARTERY;  Surgeon: Eliud Cristina MD;  Location: [facility] CATH  LAB;  Service: Cardiology;  Laterality: N/A;    HIP SURGERY      as child left    LEFT HEART CATHETERIZATION Left 4/3/2022    Procedure: CATHETERIZATION, HEART, LEFT;  Surgeon: Eliud Cristina MD;  Location: [facility] CATH LAB;  Service: Cardiology;  Laterality: Left;         Current Outpatient Medications:     aspirin (ECOTRIN) 81 MG EC tablet, Take 1 tablet (81 mg total) by mouth once daily., Disp: 90 tablet, Rfl: 3    atorvastatin (LIPITOR) 80 MG tablet, TAKE 1 TABLET BY MOUTH EVERY DAY, Disp: 90 tablet, Rfl: 3    clopidogreL (PLAVIX) 75 mg tablet, TAKE 1 TABLET BY MOUTH EVERY DAY, Disp: 90 tablet, Rfl: 3    lisinopriL (PRINIVIL,ZESTRIL) 2.5 MG tablet, TAKE ONE TABLET BY MOUTH EVERY DAY, Disp: 90 tablet, Rfl: 3    betamethasone valerate 0.1% (VALISONE) 0.1 % Crea, Apply topically 2 (two) times daily. for 14 days, Disp: 45 g, Rfl: 0    EScitalopram oxalate (LEXAPRO) 20 MG tablet, Take 1 tablet (20 mg total) by mouth once daily., Disp: 90 tablet, Rfl: 3    fenofibrate (TRICOR) 54 MG tablet, Take 1 tablet (54 mg total) by mouth once daily., Disp: 90 tablet, Rfl: 3    metoprolol succinate (TOPROL-XL) 100 MG 24 hr tablet, Take 1 tablet (100 mg total) by mouth once daily., Disp: 90 tablet, Rfl: 3    traZODone (DESYREL) 100 MG tablet, Take 1 tablet (100 mg total) by mouth every evening., Disp: 90 tablet, Rfl: 2    Review of Systems   Constitutional:  Positive for fatigue and malaise/fatigue. Negative for appetite change, chills, diaphoresis and fever.   Eyes:  Negative for blurred vision.   Respiratory:  Negative for cough and shortness of breath.    Cardiovascular:  Negative for chest pain and palpitations.   Gastrointestinal:  Negative for abdominal pain, nausea and vomiting.   Genitourinary:  Negative for dysuria and frequency.   Musculoskeletal:  Negative for neck pain.   Neurological:  Negative for dizziness, light-headedness and headaches.   Psychiatric/Behavioral:  Negative for dysphoric mood, self-injury and  "suicidal ideas. The patient is not nervous/anxious.        Objective:   /60 (BP Location: Left arm, Patient Position: Sitting, BP Method: Large (Manual))   Pulse 80   Temp 98.2 °F (36.8 °C) (Oral)   Resp 16   Ht 5' 7" (1.702 m)   Wt 84 kg (185 lb 3 oz)   SpO2 98%   BMI 29.00 kg/m²      Physical Exam  Vitals reviewed.   Constitutional:       General: He is not in acute distress.     Appearance: Normal appearance. He is well-developed. He is not ill-appearing or diaphoretic.   HENT:      Head: Normocephalic.   Cardiovascular:      Rate and Rhythm: Normal rate and regular rhythm.      Pulses: Normal pulses.      Heart sounds: Normal heart sounds. No murmur heard.     No friction rub. No gallop.   Pulmonary:      Effort: Pulmonary effort is normal. No respiratory distress.      Breath sounds: Normal breath sounds. No rales.   Musculoskeletal:         General: Normal range of motion.      Cervical back: Normal range of motion and neck supple.   Skin:     General: Skin is warm and dry.      Coloration: Skin is not pale.      Findings: No erythema.      Comments: Pink dry, scabbed rash to R wrist.   Neurological:      Mental Status: He is alert and oriented to person, place, and time.   Psychiatric:         Mood and Affect: Mood normal.         Behavior: Behavior normal.         Assessment & Plan     Problem List Items Addressed This Visit          Psychiatric    JERMAINE (generalized anxiety disorder)    Current Assessment & Plan     Stable on lexapro.          Relevant Medications    EScitalopram oxalate (LEXAPRO) 20 MG tablet    traZODone (DESYREL) 100 MG tablet       Cardiac/Vascular    Hyperlipidemia (Chronic)    Current Assessment & Plan     Lipid panel ordered. On statin and fenofibrate.          Relevant Medications    fenofibrate (TRICOR) 54 MG tablet    Other Relevant Orders    Lipid Panel    Primary hypertension - Primary (Chronic)    Current Assessment & Plan     Blood pressure stable. Continue current " "medications.         Relevant Orders    CBC Auto Differential    COMPREHENSIVE METABOLIC PANEL    Lipid Panel    History of non-ST elevation myocardial infarction (NSTEMI)    Relevant Medications    metoprolol succinate (TOPROL-XL) 100 MG 24 hr tablet       Endocrine    Overweight with body mass index (BMI) of 29 to 29.9 in adult    Current Assessment & Plan     Counseled on importance of diet and exercise in order to improve overall quality of life, and reduce risk of future comorbidities.              Other    Tobacco use (Chronic)    Current Assessment & Plan     Counseled on the importance of smoke cessation in order to improve overall quality of life and reduce risk of future comorbidities. Declines smoke cessation program.   Assistance with smoking cessation was offered, including:  [x]  Medications  [x]  Counseling  []  Printed Information on Smoking Cessation  []  Referral to a Smoking Cessation Program     Patient was counseled regarding smoking for 3-10 minutes.            Other Visit Diagnoses       Colon cancer screening        Relevant Orders    Ambulatory referral/consult to Endo Procedure     Fatigue, unspecified type        Relevant Orders    Testosterone    Dermatitis        Relevant Medications    betamethasone valerate 0.1% (VALISONE) 0.1 % Crea          Follow up in about 6 months (around 1/5/2025), or if symptoms worsen or fail to improve, for hypertension.            Portions of this note may have been created with voice recognition software. Occasional "wrong-word" or "sound-a-like" substitutions may have occurred due to the inherent limitations of voice recognition software. Please, read the note carefully and recognize, using context, where substitutions have occurred.       "

## 2024-07-17 ENCOUNTER — LAB VISIT (OUTPATIENT)
Dept: LAB | Facility: HOSPITAL | Age: 44
End: 2024-07-17
Attending: NURSE PRACTITIONER
Payer: MEDICARE

## 2024-07-17 ENCOUNTER — OFFICE VISIT (OUTPATIENT)
Dept: INTERNAL MEDICINE | Facility: CLINIC | Age: 44
End: 2024-07-17
Payer: MEDICARE

## 2024-07-17 VITALS
DIASTOLIC BLOOD PRESSURE: 58 MMHG | OXYGEN SATURATION: 97 % | TEMPERATURE: 98 F | WEIGHT: 184.06 LBS | RESPIRATION RATE: 18 BRPM | HEART RATE: 80 BPM | BODY MASS INDEX: 28.89 KG/M2 | SYSTOLIC BLOOD PRESSURE: 110 MMHG | HEIGHT: 67 IN

## 2024-07-17 DIAGNOSIS — K02.9 DENTAL CARIES: Primary | ICD-10-CM

## 2024-07-17 DIAGNOSIS — E78.5 HYPERLIPIDEMIA, UNSPECIFIED HYPERLIPIDEMIA TYPE: Chronic | ICD-10-CM

## 2024-07-17 DIAGNOSIS — I10 PRIMARY HYPERTENSION: Chronic | ICD-10-CM

## 2024-07-17 DIAGNOSIS — I25.2 HISTORY OF NON-ST ELEVATION MYOCARDIAL INFARCTION (NSTEMI): ICD-10-CM

## 2024-07-17 DIAGNOSIS — R53.83 FATIGUE, UNSPECIFIED TYPE: ICD-10-CM

## 2024-07-17 LAB
ALBUMIN SERPL BCP-MCNC: 3.6 G/DL (ref 3.5–5.2)
ALP SERPL-CCNC: 78 U/L (ref 55–135)
ALT SERPL W/O P-5'-P-CCNC: 12 U/L (ref 10–44)
ANION GAP SERPL CALC-SCNC: 9 MMOL/L (ref 8–16)
AST SERPL-CCNC: 16 U/L (ref 10–40)
BASOPHILS # BLD AUTO: 0.07 K/UL (ref 0–0.2)
BASOPHILS NFR BLD: 0.9 % (ref 0–1.9)
BILIRUB SERPL-MCNC: 0.2 MG/DL (ref 0.1–1)
BUN SERPL-MCNC: 11 MG/DL (ref 6–20)
CALCIUM SERPL-MCNC: 9.6 MG/DL (ref 8.7–10.5)
CHLORIDE SERPL-SCNC: 106 MMOL/L (ref 95–110)
CHOLEST SERPL-MCNC: 75 MG/DL (ref 120–199)
CHOLEST/HDLC SERPL: 2.5 {RATIO} (ref 2–5)
CO2 SERPL-SCNC: 23 MMOL/L (ref 23–29)
CREAT SERPL-MCNC: 1 MG/DL (ref 0.5–1.4)
DIFFERENTIAL METHOD BLD: ABNORMAL
EOSINOPHIL # BLD AUTO: 0.3 K/UL (ref 0–0.5)
EOSINOPHIL NFR BLD: 4.4 % (ref 0–8)
ERYTHROCYTE [DISTWIDTH] IN BLOOD BY AUTOMATED COUNT: 21 % (ref 11.5–14.5)
EST. GFR  (NO RACE VARIABLE): >60 ML/MIN/1.73 M^2
GLUCOSE SERPL-MCNC: 98 MG/DL (ref 70–110)
HCT VFR BLD AUTO: 33.4 % (ref 40–54)
HDLC SERPL-MCNC: 30 MG/DL (ref 40–75)
HDLC SERPL: 40 % (ref 20–50)
HGB BLD-MCNC: 9.6 G/DL (ref 14–18)
IMM GRANULOCYTES # BLD AUTO: 0.03 K/UL (ref 0–0.04)
IMM GRANULOCYTES NFR BLD AUTO: 0.4 % (ref 0–0.5)
LDLC SERPL CALC-MCNC: 17.6 MG/DL (ref 63–159)
LYMPHOCYTES # BLD AUTO: 2.5 K/UL (ref 1–4.8)
LYMPHOCYTES NFR BLD: 32.8 % (ref 18–48)
MCH RBC QN AUTO: 20.7 PG (ref 27–31)
MCHC RBC AUTO-ENTMCNC: 28.7 G/DL (ref 32–36)
MCV RBC AUTO: 72 FL (ref 82–98)
MONOCYTES # BLD AUTO: 0.6 K/UL (ref 0.3–1)
MONOCYTES NFR BLD: 7.1 % (ref 4–15)
NEUTROPHILS # BLD AUTO: 4.2 K/UL (ref 1.8–7.7)
NEUTROPHILS NFR BLD: 54.4 % (ref 38–73)
NONHDLC SERPL-MCNC: 45 MG/DL
NRBC BLD-RTO: 0 /100 WBC
PLATELET # BLD AUTO: 318 K/UL (ref 150–450)
PMV BLD AUTO: 8.8 FL (ref 9.2–12.9)
POTASSIUM SERPL-SCNC: 4.3 MMOL/L (ref 3.5–5.1)
PROT SERPL-MCNC: 7.2 G/DL (ref 6–8.4)
RBC # BLD AUTO: 4.63 M/UL (ref 4.6–6.2)
SODIUM SERPL-SCNC: 138 MMOL/L (ref 136–145)
TESTOST SERPL-MCNC: 409 NG/DL (ref 304–1227)
TRIGL SERPL-MCNC: 137 MG/DL (ref 30–150)
WBC # BLD AUTO: 7.71 K/UL (ref 3.9–12.7)

## 2024-07-17 PROCEDURE — 80061 LIPID PANEL: CPT | Performed by: NURSE PRACTITIONER

## 2024-07-17 PROCEDURE — 80053 COMPREHEN METABOLIC PANEL: CPT | Mod: PO | Performed by: NURSE PRACTITIONER

## 2024-07-17 PROCEDURE — 84403 ASSAY OF TOTAL TESTOSTERONE: CPT | Performed by: NURSE PRACTITIONER

## 2024-07-17 PROCEDURE — 3078F DIAST BP <80 MM HG: CPT | Mod: CPTII,S$GLB,, | Performed by: NURSE PRACTITIONER

## 2024-07-17 PROCEDURE — 4010F ACE/ARB THERAPY RXD/TAKEN: CPT | Mod: CPTII,S$GLB,, | Performed by: NURSE PRACTITIONER

## 2024-07-17 PROCEDURE — 85025 COMPLETE CBC W/AUTO DIFF WBC: CPT | Mod: PO | Performed by: NURSE PRACTITIONER

## 2024-07-17 PROCEDURE — 99999 PR PBB SHADOW E&M-EST. PATIENT-LVL III: CPT | Mod: PBBFAC,,, | Performed by: NURSE PRACTITIONER

## 2024-07-17 PROCEDURE — 1159F MED LIST DOCD IN RCRD: CPT | Mod: CPTII,S$GLB,, | Performed by: NURSE PRACTITIONER

## 2024-07-17 PROCEDURE — 36415 COLL VENOUS BLD VENIPUNCTURE: CPT | Mod: PO | Performed by: NURSE PRACTITIONER

## 2024-07-17 PROCEDURE — 3074F SYST BP LT 130 MM HG: CPT | Mod: CPTII,S$GLB,, | Performed by: NURSE PRACTITIONER

## 2024-07-17 PROCEDURE — 3008F BODY MASS INDEX DOCD: CPT | Mod: CPTII,S$GLB,, | Performed by: NURSE PRACTITIONER

## 2024-07-17 PROCEDURE — 1160F RVW MEDS BY RX/DR IN RCRD: CPT | Mod: CPTII,S$GLB,, | Performed by: NURSE PRACTITIONER

## 2024-07-17 PROCEDURE — 99214 OFFICE O/P EST MOD 30 MIN: CPT | Mod: S$GLB,,, | Performed by: NURSE PRACTITIONER

## 2024-07-17 RX ORDER — AMOXICILLIN AND CLAVULANATE POTASSIUM 875; 125 MG/1; MG/1
1 TABLET, FILM COATED ORAL EVERY 12 HOURS
Qty: 14 TABLET | Refills: 0 | Status: SHIPPED | OUTPATIENT
Start: 2024-07-17 | End: 2024-07-24

## 2024-07-17 NOTE — PROGRESS NOTES
Subjective:       Patient ID: Philipp Gonzales is a 44 y.o. male.    Chief Complaint: Dental Pain    Mr. Gonzales presents to clinic for right lower facial/gum pain radiating to right ear.  He has a chronic history of dental cavities. Pain is similar to prior episodes which resolved with antibiotics. Upper teeth have been removed.  He has an appointment scheduled with his dentist, Dr. Salazar next week for evaluation of removing lower teeth with possible implants. Denies fever or chills. No swollen glands. Denies difficulty swallowing. Takes Tylenol for pain.        Patient Active Problem List   Diagnosis    Dental caries    JERMAINE (generalized anxiety disorder)    Attention deficit hyperactivity disorder    Gastroesophageal reflux disease without esophagitis    Hyperlipidemia    Overweight with body mass index (BMI) of 29 to 29.9 in adult    Acute right-sided low back pain with right-sided sciatica    Pain, dental    Tobacco use    Chronic pain of right knee    ACS (acute coronary syndrome)    Primary hypertension    History of non-ST elevation myocardial infarction (NSTEMI)       Family History   Problem Relation Name Age of Onset    Macular degeneration Mother      Macular degeneration Maternal Grandfather      Hyperlipidemia Father      Diabetes Father      Heart attacks under age 50 Paternal Grandmother       Past Surgical History:   Procedure Laterality Date    CORONARY STENT PLACEMENT N/A 4/3/2022    Procedure: INSERTION, STENT, CORONARY ARTERY;  Surgeon: Eliud Cristina MD;  Location: [facility] CATH LAB;  Service: Cardiology;  Laterality: N/A;    HIP SURGERY      as child left    LEFT HEART CATHETERIZATION Left 4/3/2022    Procedure: CATHETERIZATION, HEART, LEFT;  Surgeon: Eliud Cristina MD;  Location: [facility] CATH LAB;  Service: Cardiology;  Laterality: Left;         Current Outpatient Medications:     aspirin (ECOTRIN) 81 MG EC tablet, Take 1 tablet (81 mg total) by mouth once daily., Disp: 90 tablet, Rfl: 3     "atorvastatin (LIPITOR) 80 MG tablet, TAKE 1 TABLET BY MOUTH EVERY DAY, Disp: 90 tablet, Rfl: 3    betamethasone valerate 0.1% (VALISONE) 0.1 % Crea, Apply topically 2 (two) times daily. for 14 days, Disp: 45 g, Rfl: 0    clopidogreL (PLAVIX) 75 mg tablet, TAKE 1 TABLET BY MOUTH EVERY DAY, Disp: 90 tablet, Rfl: 3    EScitalopram oxalate (LEXAPRO) 20 MG tablet, Take 1 tablet (20 mg total) by mouth once daily., Disp: 90 tablet, Rfl: 3    fenofibrate (TRICOR) 54 MG tablet, Take 1 tablet (54 mg total) by mouth once daily., Disp: 90 tablet, Rfl: 3    lisinopriL (PRINIVIL,ZESTRIL) 2.5 MG tablet, TAKE ONE TABLET BY MOUTH EVERY DAY, Disp: 90 tablet, Rfl: 3    metoprolol succinate (TOPROL-XL) 100 MG 24 hr tablet, Take 1 tablet (100 mg total) by mouth once daily., Disp: 90 tablet, Rfl: 3    traZODone (DESYREL) 100 MG tablet, Take 1 tablet (100 mg total) by mouth every evening., Disp: 90 tablet, Rfl: 2    amoxicillin-clavulanate 875-125mg (AUGMENTIN) 875-125 mg per tablet, Take 1 tablet by mouth every 12 (twelve) hours. for 7 days, Disp: 14 tablet, Rfl: 0    Review of Systems   Constitutional:  Negative for appetite change, chills and fever.   HENT:  Positive for dental problem and ear pain. Negative for trouble swallowing.    Musculoskeletal:  Negative for myalgias.   Hematological:  Negative for adenopathy.       Objective:   BP (!) 110/58 (BP Location: Left arm, Patient Position: Sitting, BP Method: Large (Manual))   Pulse 80   Temp 97.9 °F (36.6 °C)   Resp 18   Ht 5' 7" (1.702 m)   Wt 83.5 kg (184 lb 1.4 oz)   SpO2 97%   BMI 28.83 kg/m²      Physical Exam  Vitals reviewed.   Constitutional:       General: He is not in acute distress.     Appearance: Normal appearance. He is well-developed. He is not ill-appearing or diaphoretic.   HENT:      Head: Normocephalic and atraumatic.      Nose: Nose normal. No mucosal edema or rhinorrhea.      Right Sinus: No maxillary sinus tenderness or frontal sinus tenderness.      Left " "Sinus: No maxillary sinus tenderness or frontal sinus tenderness.      Mouth/Throat:      Mouth: No oral lesions.      Dentition: Dental tenderness and dental caries (significant to lower teeth, upper teeth pulled) present. No dental abscesses.      Pharynx: No pharyngeal swelling, oropharyngeal exudate or posterior oropharyngeal erythema.      Comments: Erythema and tenderness his lower gums. No drainage or facial swelling. No enlarged or tender lymph nodes   Eyes:      Conjunctiva/sclera: Conjunctivae normal.   Cardiovascular:      Rate and Rhythm: Normal rate.   Pulmonary:      Effort: Pulmonary effort is normal. No respiratory distress.   Musculoskeletal:         General: No tenderness.      Cervical back: Normal range of motion and neck supple. No rigidity.   Lymphadenopathy:      Cervical: No cervical adenopathy.   Skin:     General: Skin is warm and dry.      Coloration: Skin is not pale.      Findings: No erythema or rash.   Neurological:      Mental Status: He is alert and oriented to person, place, and time.      Cranial Nerves: No cranial nerve deficit.   Psychiatric:         Behavior: Behavior normal. Behavior is cooperative.         Assessment & Plan     1. Dental caries  Assessment & Plan:  Keep scheduled appointment with dentist next week. RTC and red flag symptoms reviewed    Orders:  -     amoxicillin-clavulanate 875-125mg (AUGMENTIN) 875-125 mg per tablet; Take 1 tablet by mouth every 12 (twelve) hours. for 7 days  Dispense: 14 tablet; Refill: 0    2. Primary hypertension  Assessment & Plan:  Stable. Continue current medications as prescribed      3. History of non-ST elevation myocardial infarction (NSTEMI)  Assessment & Plan:  Continue asa, plavix, statin, and BB. Avoid NSAIDs with asa and plavix.            HORACIO Pacheco      Portions of this note may have been created with voice recognition software. Occasional "wrong-word" or "sound-a-like" substitutions may have occurred due to the " inherent limitations of voice recognition software. Please, read the note carefully and recognize, using context, where substitutions have occurred.

## 2024-07-18 DIAGNOSIS — D50.9 MICROCYTIC ANEMIA: Primary | ICD-10-CM

## 2024-07-19 ENCOUNTER — LAB VISIT (OUTPATIENT)
Dept: LAB | Facility: HOSPITAL | Age: 44
End: 2024-07-19
Attending: NURSE PRACTITIONER
Payer: MEDICARE

## 2024-07-19 DIAGNOSIS — D50.9 MICROCYTIC ANEMIA: ICD-10-CM

## 2024-07-19 LAB
BASOPHILS # BLD AUTO: 0.08 K/UL (ref 0–0.2)
BASOPHILS NFR BLD: 1 % (ref 0–1.9)
DIFFERENTIAL METHOD BLD: ABNORMAL
EOSINOPHIL # BLD AUTO: 0.4 K/UL (ref 0–0.5)
EOSINOPHIL NFR BLD: 4.6 % (ref 0–8)
ERYTHROCYTE [DISTWIDTH] IN BLOOD BY AUTOMATED COUNT: 21.2 % (ref 11.5–14.5)
HCT VFR BLD AUTO: 35.9 % (ref 40–54)
HGB BLD-MCNC: 10.4 G/DL (ref 14–18)
IMM GRANULOCYTES # BLD AUTO: 0.01 K/UL (ref 0–0.04)
IMM GRANULOCYTES NFR BLD AUTO: 0.1 % (ref 0–0.5)
IRON SERPL-MCNC: 22 UG/DL (ref 45–160)
LYMPHOCYTES # BLD AUTO: 1.8 K/UL (ref 1–4.8)
LYMPHOCYTES NFR BLD: 22.1 % (ref 18–48)
MCH RBC QN AUTO: 20.8 PG (ref 27–31)
MCHC RBC AUTO-ENTMCNC: 29 G/DL (ref 32–36)
MCV RBC AUTO: 72 FL (ref 82–98)
MONOCYTES # BLD AUTO: 0.3 K/UL (ref 0.3–1)
MONOCYTES NFR BLD: 3.5 % (ref 4–15)
NEUTROPHILS # BLD AUTO: 5.6 K/UL (ref 1.8–7.7)
NEUTROPHILS NFR BLD: 68.7 % (ref 38–73)
NRBC BLD-RTO: 0 /100 WBC
PLATELET # BLD AUTO: 330 K/UL (ref 150–450)
PMV BLD AUTO: 9.2 FL (ref 9.2–12.9)
RBC # BLD AUTO: 4.99 M/UL (ref 4.6–6.2)
SATURATED IRON: 4 % (ref 20–50)
TOTAL IRON BINDING CAPACITY: 568 UG/DL (ref 250–450)
TRANSFERRIN SERPL-MCNC: 384 MG/DL (ref 200–375)
WBC # BLD AUTO: 8.11 K/UL (ref 3.9–12.7)

## 2024-07-19 PROCEDURE — 85025 COMPLETE CBC W/AUTO DIFF WBC: CPT | Mod: PO | Performed by: NURSE PRACTITIONER

## 2024-07-19 PROCEDURE — 82728 ASSAY OF FERRITIN: CPT | Performed by: NURSE PRACTITIONER

## 2024-07-19 PROCEDURE — 83540 ASSAY OF IRON: CPT | Performed by: NURSE PRACTITIONER

## 2024-07-19 PROCEDURE — 36415 COLL VENOUS BLD VENIPUNCTURE: CPT | Mod: PO | Performed by: NURSE PRACTITIONER

## 2024-07-20 LAB — FERRITIN SERPL-MCNC: 6 NG/ML (ref 20–300)

## 2024-07-22 ENCOUNTER — PATIENT MESSAGE (OUTPATIENT)
Dept: INTERNAL MEDICINE | Facility: CLINIC | Age: 44
End: 2024-07-22
Payer: MEDICARE

## 2024-07-22 DIAGNOSIS — D50.9 IRON DEFICIENCY ANEMIA, UNSPECIFIED IRON DEFICIENCY ANEMIA TYPE: Primary | ICD-10-CM

## 2024-07-22 RX ORDER — FERROUS SULFATE 325(65) MG
325 TABLET, DELAYED RELEASE (ENTERIC COATED) ORAL 2 TIMES DAILY
Qty: 180 TABLET | Refills: 0 | Status: SHIPPED | OUTPATIENT
Start: 2024-07-22

## 2024-09-01 ENCOUNTER — HOSPITAL ENCOUNTER (EMERGENCY)
Facility: HOSPITAL | Age: 44
Discharge: HOME | End: 2024-09-01
Attending: EMERGENCY MEDICINE
Payer: MEDICARE

## 2024-09-01 VITALS
HEART RATE: 65 BPM | WEIGHT: 188.81 LBS | DIASTOLIC BLOOD PRESSURE: 87 MMHG | OXYGEN SATURATION: 96 % | TEMPERATURE: 99 F | BODY MASS INDEX: 29.63 KG/M2 | HEIGHT: 67 IN | SYSTOLIC BLOOD PRESSURE: 159 MMHG | RESPIRATION RATE: 18 BRPM

## 2024-09-01 DIAGNOSIS — K08.89 PAIN, DENTAL: Primary | ICD-10-CM

## 2024-09-01 PROCEDURE — 99283 EMERGENCY DEPT VISIT LOW MDM: CPT | Mod: ER

## 2024-09-01 RX ORDER — ACETAMINOPHEN 325 MG/1
650 TABLET ORAL EVERY 6 HOURS PRN
Qty: 40 TABLET | Refills: 0 | Status: SHIPPED | OUTPATIENT
Start: 2024-09-01 | End: 2024-09-06

## 2024-09-01 RX ORDER — AMOXICILLIN AND CLAVULANATE POTASSIUM 875; 125 MG/1; MG/1
1 TABLET, FILM COATED ORAL 2 TIMES DAILY
Qty: 14 TABLET | Refills: 0 | Status: SHIPPED | OUTPATIENT
Start: 2024-09-01 | End: 2024-09-08

## 2024-09-02 NOTE — ED PROVIDER NOTES
Encounter Date: 9/1/2024       History     Chief Complaint   Patient presents with    Dental Pain     Pt c/o dental pain to right lower mouth. +missing and broken teeth, +pain, +swelling. Pt NAD, resp e/u, ambulatory.      Patient presents to ER for right lower dental pain, onset yesterday.  Denies any associated symptoms.  Symptoms have been constant since onset.  He has taken nothing for the symptoms.  He denies fever, chills, generalized body aches, sore throat, difficulty swallowing, weakness, fatigue    The history is provided by the patient.     Review of patient's allergies indicates:  No Known Allergies  Past Medical History:   Diagnosis Date    Anxiety     High cholesterol     Hyperlipidemia     Insomnia     Legally blind     Macular degeneration      Past Surgical History:   Procedure Laterality Date    CORONARY STENT PLACEMENT N/A 4/3/2022    Procedure: INSERTION, STENT, CORONARY ARTERY;  Surgeon: Eliud Cristina MD;  Location: [facility] CATH LAB;  Service: Cardiology;  Laterality: N/A;    HIP SURGERY      as child left    LEFT HEART CATHETERIZATION Left 4/3/2022    Procedure: CATHETERIZATION, HEART, LEFT;  Surgeon: Eliud Cristina MD;  Location: [facility] CATH LAB;  Service: Cardiology;  Laterality: Left;     Family History   Problem Relation Name Age of Onset    Macular degeneration Mother      Macular degeneration Maternal Grandfather      Hyperlipidemia Father      Diabetes Father      Heart attacks under age 50 Paternal Grandmother       Social History     Tobacco Use    Smoking status: Every Day     Current packs/day: 1.00     Types: Cigarettes    Smokeless tobacco: Never   Substance Use Topics    Alcohol use: Yes     Comment: rarely    Drug use: No     Review of Systems   Constitutional:  Negative for chills, fatigue and fever.   HENT:  Positive for dental problem. Negative for congestion, ear pain, rhinorrhea, sinus pain and sore throat.    Eyes:  Negative for pain.   Respiratory:  Negative for  cough and shortness of breath.    Cardiovascular:  Negative for chest pain.   Gastrointestinal:  Negative for abdominal pain, nausea and vomiting.   Musculoskeletal:  Negative for back pain, myalgias and neck pain.   Skin:  Negative for rash.   Neurological:  Negative for weakness and headaches.       Physical Exam     Initial Vitals [09/01/24 1918]   BP Pulse Resp Temp SpO2   (!) 159/87 65 18 98.5 °F (36.9 °C) 96 %      MAP       --         Physical Exam    Nursing note and vitals reviewed.  Constitutional: He is not diaphoretic. He is cooperative.  Non-toxic appearance. He does not have a sickly appearance. He does not appear ill. No distress.   HENT:   Head: Normocephalic and atraumatic.   Right Ear: Tympanic membrane, external ear and ear canal normal.   Left Ear: Tympanic membrane, external ear and ear canal normal.   Nose: Nose normal.   Mouth/Throat: Oropharynx is clear and moist. Dental caries present. No oropharyngeal exudate, posterior oropharyngeal edema or posterior oropharyngeal erythema.   Poor dentition.  No visible drainable dental abscess   Neck: Neck supple.   Normal range of motion.  Cardiovascular:  Normal rate and regular rhythm.           Pulmonary/Chest: Breath sounds normal. No respiratory distress.   Musculoskeletal:         General: Normal range of motion.      Cervical back: Normal range of motion and neck supple.     Neurological: He is alert and oriented to person, place, and time. He has normal strength. GCS score is 15. GCS eye subscore is 4. GCS verbal subscore is 5. GCS motor subscore is 6.   Skin: Skin is warm and dry. Capillary refill takes less than 2 seconds.         ED Course   Procedures  Labs Reviewed - No data to display       Imaging Results    None          Medications - No data to display  Medical Decision Making  Risk  OTC drugs.  Prescription drug management.                   Patient with poor dentition.  No sign of dental abscess.  States he is established with  dentistry services, instructed patient to follow-up with his dentist.  Patient requesting antibiotic, Augmentin provided.  Tylenol Rx provided.  Discussed signs symptoms to return to ER.  Patient agrees with plan and voiced no further concerns.    I discussed with patient that evaluation in the ED does not suggest any emergent or life threatening medical conditions requiring immediate intervention beyond what was provided in the ED, and I believe patient is safe for discharge. Regardless, an unremarkable evaluation in the ED does not preclude the development or presence of a serious of life threatening condition. As such, patient was instructed to return immediately for any worsening or change in current symptoms.                     Clinical Impression:  Final diagnoses:  [K08.89] Pain, dental (Primary)          ED Disposition Condition    Discharge Stable          ED Prescriptions       Medication Sig Dispense Start Date End Date Auth. Provider    amoxicillin-clavulanate 875-125mg (AUGMENTIN) 875-125 mg per tablet Take 1 tablet by mouth 2 (two) times daily. for 7 days 14 tablet 9/1/2024 9/8/2024 Michael Babin NP    acetaminophen (TYLENOL) 325 MG tablet Take 2 tablets (650 mg total) by mouth every 6 (six) hours as needed for Pain. 40 tablet 9/1/2024 9/6/2024 Michael Babin NP          Follow-up Information       Follow up With Specialties Details Why Contact Info    Follow-up with your dentist in 1 day        [facility] - Emergency Dept Emergency Medicine  As needed, If symptoms worsen [id number]  Emergency Department  [facility] [id number]  [phone]             Michael Babin NP  09/01/24 1942

## 2024-10-22 NOTE — Clinical Note
An angiography was performed of the right coronary arteries.  Continuity of Care Form    Patient Name: Melvin Caruso   :  1981  MRN:  [id number]    Admit date:  2020  Discharge date:  ***    Code Status Order: Full Code   Advance Directives:   Advance Care Flowsheet Documentation     Date/Time Healthcare Directive Type of Healthcare Directive Copy in [address] Agent's Name Healthcare Agent's Phone Number    20 1348  No, patient does not have an advance directive for healthcare treatment -- -- -- -- --          Admitting Physician:  Adrian Jean MD  PCP: Cherri May DO    Discharging Nurse: [facility] Unit/Room#: 6326/0530-Q  Discharging Unit Phone Number: ***    Emergency Contact:   Extended Emergency Contact Information  Primary Emergency Contact: [address] [employer] Phone: [phone]  Relation: Spouse    Past Surgical History:  Past Surgical History:   Procedure Laterality Date    CARPAL TUNNEL RELEASE Right 3/18/2020    RIGHT CARPAL TUNNEL RELEASE performed by Ammy French MD at [address] [facility], 5/> YRS  2020            Immunization History: There is no immunization history on file for this patient.     Active Problems:  Patient Active Problem List   Diagnosis Code    Gastroesophageal reflux disease without esophagitis K21.9    Hiatal hernia K44.9    Carpal tunnel syndrome of right wrist G56.01    Type 2 diabetes mellitus without complication, without long-term current use of insulin (HCC) E11.9    Abdominal pain R10.9    Sepsis ([address] A41.9    Liver lesion K76.9    SIRS (systemic inflammatory response syndrome) (HCC) R65.10       Isolation/Infection:   Isolation          No Isolation        Patient Infection Status     Infection Onset Added Last Indicated Last Indicated By Review Planned Expiration Resolved Resolved By    None active    Resolved    COVID-19 Rule Out 20 COVID-19 (Ordered)   20 Rule-Out Test Resulted Nurse Assessment:  Last Vital Signs: /85   Pulse 92   Temp 98.6 °F (37 °C) (Oral)   Resp 18   Ht 5' 1\" (1.549 m)   Wt 163 lb 8 oz (74.2 kg)   LMP 06/08/2020   SpO2 94%   BMI 30.89 kg/m²     Last documented pain score (0-10 scale): Pain Level: 0  Last Weight:   Wt Readings from Last 1 Encounters:   07/05/20 163 lb 8 oz (74.2 kg)     Mental Status:  {IP PT MENTAL STATUS:20030}    IV Access:  { MELE IV ACCESS:395560830}    Nursing Mobility/ADLs:  Walking   {CHP DME VMIO:423294642}  Transfer  {CHP DME VVXI:866504468}  Bathing  {CHP DME AVSZ:779663041}  Dressing  {CHP DME HFKS:368539159}  Toileting  {CHP DME OBXU:347954256}  Feeding  {P DME KORM:088257013}  Med Admin  {[facility] DME YECA:118017413}  Med Delivery   { MELE MED Delivery:713165124}    Wound Care Documentation and Therapy:        Elimination:  Continence:   · Bowel: {YES / OZ:33226}  · Bladder: {YES / BROWER:60694}  Urinary Catheter: {Urinary Catheter:709540881}   Colostomy/Ileostomy/Ileal Conduit: {YES / MW:44533}       Date of Last BM: ***    Intake/Output Summary (Last 24 hours) at 7/5/2020 1037  Last data filed at 7/5/2020 0918  Gross per 24 hour   Intake 13 ml   Output 40 ml   Net -27 ml     I/O last 3 completed shifts:   In: 15 [I.V.:13]  Out: 30 [Drains:30]    Safety Concerns:     [address] [employer] Safety Concerns:814041684}    Impairments/Disabilities:      [address] [employer] Impairments/Disabilities:712601578}    Nutrition Therapy:  Current Nutrition Therapy:   [address] [employer] Diet List:790859228}    Routes of Feeding: {CHP DME Other Feedings:879964899}  Liquids: {Slp liquid thickness:86496}  Daily Fluid Restriction: {CHP DME Yes amt example:031338696}  Last Modified Barium Swallow with Video (Video Swallowing Test): {Done Not Done XMDQ:752414479}    Treatments at the Time of Hospital Discharge:   Respiratory Treatments: ***  Oxygen Therapy:  {Therapy; copd oxygen:40435}  Ventilator:    {MH CC Vent XMUI:751078004}    Rehab Therapies: {THERAPEUTIC INTERVENTION:1547208675}  Weight Bearing Status/Restrictions: 50Adam Quintero CC Weight Bearin}  Other Medical Equipment (for information only, NOT a DME order):  {EQUIPMENT:481775835}  Other Treatments: ***    Patient's personal belongings (please select all that are sent with patient):  {CHP DME Belongings:599022458}    RN SIGNATURE:  {Esignature:322087511}    CASE MANAGEMENT/SOCIAL WORK SECTION    Inpatient Status Date: ***    Readmission Risk Assessment Score:  Readmission Risk              Risk of Unplanned Readmission:        10           Discharging to Facility/ Agency   · Name:   · Address:  · Phone:  · Fax:    Dialysis Facility (if applicable)   · Name:  · Address:  · Dialysis Schedule:  · Phone:  · Fax:    / signature: {Esignature:609467453}    PHYSICIAN SECTION    Prognosis: {Prognosis:3545483123}    Condition at Discharge: Janet Quintero Patient Condition:049268280}    Rehab Potential (if transferring to Rehab): {Prognosis:8817170333}    Recommended Labs or Other Treatments After Discharge: ***    Physician Certification: I certify the above information and transfer of [employer]  is necessary for the continuing treatment of the diagnosis listed and that she requires {Admit to Appropriate Level of Care:79703} for {GREATER/LESS:794561067} 30 days.      Update Admission H&P: {CHP DME Changes in IPPAN:715432336}    PHYSICIAN SIGNATURE:  {Esignature:011964119} details… details…

## 2024-11-07 ENCOUNTER — HOSPITAL ENCOUNTER (OUTPATIENT)
Dept: PREADMISSION TESTING | Facility: HOSPITAL | Age: 44
Discharge: HOME | End: 2024-11-07
Attending: FAMILY MEDICINE
Payer: MEDICARE

## 2024-11-07 ENCOUNTER — TELEPHONE (OUTPATIENT)
Dept: PREADMISSION TESTING | Facility: HOSPITAL | Age: 44
End: 2024-11-07
Payer: MEDICARE

## 2024-11-07 DIAGNOSIS — Z12.11 COLON CANCER SCREENING: ICD-10-CM

## 2024-11-07 NOTE — TELEPHONE ENCOUNTER
Good morning, Mr. Segal is due for a follow up visit with Dr. Stringer, which will also need clearance for a colonoscopy he has orders for. I attempted to schedule an apt for him w/ his mom but there unable to do so. They are looking for something asap. But if not it'll have to be after 12/4. I seen first avail was 12/17. Please advise and Please call back to [phone] to schedule. Thanks in advance.

## 2024-11-20 DIAGNOSIS — E78.5 HYPERLIPIDEMIA, UNSPECIFIED HYPERLIPIDEMIA TYPE: Primary | ICD-10-CM

## 2024-11-21 ENCOUNTER — HOSPITAL ENCOUNTER (OUTPATIENT)
Dept: CARDIOLOGY | Facility: HOSPITAL | Age: 44
Discharge: HOME | End: 2024-11-21
Attending: INTERNAL MEDICINE
Payer: MEDICARE

## 2024-11-21 ENCOUNTER — OFFICE VISIT (OUTPATIENT)
Dept: CARDIOLOGY | Facility: CLINIC | Age: 44
End: 2024-11-21
Payer: MEDICARE

## 2024-11-21 VITALS
HEART RATE: 63 BPM | SYSTOLIC BLOOD PRESSURE: 130 MMHG | BODY MASS INDEX: 29.18 KG/M2 | DIASTOLIC BLOOD PRESSURE: 72 MMHG | OXYGEN SATURATION: 98 % | WEIGHT: 186.31 LBS

## 2024-11-21 DIAGNOSIS — I25.5 ISCHEMIC CARDIOMYOPATHY: ICD-10-CM

## 2024-11-21 DIAGNOSIS — Z95.5 S/P CORONARY ARTERY STENT PLACEMENT: ICD-10-CM

## 2024-11-21 DIAGNOSIS — R94.30 EJECTION FRACTION < 50%: ICD-10-CM

## 2024-11-21 DIAGNOSIS — Z01.810 PREOP CARDIOVASCULAR EXAM: ICD-10-CM

## 2024-11-21 DIAGNOSIS — E78.5 HYPERLIPIDEMIA, UNSPECIFIED HYPERLIPIDEMIA TYPE: ICD-10-CM

## 2024-11-21 DIAGNOSIS — I77.89 OTHER SPECIFIED DISORDERS OF ARTERIES AND ARTERIOLES: ICD-10-CM

## 2024-11-21 DIAGNOSIS — E78.5 HYPERLIPIDEMIA, UNSPECIFIED HYPERLIPIDEMIA TYPE: Primary | ICD-10-CM

## 2024-11-21 DIAGNOSIS — I10 PRIMARY HYPERTENSION: ICD-10-CM

## 2024-11-21 DIAGNOSIS — Z72.0 TOBACCO USE: ICD-10-CM

## 2024-11-21 PROCEDURE — 99999 PR PBB SHADOW E&M-EST. PATIENT-LVL III: CPT | Mod: PBBFAC,,, | Performed by: INTERNAL MEDICINE

## 2024-11-21 PROCEDURE — 93005 ELECTROCARDIOGRAM TRACING: CPT

## 2024-11-21 PROCEDURE — 93010 ELECTROCARDIOGRAM REPORT: CPT | Mod: ,,, | Performed by: INTERNAL MEDICINE

## 2024-11-21 NOTE — PROGRESS NOTES
Subjective:   Patient ID:  Philipp Gonzales is a 44 y.o. male who presents for evaluation of Pre-op Exam        HPI   11.21.2024   Comes in for an overdue follow-up.   States he has issue with transportation due to his blindness.    He denies any chest pain.  He smokes 7 cigarettes a day.    Denies any angina, palpitations syncope or presyncope   He is going for a colonoscopy.        2.15.2023  Comes in for 6 months follow up   Denies chest pain or brennan  Compliant with meds   He reports daytime somnolence, choking sometimes wakes up, non restorative sleep, hypersomnia  Cad , htn , obesity       9.21.2022  Comes in for a follow-up.  He was supposed come back in 6 months however he was getting his echocardiogram done today and had complained to the echo technician of chest pain.    When interviewed on exam today he denies any chest pain states that he has fluttering of his heart.  States lasts only for few seconds twice a day for last week or 2.    He also reports that since I increased his Toprol last visit from 50- to 00 mg has been feeling cold and sweating and feeling shaky sometimes.    He denies any allergies but reports feeling shaky was activity.    He reports he used to drink more  energy drinks in the past but he still does every now and then,  no excessive amount of coffee  8.17.2022  Comes in for 3 month follow-up.  He is doing well.  No chest pain no dyspnea.  No bleeding.  Compliant with medications.  States that he is active without any angina.        5.2022  Patient legally blind due to macular degeneration.  40 yo male, Smoker down from 2 packs a day to 5 cigarettes a day, recent NSTEMI with PCI to LAD and RCA, reviewed angiogram   States now on healthy food , used to do a lot of fast foods   He is chest pain free, compliant with medications, no dyspnea, no leg swelling, no palpitations   EF 40% by angiogram     Past Medical History:   Diagnosis Date    Anxiety     High cholesterol     Hyperlipidemia      Insomnia     Legally blind     Macular degeneration        Past Surgical History:   Procedure Laterality Date    CORONARY STENT PLACEMENT N/A 4/3/2022    Procedure: INSERTION, STENT, CORONARY ARTERY;  Surgeon: Eliud Cristina MD;  Location: [facility] CATH LAB;  Service: Cardiology;  Laterality: N/A;    HIP SURGERY      as child left    LEFT HEART CATHETERIZATION Left 4/3/2022    Procedure: CATHETERIZATION, HEART, LEFT;  Surgeon: Eliud Cristina MD;  Location: [facility] CATH LAB;  Service: Cardiology;  Laterality: Left;       Social History     Tobacco Use    Smoking status: Every Day     Current packs/day: 1.00     Types: Cigarettes    Smokeless tobacco: Never   Substance Use Topics    Alcohol use: Yes     Comment: rarely    Drug use: No       Family History   Problem Relation Name Age of Onset    Macular degeneration Mother      Macular degeneration Maternal Grandfather      Hyperlipidemia Father      Diabetes Father      Heart attacks under age 50 Paternal Grandmother         Review of Systems   Cardiovascular:  Negative for chest pain, dyspnea on exertion, palpitations and syncope.   Genitourinary: Negative.    Neurological: Negative.        Current Outpatient Medications on File Prior to Visit   Medication Sig    aspirin (ECOTRIN) 81 MG EC tablet Take 1 tablet (81 mg total) by mouth once daily.    atorvastatin (LIPITOR) 80 MG tablet TAKE 1 TABLET BY MOUTH EVERY DAY    clopidogreL (PLAVIX) 75 mg tablet TAKE 1 TABLET BY MOUTH EVERY DAY    EScitalopram oxalate (LEXAPRO) 20 MG tablet Take 1 tablet (20 mg total) by mouth once daily.    fenofibrate (TRICOR) 54 MG tablet Take 1 tablet (54 mg total) by mouth once daily.    ferrous sulfate 325 (65 FE) MG EC tablet Take 1 tablet (325 mg total) by mouth 2 (two) times daily.    lisinopriL (PRINIVIL,ZESTRIL) 2.5 MG tablet TAKE ONE TABLET BY MOUTH EVERY DAY    metoprolol succinate (TOPROL-XL) 100 MG 24 hr tablet Take 1 tablet (100 mg total) by mouth once daily.    traZODone  (DESYREL) 100 MG tablet Take 1 tablet (100 mg total) by mouth every evening.    betamethasone valerate 0.1% (VALISONE) 0.1 % Crea Apply topically 2 (two) times daily. for 14 days     No current facility-administered medications on file prior to visit.       Objective:   Objective:  Wt Readings from Last 3 Encounters:   11/21/24 84.5 kg (186 lb 4.6 oz)   09/01/24 85.6 kg (188 lb 13.2 oz)   07/17/24 83.5 kg (184 lb 1.4 oz)     Temp Readings from Last 3 Encounters:   09/01/24 98.5 °F (36.9 °C) (Oral)   07/17/24 97.9 °F (36.6 °C)   07/05/24 98.2 °F (36.8 °C) (Oral)     BP Readings from Last 3 Encounters:   11/21/24 130/72   09/01/24 (!) 159/87   07/17/24 (!) 110/58     Pulse Readings from Last 3 Encounters:   11/21/24 63   09/01/24 65   07/17/24 80       Physical Exam  Vitals reviewed.   Constitutional:       Appearance: He is well-developed.   Neck:      Vascular: No carotid bruit.   Cardiovascular:      Rate and Rhythm: Normal rate and regular rhythm.      Pulses: Intact distal pulses.      Heart sounds: Normal heart sounds. No murmur heard.  Pulmonary:      Breath sounds: Normal breath sounds.   Neurological:      Mental Status: He is oriented to person, place, and time.         Lab Results   Component Value Date    CHOL 75 (L) 07/17/2024    CHOL 79 (L) 07/19/2023    CHOL 191 04/03/2022     Lab Results   Component Value Date    HDL 30 (L) 07/17/2024    HDL 29 (L) 07/19/2023    HDL 27 (L) 04/03/2022     Lab Results   Component Value Date    LDLCALC 17.6 (L) 07/17/2024    LDLCALC 32.8 (L) 07/19/2023    LDLCALC 109.6 04/03/2022     Lab Results   Component Value Date    TRIG 137 07/17/2024    TRIG 86 07/19/2023    TRIG 272 (H) 04/03/2022     Lab Results   Component Value Date    CHOLHDL 40.0 07/17/2024    CHOLHDL 36.7 07/19/2023    CHOLHDL 14.1 (L) 04/03/2022       Chemistry        Component Value Date/Time     07/17/2024 0850    K 4.3 07/17/2024 0850     07/17/2024 0850    CO2 23 07/17/2024 0850    BUN 11  07/17/2024 0850    CREATININE 1.0 07/17/2024 0850    GLU 98 07/17/2024 0850        Component Value Date/Time    CALCIUM 9.6 07/17/2024 0850    ALKPHOS 78 07/17/2024 0850    AST 16 07/17/2024 0850    ALT 12 07/17/2024 0850    BILITOT 0.2 07/17/2024 0850    ESTGFRAFRICA >60 04/04/2022 0909    EGFRNONAA >60 04/04/2022 0909          Lab Results   Component Value Date    TSH 0.866 09/21/2022     Lab Results   Component Value Date    INR 1.1 04/02/2022     Lab Results   Component Value Date    WBC 8.11 07/19/2024    HGB 10.4 (L) 07/19/2024    HCT 35.9 (L) 07/19/2024    MCV 72 (L) 07/19/2024     07/19/2024     BNP  @LABRCNTIP(BNP,BNPTRIAGEBLO)@  CrCl cannot be calculated (Patient's most recent lab result is older than the maximum 7 days allowed.).     Imaging:  ======  Results for orders placed during the hospital encounter of 04/02/22    Echo    Interpretation Summary  · Concentric hypertrophy and moderately decreased systolic function.  · Mild tricuspid regurgitation.  · The estimated ejection fraction is 30%.  · Grade I left ventricular diastolic dysfunction.  · There are segmental left ventricular wall motion abnormalities.  · With normal right ventricular systolic function.  · Normal central venous pressure (3 mmHg).  · The estimated PA systolic pressure is 28 mmHg.    No results found for this or any previous visit.    No results found for this or any previous visit.    No results found for this or any previous visit.    No results found for this or any previous visit.    No valid procedures specified.    Diagnostic Results:  ECG: Reviewed    Results for orders placed during the hospital encounter of 09/21/22    Echo    Interpretation Summary  · The left ventricle is normal in size with mildly decreased systolic function.  · The estimated ejection fraction is 45%.  · Grade I left ventricular diastolic dysfunction.  · There is mild left ventricular global hypokinesis.  · Normal right ventricular size with  "normal right ventricular systolic function.  · Mild tricuspid regurgitation.  · Intermediate central venous pressure (8 mmHg).  · The estimated PA systolic pressure is 26 mmHg.    Results for orders placed during the hospital encounter of 04/02/22    Cardiac catheterization    Conclusion  · The ejection fraction was calculated to be 40%.  · The pre-procedure left ventricular end diastolic pressure was 34.  · The post-procedure left ventricular end diastolic pressure was 35.  · The Prox LAD-1 lesion was 90% stenosed with 0% stenosis post-intervention.  · The Prox LAD-2 lesion was 90% stenosed with 0% stenosis post-intervention.  · The 1st Diag lesion was 70% stenosed with 0% stenosis post-intervention.  · The Dist RCA lesion was 80% stenosed with 0% stenosis post-intervention.  · A stent was successfully placed.  · A STENT RESOLUTE SCOTT [id number] stent was successfully placed at 14 AMBROCIO for 20 sec.  · A stent was successfully placed.  · The estimated blood loss was none.  · The PCI was successful.    The procedure log was documented by Documenter: Lili Tyler RN and verified by Eliud Cristina MD.    Date: 4/4/2022  Time: 4:38 AM     Holter  One episode of possible svt on his hm but asymptomatic  " The patient's rhythm included 15 hr 25 min 43 sec of tachycardia. The fastest single episode of tachycardia occurred at 7:54:31 AM D1, lasting 13 min 32 sec, with maximum heart rate of 152 BPM. "     The ASCVD Risk score (Rain CARTER, et al., 2019) failed to calculate for the following reasons:    Risk score cannot be calculated because patient has a medical history suggesting prior/existing ASCVD    Assessment and Plan:   Hyperlipidemia, unspecified hyperlipidemia type    S/P coronary artery stent placement  -     CV Ultrasound Bilateral Doppler Carotid; Future    Tobacco use    Primary hypertension    Ejection fraction < 50%    Ischemic cardiomyopathy    Other specified disorders of arteries and arterioles  -     CV " Ultrasound Bilateral Doppler Carotid; Future    Preop cardiovascular exam       Okay to undergo his colonoscopy from cardiac standpoint.  EKG nonischemic today unchanged from prior  CAD- antiplatelets statin beta-blockers.    Hypertension controlled.    Smoking counseled still smoking.    Hyperlipidemia on statins.    Intermediate  ischemic cardiomyopathy EF 45% on beta-blockers.  And lisinopril.  Euvolemic   Okay to hold aspirin and Plavix for 5 days prior to his colonoscopy  Check Nonurgent carotid ultrasound given history of CAD    Continue with dapt, statin, lisinopril.  Reviewed all tests and above medical conditions with patient in detail and formulated treatment plan.  Risk factor modification discussed.   Cardiac low salt diet discussed.  Maintaining healthy weight and weight loss goals were discussed in clinic.  Counseled on smoking,  he is doing 7 cigarettes a day.  Down from 2 packs in the past  Follow up in 12 months per patient request

## 2024-11-22 LAB
OHS QRS DURATION: 98 MS
OHS QTC CALCULATION: 401 MS

## 2024-12-09 ENCOUNTER — HOSPITAL ENCOUNTER (OUTPATIENT)
Dept: CARDIOLOGY | Facility: HOSPITAL | Age: 44
Discharge: HOME | End: 2024-12-09
Attending: INTERNAL MEDICINE
Payer: MEDICARE

## 2024-12-09 VITALS
WEIGHT: 186 LBS | BODY MASS INDEX: 29.19 KG/M2 | HEIGHT: 67 IN | SYSTOLIC BLOOD PRESSURE: 111 MMHG | DIASTOLIC BLOOD PRESSURE: 68 MMHG

## 2024-12-09 DIAGNOSIS — Z95.5 S/P CORONARY ARTERY STENT PLACEMENT: ICD-10-CM

## 2024-12-09 DIAGNOSIS — I77.89 OTHER SPECIFIED DISORDERS OF ARTERIES AND ARTERIOLES: ICD-10-CM

## 2024-12-09 LAB
LEFT ARM DIASTOLIC BLOOD PRESSURE: 65 MMHG
LEFT ARM SYSTOLIC BLOOD PRESSURE: 112 MMHG
LEFT CBA DIAS: 25 CM/S
LEFT CBA SYS: 105 CM/S
LEFT CCA DIST DIAS: 26 CM/S
LEFT CCA DIST SYS: 122 CM/S
LEFT CCA MID DIAS: 30 CM/S
LEFT CCA MID SYS: 131 CM/S
LEFT CCA PROX DIAS: 25 CM/S
LEFT CCA PROX SYS: 139 CM/S
LEFT ECA DIAS: 11 CM/S
LEFT ECA SYS: 111 CM/S
LEFT ICA DIST DIAS: 34 CM/S
LEFT ICA DIST SYS: 96 CM/S
LEFT ICA MID DIAS: 29 CM/S
LEFT ICA MID SYS: 83 CM/S
LEFT ICA PROX DIAS: 27 CM/S
LEFT ICA PROX SYS: 127 CM/S
LEFT VERTEBRAL DIAS: 17 CM/S
LEFT VERTEBRAL SYS: 63 CM/S
OHS CV CAROTID RIGHT ICA EDV HIGHEST: 31
OHS CV CAROTID ULTRASOUND LEFT ICA/CCA RATIO: 1.04
OHS CV CAROTID ULTRASOUND RIGHT ICA/CCA RATIO: 1.15
OHS CV PV CAROTID LEFT HIGHEST CCA: 139
OHS CV PV CAROTID LEFT HIGHEST ICA: 127
OHS CV PV CAROTID RIGHT HIGHEST CCA: 121
OHS CV PV CAROTID RIGHT HIGHEST ICA: 124
OHS CV US CAROTID LEFT HIGHEST EDV: 34
RIGHT ARM DIASTOLIC BLOOD PRESSURE: 68 MMHG
RIGHT ARM SYSTOLIC BLOOD PRESSURE: 111 MMHG
RIGHT CBA DIAS: 17 CM/S
RIGHT CBA SYS: 76 CM/S
RIGHT CCA DIST DIAS: 22 CM/S
RIGHT CCA DIST SYS: 108 CM/S
RIGHT CCA MID DIAS: 23 CM/S
RIGHT CCA MID SYS: 121 CM/S
RIGHT CCA PROX DIAS: 22 CM/S
RIGHT CCA PROX SYS: 119 CM/S
RIGHT ECA DIAS: 18 CM/S
RIGHT ECA SYS: 117 CM/S
RIGHT ICA DIST DIAS: 31 CM/S
RIGHT ICA DIST SYS: 97 CM/S
RIGHT ICA MID DIAS: 30 CM/S
RIGHT ICA MID SYS: 91 CM/S
RIGHT ICA PROX DIAS: 21 CM/S
RIGHT ICA PROX SYS: 124 CM/S
RIGHT VERTEBRAL DIAS: 15 CM/S
RIGHT VERTEBRAL SYS: 54 CM/S

## 2024-12-09 PROCEDURE — 93880 EXTRACRANIAL BILAT STUDY: CPT | Mod: 26,,, | Performed by: INTERNAL MEDICINE

## 2024-12-09 PROCEDURE — 93880 EXTRACRANIAL BILAT STUDY: CPT | Mod: PO

## 2025-05-02 ENCOUNTER — TELEPHONE (OUTPATIENT)
Dept: INTERNAL MEDICINE | Facility: CLINIC | Age: 45
End: 2025-05-02
Payer: MEDICARE

## 2025-05-02 NOTE — TELEPHONE ENCOUNTER
----- Message from Miesha sent at 5/2/2025  8:09 AM CDT -----  Contact: Philipp  Type:  Needs Medical AdviceWho Called: PhilippWould the patient rather a call back or a response via [facility]? Call Nancy Call Back Number: [phone] Information: Questions about getting his colonoscopy.

## 2025-05-02 NOTE — TELEPHONE ENCOUNTER
Returned call to patient, who stated he was told that he would need an appointment to make sure he can have a coloscopy due to him having a heart attack/ stroke and to contact our office for the process.    I am not sure what he is talking about

## 2025-05-06 ENCOUNTER — HOSPITAL ENCOUNTER (OUTPATIENT)
Dept: PREADMISSION TESTING | Facility: HOSPITAL | Age: 45
Discharge: HOME | End: 2025-05-06
Attending: FAMILY MEDICINE
Payer: MEDICARE

## 2025-05-06 DIAGNOSIS — Z12.11 COLON CANCER SCREENING: Primary | ICD-10-CM

## 2025-05-06 RX ORDER — SODIUM, POTASSIUM,MAG SULFATES 17.5-3.13G
1 SOLUTION, RECONSTITUTED, ORAL ORAL DAILY
Qty: 1 KIT | Refills: 0 | Status: SHIPPED | OUTPATIENT
Start: 2025-05-06 | End: 2025-05-08

## 2025-05-14 ENCOUNTER — E-CONSULT (OUTPATIENT)
Dept: CARDIOLOGY | Facility: CLINIC | Age: 45
End: 2025-05-14
Payer: MEDICARE

## 2025-05-14 DIAGNOSIS — Z01.810 PREOP CARDIOVASCULAR EXAM: Primary | ICD-10-CM

## 2025-05-14 NOTE — CONSULTS
[facility] - Cardiology  Response for E-Consult     Patient Name: Philipp Gonzales  MRN: [id number]  Primary Care Provider: Luana Hall NP   Requesting Provider: Mari Kimball*  E-Consult to General Cardiology  Consult performed by: Drew Turk MD  Consult ordered by: Mari Kimball, ISELA          E consult for preop clearance of colonoscopy  The chart reviewed.  PMH CAD s/p PCI in 2022, HTN HLD  11.24 EKG reviewed by myself today reveals NSR nonspecific LVH and STT change    Plan  Elevated periop risk of CV events for non-high risk procedure.  Ok to proceed the scheduled procedure without further cardiac study.  OK to hold ASA and Plavix 5 days before the procedure and resume postop once hemodynamically stable      Total time of Consultation: 10 minute    I did not speak to the requesting provider verbally about this.     *This eConsult is based on the clinical data available to me and is furnished without benefit of a physical examination. The eConsult will need to be interpreted in light of any clinical issues or changes in patient status not available to me at the time of filing this eConsults. Significant changes in patient condition or level of acuity should result in immediate formal consultation and reevaluation. Please alert me if you have further questions.    Thank you for this eConsult referral.     MD Joao HorneUF Health [facility] Cardiology

## 2025-05-16 ENCOUNTER — ANESTHESIA EVENT (OUTPATIENT)
Dept: ENDOSCOPY | Facility: HOSPITAL | Age: 45
End: 2025-05-16
Payer: MEDICARE

## 2025-05-16 NOTE — ANESTHESIA PREPROCEDURE EVALUATION
05/16/2025  Philipp Gonzales is a 44 y.o., male.  Past Medical History:   Diagnosis Date    Anxiety     Essential (primary) hypertension     High cholesterol     Hyperlipidemia     Insomnia     Legally blind     Macular degeneration     NSTEMI (non-ST elevated myocardial infarction) 04/03/2022 11/2024 EKG    Vent. Rate :  70 BPM     Atrial Rate :  70 BPM      P-R Int : 144 ms          QRS Dur :  98 ms       QT Int : 372 ms       P-R-T Axes :  53  63  62 degrees     QTcB Int : 401 ms     Normal sinus rhythm   Minimal voltage criteria for LVH, may be normal variant ( Sokolow-Parish )   Cannot rule out Anterior infarct ,age undetermined   Abnormal ECG   When compared with ECG of 02-Apr-2022 18:34,   Vent. rate has decreased by  35 bpm   Left bundle branch block is no longer Present   Minimal criteria for Anterior infarct are now Present   Confirmed by Drew Turk     8/2022 Echo    Summary    The left ventricle is normal in size with mildly decreased systolic function.  The estimated ejection fraction is 45%.  Grade I left ventricular diastolic dysfunction.  There is mild left ventricular global hypokinesis.  Normal right ventricular size with normal right ventricular systolic function.  Mild tricuspid regurgitation.  Intermediate central venous pressure (8 mmHg).  The estimated PA systolic pressure is 26 mmHg.      Pre-op Assessment    I have reviewed the Patient Summary Reports.     I have reviewed the Nursing Notes. I have reviewed the NPO Status.   I have reviewed the Medications.     Review of Systems  Anesthesia Hx:  No problems with previous Anesthesia             Denies Family Hx of Anesthesia complications.    Denies Personal Hx of Anesthesia complications.                    Social:  Smoker, Alcohol Use       EENT/Dental:   Legally blind          Cardiovascular:     Hypertension  Past MI CAD    CABG/stent        hyperlipidemia    S/p NSTEMI 2022, LHC w PCI/stent, 2023 EF 45% per echo, carotid stenosis                           Hepatic/GI:  Bowel Prep.   GERD                Psych:  Psychiatric History anxiety                 Physical Exam  General: Well nourished, Cooperative, Alert and Oriented    Airway:  Mallampati: II   Mouth Opening: Normal  TM Distance: Normal  Tongue: Normal  Neck ROM: Normal ROM    Dental:  Intact        Anesthesia Plan  Type of Anesthesia, risks & benefits discussed:    Anesthesia Type: Gen Natural Airway  Intra-op Monitoring Plan: Standard ASA Monitors  Post Op Pain Control Plan: multimodal analgesia  Induction:  IV  Informed Consent: Informed consent signed with the Patient and all parties understand the risks and agree with anesthesia plan.  All questions answered. Patient consented to blood products? No  ASA Score: 3  Day of Surgery Review of History & Physical: H&P Update referred to the surgeon/provider.    Ready For Surgery From Anesthesia Perspective.     .

## 2025-05-20 ENCOUNTER — ANESTHESIA (OUTPATIENT)
Dept: ENDOSCOPY | Facility: HOSPITAL | Age: 45
End: 2025-05-20
Payer: MEDICARE

## 2025-05-20 ENCOUNTER — PATIENT MESSAGE (OUTPATIENT)
Dept: GASTROENTEROLOGY | Facility: HOSPITAL | Age: 45
End: 2025-05-20
Payer: MEDICARE

## 2025-05-20 ENCOUNTER — HOSPITAL ENCOUNTER (OUTPATIENT)
Dept: ENDOSCOPY | Facility: HOSPITAL | Age: 45
Discharge: HOME | End: 2025-05-20
Attending: NURSE PRACTITIONER
Payer: MEDICARE

## 2025-05-20 DIAGNOSIS — I21.4 NSTEMI (NON-ST ELEVATED MYOCARDIAL INFARCTION): ICD-10-CM

## 2025-05-20 DIAGNOSIS — Z12.11 SPECIAL SCREENING FOR MALIGNANT NEOPLASMS, COLON: Primary | ICD-10-CM

## 2025-05-20 DIAGNOSIS — Z12.11 COLON CANCER SCREENING: ICD-10-CM

## 2025-05-20 PROCEDURE — 37000008 HC ANESTHESIA 1ST 15 MINUTES

## 2025-05-20 PROCEDURE — 37000009 HC ANESTHESIA EA ADD 15 MINS

## 2025-05-20 PROCEDURE — 63600175 PHARM REV CODE 636 W HCPCS: Performed by: NURSE ANESTHETIST, CERTIFIED REGISTERED

## 2025-05-20 RX ORDER — PROPOFOL 10 MG/ML
INJECTION, EMULSION INTRAVENOUS
Status: DISCONTINUED | OUTPATIENT
Start: 2025-05-20 | End: 2025-05-20

## 2025-05-20 RX ORDER — LISINOPRIL 2.5 MG/1
2.5 TABLET ORAL
Qty: 90 TABLET | Refills: 3 | Status: SHIPPED | OUTPATIENT
Start: 2025-05-20

## 2025-05-20 RX ORDER — SODIUM CHLORIDE, SODIUM LACTATE, POTASSIUM CHLORIDE, CALCIUM CHLORIDE 600; 310; 30; 20 MG/100ML; MG/100ML; MG/100ML; MG/100ML
INJECTION, SOLUTION INTRAVENOUS CONTINUOUS PRN
Status: DISCONTINUED | OUTPATIENT
Start: 2025-05-20 | End: 2025-05-20

## 2025-05-20 RX ORDER — LIDOCAINE HYDROCHLORIDE 20 MG/ML
INJECTION INTRAVENOUS
Status: DISCONTINUED | OUTPATIENT
Start: 2025-05-20 | End: 2025-05-20

## 2025-05-20 RX ADMIN — PROPOFOL 100 MG: 10 INJECTION, EMULSION INTRAVENOUS at 12:05

## 2025-05-20 RX ADMIN — SODIUM CHLORIDE, SODIUM LACTATE, POTASSIUM CHLORIDE, AND CALCIUM CHLORIDE: .6; .31; .03; .02 INJECTION, SOLUTION INTRAVENOUS at 12:05

## 2025-05-20 RX ADMIN — LIDOCAINE HYDROCHLORIDE 100 MG: 20 INJECTION INTRAVENOUS at 12:05

## 2025-05-20 RX ADMIN — PROPOFOL 50 MG: 10 INJECTION, EMULSION INTRAVENOUS at 12:05

## 2025-05-20 NOTE — H&P
[facility] [place] [facility]  Gastroenterology  H&P    Patient Name: Philipp Gonzales  MRN: [id number]  Admission Date: 5/20/2025  Code Status: Prior    Attending Provider: Yeyo Pulido MD  Primary Care Physician: Luana Hall NP  Principal Problem:Special screening for malignant neoplasms, colon    Subjective:     History of Present Illness/Reasons for procedure(s):   1. Special screening for malignant neoplasms, colon    2. Colon cancer screening          Past Medical History:   Diagnosis Date    Anxiety     Essential (primary) hypertension     High cholesterol     Hyperlipidemia     Insomnia     Legally blind     Macular degeneration     NSTEMI (non-ST elevated myocardial infarction) 04/03/2022       Medications Ordered Prior to Encounter[1]    Past Surgical History:   Procedure Laterality Date    CORONARY STENT PLACEMENT N/A 4/3/2022    Procedure: INSERTION, STENT, CORONARY ARTERY;  Surgeon: Eliud Cristina MD;  Location: [facility] CATH LAB;  Service: Cardiology;  Laterality: N/A;    HIP SURGERY      as child left    LEFT HEART CATHETERIZATION Left 4/3/2022    Procedure: CATHETERIZATION, HEART, LEFT;  Surgeon: Eliud Cristina MD;  Location: [facility] CATH LAB;  Service: Cardiology;  Laterality: Left;       Review of patient's allergies indicates:  No Known Allergies  Family History       Problem Relation (Age of Onset)    Diabetes Father    Heart attacks under age 50 Paternal Grandmother    Hyperlipidemia Father    Macular degeneration Mother, Maternal Grandfather          Tobacco Use    Smoking status: Every Day     Current packs/day: 1.00     Types: Cigarettes    Smokeless tobacco: Never   Substance and Sexual Activity    Alcohol use: Yes     Comment: rarely    Drug use: No    Sexual activity: Yes     Partners: Female       Objective:     Vital Signs (Most Recent):  Temp: 98 °F (36.7 °C) (05/20/25 1033)  Pulse: 73 (05/20/25 1033)  Resp: 16 (05/20/25 1033)  BP: (!) 150/86 (05/20/25 1033)  SpO2: 96 %  (05/20/25 1033) Vital Signs (24h Range):  Temp:  [98 °F (36.7 °C)] 98 °F (36.7 °C)  Pulse:  [73] 73  Resp:  [16] 16  SpO2:  [96 %] 96 %  BP: (150)/(86) 150/86     Weight: 89.9 kg (198 lb 4.9 oz) (05/20/25 1033)  Body mass index is 31.06 kg/m².    No intake or output data in the 24 hours ending 05/20/25 1123    Lines/Drains/Airways       Peripheral Intravenous Line  Duration                  Peripheral IV - Single Lumen 05/20/25 1039 22 G Posterior;Right Hand <1 day                    Physical Exam  Vitals and nursing note reviewed.   Constitutional:       Appearance: He is well-developed.   HENT:      Head: Normocephalic and atraumatic.   Eyes:      Conjunctiva/sclera: Conjunctivae normal.      Pupils: Pupils are equal, round, and reactive to light.   Neck:      Thyroid: No thyromegaly.      Trachea: No tracheal deviation.   Cardiovascular:      Rate and Rhythm: Normal rate and regular rhythm.   Pulmonary:      Effort: Pulmonary effort is normal.      Breath sounds: Normal breath sounds. No wheezing or rales.   Chest:      Chest wall: No tenderness.   Abdominal:      General: Bowel sounds are normal. There is no distension.      Palpations: Abdomen is soft. There is no mass.      Tenderness: There is no abdominal tenderness. There is no guarding.   Musculoskeletal:         General: Normal range of motion.      Cervical back: Normal range of motion and neck supple.   Lymphadenopathy:      Cervical: No cervical adenopathy.   Skin:     General: Skin is warm and dry.   Neurological:      Mental Status: He is alert and oriented to person, place, and time.      Cranial Nerves: No cranial nerve deficit.   Psychiatric:         Behavior: Behavior normal.             Latest Ref Rng & Units 4/3/2022     2:45 AM 4/4/2022    12:18 AM 4/4/2022     9:09 AM 7/19/2023    11:30 AM 7/26/2023     9:31 AM 7/17/2024     8:50 AM 7/19/2024    10:25 AM   Lab Summary   Hemoglobin 14.0 - 18.0 g/dL 16.4  14.5  (None) (None) (None) 9.6  10.4     Hematocrit 40.0 - 54.0 % 49.2  42.1  (None) (None) (None) 33.4  35.9    White count 3.90 - 12.70 K/uL 10.00  8.26  (None) (None) (None) 7.71  8.11    Platelet count 150 - 450 K/uL 300  231  (None) (None) (None) 318  330    Sodium 136 - 145 mmol/L 140  (None) 138  140  (None) 138  (None)   Potassium 3.5 - 5.1 mmol/L 4.5  (None) 4.1  5.3  5.1  4.3  (None)   Calcium 8.7 - 10.5 mg/dL 9.4  (None) 9.9  9.8  (None) 9.6  (None)   Phosphorus  (None) (None) (None) (None) (None) (None) (None)   Creatinine 0.5 - 1.4 mg/dL 0.9  (None) 0.8  0.9  (None) 1.0  (None)   AST 10 - 40 U/L (None) (None) (None) 16  (None) 16  (None)   Alk Phos 55 - 135 U/L (None) (None) (None) 81  (None) 78  (None)   Bilirubin 0.1 - 1.0 mg/dL (None) (None) (None) 0.6  (None) 0.2  (None)   Glucose 70 - 110 mg/dL 97  (None) 109  100  (None) 98  (None)   Cholesterol 120 - 199 mg/dL 191  (None) (None) 79  (None) 75  (None)   HDL cholesterol 40 - 75 mg/dL 27  (None) (None) 29  (None) 30  (None)   Triglycerides 30 - 150 mg/dL 272  (None) (None) 86  (None) 137  (None)   LDL calc  (None) (None) (None) (None) (None) (None) (None)   Total protein 6.0 - 8.4 g/dL (None) (None) (None) 7.1  (None) 7.2  (None)   Albumin 3.5 - 5.2 g/dL (None) (None) (None) 3.9  (None) 3.6  (None)   A1C  (None) (None) (None) (None) (None) (None) (None)   PSA Screen  (None) (None) (None) (None) (None) (None) (None)        Assessment/Plan:     Active Diagnoses:    Diagnosis Date Noted POA    PRINCIPAL PROBLEM:  Special screening for malignant neoplasms, colon [Z12.11] 05/20/2025 Not Applicable      Problems Resolved During this Admission:       Risks, benefits and alternative options were discussed with the patient. Risks including but not limited to infection, bleeding, heart or respiratory problems and perforation that may require surgery were all explained to the patient. The patient had a chance for questions if there were doubts or concerns about the test. The referring provider will  be notified that our consultation is complete and available through the patient's records.    Thanks for letting us participate in the care of this nice patient,      Yeyo Pulido MD  Gastroenterology  [facility] [place] [facility] 1st Fl         [1]   Current Outpatient Medications on File Prior to Encounter   Medication Sig Dispense Refill    lisinopriL (PRINIVIL,ZESTRIL) 2.5 MG tablet TAKE ONE TABLET BY MOUTH EVERY DAY 90 tablet 3    metoprolol succinate (TOPROL-XL) 100 MG 24 hr tablet Take 1 tablet (100 mg total) by mouth once daily. 90 tablet 3    aspirin (ECOTRIN) 81 MG EC tablet Take 1 tablet (81 mg total) by mouth once daily. 90 tablet 3    atorvastatin (LIPITOR) 80 MG tablet TAKE 1 TABLET BY MOUTH EVERY DAY 90 tablet 3    betamethasone valerate 0.1% (VALISONE) 0.1 % Crea Apply topically 2 (two) times daily. for 14 days 45 g 0    clopidogreL (PLAVIX) 75 mg tablet TAKE 1 TABLET BY MOUTH EVERY DAY 90 tablet 3    EScitalopram oxalate (LEXAPRO) 20 MG tablet Take 1 tablet (20 mg total) by mouth once daily. 90 tablet 3    fenofibrate (TRICOR) 54 MG tablet Take 1 tablet (54 mg total) by mouth once daily. 90 tablet 3    ferrous sulfate 325 (65 FE) MG EC tablet Take 1 tablet (325 mg total) by mouth 2 (two) times daily. 180 tablet 0    traZODone (DESYREL) 100 MG tablet Take 1 tablet (100 mg total) by mouth every evening. 90 tablet 2     No current facility-administered medications on file prior to encounter.

## 2025-05-20 NOTE — TRANSFER OF CARE
"Anesthesia Transfer of Care Note    Patient: Philipp Gonzales    Procedure(s) Performed: * No procedures listed *    Patient location: PACU    Anesthesia Type: general    Transport from OR: Transported from OR on room air with adequate spontaneous ventilation    Post pain: adequate analgesia    Post assessment: no apparent anesthetic complications    Post vital signs: stable    Level of consciousness: awake    Nausea/Vomiting: no nausea/vomiting    Complications: none    Transfer of care protocol was followed      Last vitals: Visit Vitals  BP (!) 150/86 (BP Location: Right arm, Patient Position: Sitting)   Pulse 73   Temp 36.7 °C (98 °F) (Temporal)   Resp 16   Ht 5' 7" (1.702 m)   Wt 89.9 kg (198 lb 4.9 oz)   SpO2 96%   BMI 31.06 kg/m²     "

## 2025-05-20 NOTE — DISCHARGE INSTRUCTIONS
05/20/2025    Dear Philipp Gonzales,     Below are important post-procedure care instructions to help ensure a smooth recovery.    General Post-Procedure Care    ? Discharge: You have been discharged home in stable condition.  ? Supervision: A responsible adult should stay with you for the next 12-24 hours.  ? Activity Restrictions:    You may feel drowsy due to sedation; avoid driving, operating heavy machinery, making important decisions, or drinking alcohol for the rest of the day.  Resume normal activities tomorrow unless otherwise directed by your doctor.  Diet & Hydration    ? After Upper Endoscopy (EGD):    Start with clear liquids and soft foods. You may gradually return to your normal diet as tolerated.  Avoid hot, spicy, or acidic foods for the rest of the day to prevent throat irritation.    ? After Colonoscopy:    Begin with a light meal and plenty of fluids.  Avoid heavy, greasy, or spicy foods for the first 24 hours to minimize stomach upset.  Resume fiber intake gradually to avoid bloating or discomfort.  Common Post-Procedure Symptoms    It is normal to experience:  ? Mild sore throat or hoarseness (should improve within 24 hours).  ? Bloating, gas, or mild cramping due to air introduced during the colonoscopy.  ? Some fatigue or grogginess from sedation.  ? A small amount of rectal bleeding (especially if polyps were removed).    Warning Signs - When to Call    Please seek immediate medical attention or call [insert emergency contact number] if you experience:  ?? Severe or persistent chest or abdominal pain.  ?? Difficulty breathing or swallowing.  ?? Vomiting blood or passing large amounts of blood in stool.  ?? Fever over 101°F or signs of infection.  ?? Severe dizziness or fainting.  ?? Persistent nausea or vomiting.    Follow-Up & Results    ?? If biopsies were taken or polyps removed, we will contact you with results via [email or web address] or via phone call.  ?? If you have any questions or  "concerns, please contact our office at [phone].    We recommend the following:  Psyllium husk daily.  Magnesium Glycinate daily.  Ground Flaxseed daily.  Probiotics (Florastor or align) daily.     We genuinely value your feedback and believe that it plays a crucial role in our pursuit of excellence. We kindly request you to take a few minutes of your time to share your experience with us by posting a [employer] review. Your honest thoughts and opinions can make a significant difference for others seeking healthcare services and will help us better understand how we can further enhance our patient care.    Thank you for trusting us with your care,          Yeyo Pulido M.D.  click on the link for contact information.           At [facility] we offer virtual visits. Please use your [facility] rosalind to schedule a virtual visit.     To rate your experience with Dr. Pulido, click on this link    To post a review, simply follow these quick steps:  1. Visit [place] [employer] or search for my name on Google.  2. Click on the "Write a review" button on the left-hand side of the page.  3. Rate your experience by choosing the number of stars that reflect your satisfaction.  4. Share your thoughts and insights about your visit - we'd love to hear your feedback!        "

## 2025-05-20 NOTE — ANESTHESIA POSTPROCEDURE EVALUATION
Anesthesia Post Evaluation    Patient: Philipp Gonzales    Procedure(s) Performed: * No procedures listed *    Final Anesthesia Type: general      Patient location during evaluation: PACU  Patient participation: Yes- Able to Participate  Level of consciousness: awake  Post-procedure vital signs: reviewed and stable  Pain management: adequate  Airway patency: patent    PONV status at discharge: No PONV  Anesthetic complications: no      Cardiovascular status: stable  Respiratory status: unassisted  Hydration status: euvolemic  Follow-up not needed.              Vitals Value Taken Time   /69 05/20/25 13:03   Temp 36.7 °C (98 °F) 05/20/25 12:25   Pulse 65 05/20/25 13:03   Resp 29 05/20/25 13:03   SpO2 97 % 05/20/25 13:03   Vitals shown include unfiled device data.      No case tracking events are documented in the log.      Pain/Gabriela Score: Gabriela Score: 10 (5/20/2025 12:50 PM)

## 2025-05-20 NOTE — BRIEF OP NOTE
Endoscopy Discharge Summary      Admit Date: 5/20/2025    Discharge Date and Time:  5/20/2025 12:28 PM    Attending Physician: Yeyo Pulido     Discharge Physician: Yeyo Pulido      Principal Admitting Diagnoses: Special screening for malignant neoplasms, colon         Discharge Diagnosis: The primary encounter diagnosis was Special screening for malignant neoplasms, colon. A diagnosis of Colon cancer screening was also pertinent to this visit.     Discharged Condition: Good    Indication for Admission: Special screening for malignant neoplasms, colon     Hospital Course: Patient was admitted for an inpatient procedure and tolerated the procedure well with no complications.    Significant Diagnostic Studies:  COLON    Pathology (if any):  Specimen (24h ago, onward)      None            Disposition: Home.    Bleeding: None    No Implants         Follow Up/Patient Instructions:   Patient's Medications   New Prescriptions    No medications on file   Previous Medications    ASPIRIN (ECOTRIN) 81 MG EC TABLET    Take 1 tablet (81 mg total) by mouth once daily.    ATORVASTATIN (LIPITOR) 80 MG TABLET    TAKE 1 TABLET BY MOUTH EVERY DAY    BETAMETHASONE VALERATE 0.1% (VALISONE) 0.1 % CREA    Apply topically 2 (two) times daily. for 14 days    CLOPIDOGREL (PLAVIX) 75 MG TABLET    TAKE 1 TABLET BY MOUTH EVERY DAY    ESCITALOPRAM OXALATE (LEXAPRO) 20 MG TABLET    Take 1 tablet (20 mg total) by mouth once daily.    FENOFIBRATE (TRICOR) 54 MG TABLET    Take 1 tablet (54 mg total) by mouth once daily.    FERROUS SULFATE 325 (65 FE) MG EC TABLET    Take 1 tablet (325 mg total) by mouth 2 (two) times daily.    LISINOPRIL (PRINIVIL,ZESTRIL) 2.5 MG TABLET    TAKE ONE TABLET BY MOUTH EVERY DAY    METOPROLOL SUCCINATE (TOPROL-XL) 100 MG 24 HR TABLET    Take 1 tablet (100 mg total) by mouth once daily.    TRAZODONE (DESYREL) 100 MG TABLET    Take 1 tablet (100 mg total) by mouth every evening.   Modified Medications    No medications  on file   Discontinued Medications    No medications on file       Discharge Procedure Orders   Diet general     No dressing needed     Call MD for:  temperature >100.4     Call MD for:  persistent nausea and vomiting     Call MD for:  severe uncontrolled pain     Call MD for:  difficulty breathing, headache or visual disturbances     Call MD for:  redness, tenderness, or signs of infection (pain, swelling, redness, odor or green/yellow discharge around incision site)     Call MD for:  hives     Call MD for:  persistent dizziness or light-headedness        Follow-up Information       Luana Hall NP Follow up.    Specialty: Family Medicine  Why: As needed  Contact information:  [id number] [address] [address]  [phone]

## 2025-05-21 VITALS
OXYGEN SATURATION: 98 % | HEIGHT: 67 IN | SYSTOLIC BLOOD PRESSURE: 122 MMHG | DIASTOLIC BLOOD PRESSURE: 69 MMHG | RESPIRATION RATE: 18 BRPM | TEMPERATURE: 98 F | WEIGHT: 198.31 LBS | BODY MASS INDEX: 31.12 KG/M2 | HEART RATE: 65 BPM

## 2025-06-02 DIAGNOSIS — I21.4 NSTEMI (NON-ST ELEVATED MYOCARDIAL INFARCTION): ICD-10-CM

## 2025-06-02 RX ORDER — ATORVASTATIN CALCIUM 80 MG/1
80 TABLET, FILM COATED ORAL
Qty: 90 TABLET | Refills: 3 | Status: SHIPPED | OUTPATIENT
Start: 2025-06-02

## 2025-06-05 ENCOUNTER — HOSPITAL ENCOUNTER (OUTPATIENT)
Dept: RADIOLOGY | Facility: HOSPITAL | Age: 45
Discharge: HOME | End: 2025-06-05
Attending: NURSE PRACTITIONER
Payer: MEDICARE

## 2025-06-05 ENCOUNTER — RESULTS FOLLOW-UP (OUTPATIENT)
Dept: INTERNAL MEDICINE | Facility: CLINIC | Age: 45
End: 2025-06-05

## 2025-06-05 ENCOUNTER — OFFICE VISIT (OUTPATIENT)
Dept: INTERNAL MEDICINE | Facility: CLINIC | Age: 45
End: 2025-06-05
Payer: MEDICARE

## 2025-06-05 VITALS
HEART RATE: 80 BPM | SYSTOLIC BLOOD PRESSURE: 130 MMHG | BODY MASS INDEX: 31.8 KG/M2 | DIASTOLIC BLOOD PRESSURE: 84 MMHG | OXYGEN SATURATION: 97 % | HEIGHT: 67 IN | WEIGHT: 202.63 LBS | TEMPERATURE: 97 F

## 2025-06-05 DIAGNOSIS — G89.29 CHRONIC LEFT HIP PAIN: ICD-10-CM

## 2025-06-05 DIAGNOSIS — G89.29 CHRONIC PAIN OF RIGHT KNEE: ICD-10-CM

## 2025-06-05 DIAGNOSIS — M25.552 CHRONIC LEFT HIP PAIN: Primary | ICD-10-CM

## 2025-06-05 DIAGNOSIS — E66.811 CLASS 1 OBESITY WITH SERIOUS COMORBIDITY AND BODY MASS INDEX (BMI) OF 31.0 TO 31.9 IN ADULT, UNSPECIFIED OBESITY TYPE: ICD-10-CM

## 2025-06-05 DIAGNOSIS — M25.561 CHRONIC PAIN OF RIGHT KNEE: ICD-10-CM

## 2025-06-05 DIAGNOSIS — I10 PRIMARY HYPERTENSION: Chronic | ICD-10-CM

## 2025-06-05 DIAGNOSIS — J30.89 NON-SEASONAL ALLERGIC RHINITIS, UNSPECIFIED TRIGGER: ICD-10-CM

## 2025-06-05 DIAGNOSIS — G89.29 CHRONIC LEFT HIP PAIN: Primary | ICD-10-CM

## 2025-06-05 DIAGNOSIS — I25.2 HISTORY OF NON-ST ELEVATION MYOCARDIAL INFARCTION (NSTEMI): ICD-10-CM

## 2025-06-05 DIAGNOSIS — M25.552 CHRONIC LEFT HIP PAIN: ICD-10-CM

## 2025-06-05 DIAGNOSIS — F41.1 GAD (GENERALIZED ANXIETY DISORDER): ICD-10-CM

## 2025-06-05 DIAGNOSIS — Z72.0 TOBACCO USE: Chronic | ICD-10-CM

## 2025-06-05 DIAGNOSIS — E78.5 HYPERLIPIDEMIA, UNSPECIFIED HYPERLIPIDEMIA TYPE: Chronic | ICD-10-CM

## 2025-06-05 PROBLEM — Z12.11 SPECIAL SCREENING FOR MALIGNANT NEOPLASMS, COLON: Status: RESOLVED | Noted: 2025-05-20 | Resolved: 2025-06-05

## 2025-06-05 PROBLEM — Z01.810 PREOP CARDIOVASCULAR EXAM: Status: RESOLVED | Noted: 2025-05-14 | Resolved: 2025-06-05

## 2025-06-05 PROCEDURE — 73502 X-RAY EXAM HIP UNI 2-3 VIEWS: CPT | Mod: TC,PO,LT

## 2025-06-05 PROCEDURE — 99999 PR PBB SHADOW E&M-EST. PATIENT-LVL IV: CPT | Mod: PBBFAC,,, | Performed by: NURSE PRACTITIONER

## 2025-06-05 PROCEDURE — 73502 X-RAY EXAM HIP UNI 2-3 VIEWS: CPT | Mod: 26,LT,, | Performed by: RADIOLOGY

## 2025-06-05 PROCEDURE — 73560 X-RAY EXAM OF KNEE 1 OR 2: CPT | Mod: TC,PO,LT

## 2025-06-05 RX ORDER — CLOPIDOGREL BISULFATE 75 MG/1
75 TABLET ORAL DAILY
Qty: 90 TABLET | Refills: 3 | Status: SHIPPED | OUTPATIENT
Start: 2025-06-05

## 2025-06-05 RX ORDER — FLUTICASONE PROPIONATE 50 MCG
2 SPRAY, SUSPENSION (ML) NASAL DAILY
Qty: 16 G | Refills: 0 | Status: SHIPPED | OUTPATIENT
Start: 2025-06-05

## 2025-06-05 RX ORDER — FENOFIBRATE 54 MG/1
54 TABLET ORAL DAILY
Qty: 90 TABLET | Refills: 3 | Status: SHIPPED | OUTPATIENT
Start: 2025-06-05

## 2025-06-05 RX ORDER — LEVOCETIRIZINE DIHYDROCHLORIDE 5 MG/1
5 TABLET, FILM COATED ORAL NIGHTLY PRN
Qty: 30 TABLET | Refills: 5 | Status: SHIPPED | OUTPATIENT
Start: 2025-06-05

## 2025-06-05 RX ORDER — DICLOFENAC SODIUM 10 MG/G
2 GEL TOPICAL 2 TIMES DAILY PRN
Qty: 200 G | Refills: 0 | Status: SHIPPED | OUTPATIENT
Start: 2025-06-05

## 2025-06-05 RX ORDER — ESCITALOPRAM OXALATE 20 MG/1
20 TABLET ORAL DAILY
Qty: 90 TABLET | Refills: 3 | Status: SHIPPED | OUTPATIENT
Start: 2025-06-05

## 2025-06-05 RX ORDER — TRAZODONE HYDROCHLORIDE 100 MG/1
100 TABLET ORAL NIGHTLY
Qty: 90 TABLET | Refills: 2 | Status: SHIPPED | OUTPATIENT
Start: 2025-06-05

## 2025-06-11 RX ORDER — NABUMENTONE 750 MG/1
750 TABLET ORAL 2 TIMES DAILY PRN
Qty: 30 TABLET | Refills: 0 | Status: SHIPPED | OUTPATIENT
Start: 2025-06-11

## 2025-06-11 NOTE — PROGRESS NOTES
"      Patient ID: Philipp Gonzales  YOB: 1980  MRN: [id number]    Referred By: Luana Hall NP     History of Present Illness: Philipp Gonzales is a 44 y.o. male who presents today with Pain of the Left Hip     History of Present Illness    HPI:  Philipp presents for evaluation of left hip pain ongoing for at least eight years. Pain is worse with pivoting, particularly during rotational movements. The onset may have coincided with the birth of his daughter. Recently, he has been experiencing right knee pain and lower back pain on the right side, which he attributes to compensating for the hip issue. The lower back pain has been more pronounced on the right side.    He does not take regular pain medication. Two days ago, pain was significantly worse, prompting him to contact his provider about pain management. He reports having a "good day" today.    He has a history of hip surgery at age [age] due to a complication from a sty in his eye, which led to a possible septic joint. He reports being in traction for an extended period.    He has a history of myocardial infarction and currently has two stents in place. He is taking Plavix. He denies any history of diabetes. Cortisone injection in the right knee for a cyst: Provided significant benefit. He has not needed another injection since.    MEDICATIONS:  - Plavix: He has been on it since a myocardial infarction and received two stents.    HISTORY:  - Hip surgery: Age 1, due to infection (septic joint)  - Two cardiac stents: Placed following a myocardial infarction  - Smoking: Current smoker  - Alcohol: Denies alcohol use         Past Medical History:   Past Medical History:   Diagnosis Date    Anxiety     Essential (primary) hypertension     High cholesterol     Hyperlipidemia     Insomnia     Legally blind     Macular degeneration     NSTEMI (non-ST elevated myocardial infarction) 04/03/2022     Past Surgical History:   Procedure Laterality Date    " CORONARY STENT PLACEMENT N/A 4/3/2022    Procedure: INSERTION, STENT, CORONARY ARTERY;  Surgeon: Eliud Cristina MD;  Location: [facility] CATH LAB;  Service: Cardiology;  Laterality: N/A;    HIP SURGERY      as child left    LEFT HEART CATHETERIZATION Left 4/3/2022    Procedure: CATHETERIZATION, HEART, LEFT;  Surgeon: Eliud Cristina MD;  Location: [facility] CATH LAB;  Service: Cardiology;  Laterality: Left;     Family History   Problem Relation Name Age of Onset    Macular degeneration Mother      Macular degeneration Maternal Grandfather      Hyperlipidemia Father      Diabetes Father      Heart attacks under age 50 Paternal Grandmother       Social History[1]  Medication List with Changes/Refills   Current Medications    ASPIRIN (ECOTRIN) 81 MG EC TABLET    Take 1 tablet (81 mg total) by mouth once daily.    ATORVASTATIN (LIPITOR) 80 MG TABLET    TAKE 1 TABLET BY MOUTH EVERY DAY    BETAMETHASONE VALERATE 0.1% (VALISONE) 0.1 % CREA    Apply topically 2 (two) times daily. for 14 days    CLOPIDOGREL (PLAVIX) 75 MG TABLET    Take 1 tablet (75 mg total) by mouth once daily.    DICLOFENAC SODIUM (VOLTAREN) 1 % GEL    Apply 2 g topically 2 (two) times daily as needed (joint pain).    ESCITALOPRAM OXALATE (LEXAPRO) 20 MG TABLET    Take 1 tablet (20 mg total) by mouth once daily.    FENOFIBRATE (TRICOR) 54 MG TABLET    Take 1 tablet (54 mg total) by mouth once daily.    FERROUS SULFATE 325 (65 FE) MG EC TABLET    Take 1 tablet (325 mg total) by mouth 2 (two) times daily.    FLUTICASONE PROPIONATE (FLONASE) 50 MCG/ACTUATION NASAL SPRAY    2 sprays (100 mcg total) by Each Nostril route once daily.    LEVOCETIRIZINE (XYZAL) 5 MG TABLET    Take 1 tablet (5 mg total) by mouth nightly as needed.    LISINOPRIL (PRINIVIL,ZESTRIL) 2.5 MG TABLET    TAKE ONE TABLET BY MOUTH EVERY DAY    METOPROLOL SUCCINATE (TOPROL-XL) 100 MG 24 HR TABLET    Take 1 tablet (100 mg total) by mouth once daily.    NABUMETONE (RELAFEN) 750 MG TABLET     Take 1 tablet (750 mg total) by mouth 2 (two) times daily as needed for Pain.    TRAZODONE (DESYREL) 100 MG TABLET    Take 1 tablet (100 mg total) by mouth every evening.     Review of patient's allergies indicates:  No Known Allergies    Physical Exam:   There is no height or weight on file to calculate BMI.    Detailed MSK exam:     Left Hip:  Inspection:  No swelling, erythema or ecchymosis.   Palpation tenderness: Nontender to palpation  Range of motion: 110 deg Flexion         30 deg IR         40 deg ER  Strength:  5/5 Extension    5/5 Flexion    5/5 Abduction    5/5 Adduction  Special Tests: Positive MARIBEL    Positive FADIR  Positive Log Roll  Positive Circumduction  Negative SLR  N/V Exam:  Tibial:    Normal sensory (plantar foot)  Normal motor (FHL)    Sup Peroneal:  Normal sensory (dorsal foot)  Normal motor (Peroneals)            Deep Peroneal:  Normal sensory (1st web space) Normal motor (EHL)    Sural:   Normal sensory (lateral foot)   Saphenous:   Normal sensory (medial lower leg)   Normal pedal pulses, warm and well perfused with capillary refill < 2 sec       Imaging:    X-Ray Hip 2 or 3 views Left with Pelvis when performed  Narrative: EXAM: XR HIP WITH PELVIS WHEN PERFORMED 2 OR 3 VIEWS LEFT    CLINICAL HISTORY: Left hip pain    TECHNIQUE: Left hip, 3 views    COMPARISON:  No studies are available for comparison.    FINDINGS: Patchy sclerosis and flattening of the left femoral head with moderate medial left degenerative joint disease.  Marginal osteophyte formation.  Negative for fracture.  Alignment is satisfactory.  Impression:  Left hip degenerative joint disease with flattening of the left femoral head suggesting dysplasia and possibly osteonecrosis.  Recommend MRI correlation.    Finalized on: 6/5/2025 2:26 PM By:  BROOKLYN Stanton MD, MD  [facility]# [id number]      2025-06-05 14:28:10.078     [facility]    Relevant imaging results were reviewed and interpreted by me and per my read:  Moderate to severe  degenerative changes about the left hip joint.  There is patchy sclerosis through the femoral head, and areas of flattening of the femoral head, concerning for possible avascular necrosis.  Abnormal morphology of bilateral femoral heads, consistent with hip dysplasia.    This was discussed with the patient and / or family today.     Patient Instructions   Assessment:  Philipp Gonzales is a 44 y.o. male with a chief complaint of Pain of the Left Hip    Encounter Diagnoses   Name Primary?    Avascular necrosis of bone of left hip Yes    Osteoarthritis resulting from left hip dysplasia     Chronic pain of right knee     Pain of left hip     Bilateral hip dysplasia     Chronic left hip pain       Plan:  X-rays reviewed - moderate to severe degenerative changes about the right hip joint, with joint space narrowing, osteophytosis, sclerosis.  There is also sclerotic changes of the femoral head, concerning for possible avascular necrosis.  Abnormal morphology of bilateral femoral heads, consistent with hip dysplasia.    Patient is having chronic, atraumatic left hip pain.  He did have a septic joint as a child, requiring surgery.  He is unsure if use born breech, or diagnosed with a hip dysplasia as a child.  He does smoke cigarettes, but does not drink alcohol.  Given the radiographic findings, and chronic left hip pain, I am concerned about the hip joint, primarily for avascular necrosis.  Therefore recommend MRI without contrast of the left hip to evaluate further.  Follow up after MRI, discuss results, determine the next best course of treatment, whether that be conservative/nonoperative, or if surgical intervention is warranted either for joint sparing core decompression or even possible need for hip replacement.  Agree with PCP, start nabumetone 750 mg twice daily.    Follow-up:  After MRI or sooner if there are any problems between now and then.    Thank you for choosing [facility] and   Tano Degroot for your orthopedic & sports medicine care. It is our goal to provide you with exceptional care that will help keep you healthy, active, and get you back in the game.    Please do not hesitate to reach out to us via email, phone, or MyChart with any questions, concerns, or feedback.    If you are experiencing pain/discomfort, or have questions after 5pm and would like to be connected to the [facility] [place] on-call team, please call this number and specify which Sports Medicine provider is treating you: [phone]  During business hours, before 5 PM, if you have any questions or concerns, please call this number and as to speak with a member of Dr Degroot's team: [phone]      A copy of today's visit note has been sent to the referring provider.           Tano Degroot MD  Primary Care Sports Medicine    Disclaimer: This note was prepared using a voice recognition system and is likely to have sound alike errors within the text.     This note was generated with the assistance of ambient listening technology. Verbal consent was obtained by the patient and accompanying visitor(s) for the recording of patient appointment to facilitate this note. I attest to having reviewed and edited the generated note for accuracy, though some syntax or spelling errors may persist. Please contact the author of this note for any clarification.         [1]   Social History  Socioeconomic History    Marital status: Single   Tobacco Use    Smoking status: Every Day     Current packs/day: 1.00     Types: Cigarettes    Smokeless tobacco: Never   Vaping Use    Vaping status: Never Used   Substance and Sexual Activity    Alcohol use: Yes     Comment: rarely    Drug use: No    Sexual activity: Yes     Partners: Female     Social Drivers of Health     Financial Resource Strain: Low Risk  (6/5/2025)    Overall Financial Resource Strain ([facility])     Difficulty of Paying Living Expenses: Not hard  at all   Food Insecurity: Food Insecurity Present (6/5/2025)    Hunger Vital Sign     Worried About Running Out of Food in the Last Year: Sometimes true     Ran Out of Food in the Last Year: Sometimes true   Transportation Needs: No Transportation Needs (6/5/2025)    PRAPARE - Transportation     Lack of Transportation (Medical): No     Lack of Transportation (Non-Medical): No   Physical Activity: Insufficiently Active (6/5/2025)    Exercise Vital Sign     Days of Exercise per Week: 2 days     Minutes of Exercise per Session: 10 min   Stress: No Stress Concern Present (6/5/2025)    [employer] [place] [employer] - Occupational Stress Questionnaire     Feeling of Stress : Not at all   Housing Stability: High Risk (6/5/2025)    Housing Stability Vital Sign     Unable to Pay for Housing in the Last Year: Yes     Number of Times Moved in the Last Year: 0     Homeless in the Last Year: No

## 2025-06-12 ENCOUNTER — OFFICE VISIT (OUTPATIENT)
Dept: SPORTS MEDICINE | Facility: CLINIC | Age: 45
End: 2025-06-12
Payer: MEDICARE

## 2025-06-12 DIAGNOSIS — M25.552 CHRONIC LEFT HIP PAIN: ICD-10-CM

## 2025-06-12 DIAGNOSIS — M25.561 CHRONIC PAIN OF RIGHT KNEE: ICD-10-CM

## 2025-06-12 DIAGNOSIS — G89.29 CHRONIC PAIN OF RIGHT KNEE: ICD-10-CM

## 2025-06-12 DIAGNOSIS — G89.29 CHRONIC LEFT HIP PAIN: ICD-10-CM

## 2025-06-12 DIAGNOSIS — M16.32 OSTEOARTHRITIS RESULTING FROM LEFT HIP DYSPLASIA: ICD-10-CM

## 2025-06-12 DIAGNOSIS — M87.052 AVASCULAR NECROSIS OF BONE OF LEFT HIP: Primary | ICD-10-CM

## 2025-06-12 DIAGNOSIS — Q65.89 BILATERAL HIP DYSPLASIA: ICD-10-CM

## 2025-06-12 DIAGNOSIS — M25.552 PAIN OF LEFT HIP: ICD-10-CM

## 2025-06-12 PROCEDURE — 1159F MED LIST DOCD IN RCRD: CPT | Mod: CPTII,S$GLB,, | Performed by: STUDENT IN AN ORGANIZED HEALTH CARE EDUCATION/TRAINING PROGRAM

## 2025-06-12 PROCEDURE — 99999 PR PBB SHADOW E&M-EST. PATIENT-LVL III: CPT | Mod: PBBFAC,,, | Performed by: STUDENT IN AN ORGANIZED HEALTH CARE EDUCATION/TRAINING PROGRAM

## 2025-06-12 PROCEDURE — 4010F ACE/ARB THERAPY RXD/TAKEN: CPT | Mod: CPTII,S$GLB,, | Performed by: STUDENT IN AN ORGANIZED HEALTH CARE EDUCATION/TRAINING PROGRAM

## 2025-06-12 PROCEDURE — 1160F RVW MEDS BY RX/DR IN RCRD: CPT | Mod: CPTII,S$GLB,, | Performed by: STUDENT IN AN ORGANIZED HEALTH CARE EDUCATION/TRAINING PROGRAM

## 2025-06-12 PROCEDURE — 99204 OFFICE O/P NEW MOD 45 MIN: CPT | Mod: S$GLB,,, | Performed by: STUDENT IN AN ORGANIZED HEALTH CARE EDUCATION/TRAINING PROGRAM

## 2025-06-12 NOTE — PATIENT INSTRUCTIONS
Assessment:  Philipp Gonzales is a 44 y.o. male with a chief complaint of Pain of the Left Hip    Encounter Diagnoses   Name Primary?    Avascular necrosis of bone of left hip Yes    Osteoarthritis resulting from left hip dysplasia     Chronic pain of right knee     Pain of left hip     Bilateral hip dysplasia     Chronic left hip pain       Plan:  X-rays reviewed - moderate to severe degenerative changes about the right hip joint, with joint space narrowing, osteophytosis, sclerosis.  There is also sclerotic changes of the femoral head, concerning for possible avascular necrosis.  Abnormal morphology of bilateral femoral heads, consistent with hip dysplasia.    Patient is having chronic, atraumatic left hip pain.  He did have a septic joint as a child, requiring surgery.  He is unsure if use born breech, or diagnosed with a hip dysplasia as a child.  He does smoke cigarettes, but does not drink alcohol.  Given the radiographic findings, and chronic left hip pain, I am concerned about the hip joint, primarily for avascular necrosis.  Therefore recommend MRI without contrast of the left hip to evaluate further.  Follow up after MRI, discuss results, determine the next best course of treatment, whether that be conservative/nonoperative, or if surgical intervention is warranted either for joint sparing core decompression or even possible need for hip replacement.  Agree with PCP, start nabumetone 750 mg twice daily.    Follow-up:  After MRI or sooner if there are any problems between now and then.    Thank you for choosing [facility] and Dr. Tano Degroot for your orthopedic & sports medicine care. It is our goal to provide you with exceptional care that will help keep you healthy, active, and get you back in the game.    Please do not hesitate to reach out to us via email, phone, or [employer] with any questions, concerns, or feedback.    If you are experiencing pain/discomfort, or have questions  after 5pm and would like to be connected to the [facility] [place] on-call team, please call this number and specify which Sports Medicine provider is treating you: [phone]  During business hours, before 5 PM, if you have any questions or concerns, please call this number and as to speak with a member of Dr Degroot's team: [phone]

## 2025-06-19 ENCOUNTER — HOSPITAL ENCOUNTER (OUTPATIENT)
Dept: RADIOLOGY | Facility: HOSPITAL | Age: 45
Discharge: HOME | End: 2025-06-19
Attending: STUDENT IN AN ORGANIZED HEALTH CARE EDUCATION/TRAINING PROGRAM
Payer: MEDICARE

## 2025-06-19 DIAGNOSIS — M25.552 PAIN OF LEFT HIP: ICD-10-CM

## 2025-06-19 PROCEDURE — 73721 MRI JNT OF LWR EXTRE W/O DYE: CPT | Mod: TC,PO,LT

## 2025-06-19 PROCEDURE — 73721 MRI JNT OF LWR EXTRE W/O DYE: CPT | Mod: 26,LT,, | Performed by: RADIOLOGY

## 2025-06-20 ENCOUNTER — OFFICE VISIT (OUTPATIENT)
Dept: SPORTS MEDICINE | Facility: CLINIC | Age: 45
End: 2025-06-20
Payer: MEDICARE

## 2025-06-20 ENCOUNTER — PATIENT MESSAGE (OUTPATIENT)
Dept: SPORTS MEDICINE | Facility: CLINIC | Age: 45
End: 2025-06-20

## 2025-06-20 DIAGNOSIS — M25.552 PAIN OF LEFT HIP: ICD-10-CM

## 2025-06-20 DIAGNOSIS — M84.452A SUBCHONDRAL INSUFFICIENCY FRACTURE OF CONDYLE OF LEFT FEMUR, INITIAL ENCOUNTER: Primary | ICD-10-CM

## 2025-06-20 DIAGNOSIS — M16.32 OSTEOARTHRITIS RESULTING FROM LEFT HIP DYSPLASIA: ICD-10-CM

## 2025-06-20 NOTE — PROGRESS NOTES
Patient ID: Philipp Gonzales  YOB: 1980  MRN: [id number]    Referred By: Self     History of Present Illness: Philipp Gonzales is a 45 y.o. male who presents today with left hip pain     History of Present Illness    Patient is following up today for MRI review for his chronic left hip pain.  No changes in his current status.  Still quite significant, limiting hip pain.    Previous HPI:  He was initially evaluated in our office on 6/12/25 with chronic left hip pain.  He was diagnosed with left hip dysplasia with likely avascular necrosis and referred for an MRI of the left hip to evaluate further.  He was prescribed nabumetone 750mg NOEMI by his PCP.      Past Medical History:   Past Medical History:   Diagnosis Date    Anxiety     Essential (primary) hypertension     High cholesterol     Hyperlipidemia     Insomnia     Legally blind     Macular degeneration     NSTEMI (non-ST elevated myocardial infarction) 04/03/2022     Past Surgical History:   Procedure Laterality Date    CORONARY STENT PLACEMENT N/A 4/3/2022    Procedure: INSERTION, STENT, CORONARY ARTERY;  Surgeon: Eliud Cristina MD;  Location: [facility] CATH LAB;  Service: Cardiology;  Laterality: N/A;    HIP SURGERY      as child left    LEFT HEART CATHETERIZATION Left 4/3/2022    Procedure: CATHETERIZATION, HEART, LEFT;  Surgeon: Eliud Cristina MD;  Location: [facility] CATH LAB;  Service: Cardiology;  Laterality: Left;     Family History   Problem Relation Name Age of Onset    Macular degeneration Mother      Macular degeneration Maternal Grandfather      Hyperlipidemia Father      Diabetes Father      Heart attacks under age 50 Paternal Grandmother       Social History[1]  Medication List with Changes/Refills   Current Medications    ASPIRIN (ECOTRIN) 81 MG EC TABLET    Take 1 tablet (81 mg total) by mouth once daily.    ATORVASTATIN (LIPITOR) 80 MG TABLET    TAKE 1 TABLET BY MOUTH EVERY DAY    BETAMETHASONE VALERATE 0.1% (VALISONE) 0.1 %  CREA    Apply topically 2 (two) times daily. for 14 days    CLOPIDOGREL (PLAVIX) 75 MG TABLET    Take 1 tablet (75 mg total) by mouth once daily.    DICLOFENAC SODIUM (VOLTAREN) 1 % GEL    Apply 2 g topically 2 (two) times daily as needed (joint pain).    ESCITALOPRAM OXALATE (LEXAPRO) 20 MG TABLET    Take 1 tablet (20 mg total) by mouth once daily.    FENOFIBRATE (TRICOR) 54 MG TABLET    Take 1 tablet (54 mg total) by mouth once daily.    FERROUS SULFATE 325 (65 FE) MG EC TABLET    Take 1 tablet (325 mg total) by mouth 2 (two) times daily.    FLUTICASONE PROPIONATE (FLONASE) 50 MCG/ACTUATION NASAL SPRAY    2 sprays (100 mcg total) by Each Nostril route once daily.    LEVOCETIRIZINE (XYZAL) 5 MG TABLET    Take 1 tablet (5 mg total) by mouth nightly as needed.    LISINOPRIL (PRINIVIL,ZESTRIL) 2.5 MG TABLET    TAKE ONE TABLET BY MOUTH EVERY DAY    METOPROLOL SUCCINATE (TOPROL-XL) 100 MG 24 HR TABLET    Take 1 tablet (100 mg total) by mouth once daily.    NABUMETONE (RELAFEN) 750 MG TABLET    Take 1 tablet (750 mg total) by mouth 2 (two) times daily as needed for Pain.    TRAZODONE (DESYREL) 100 MG TABLET    Take 1 tablet (100 mg total) by mouth every evening.     Review of patient's allergies indicates:  No Known Allergies    Physical Exam:   There is no height or weight on file to calculate BMI.  Physical Exam  Vitals reviewed.   Constitutional:       Appearance: Normal appearance.   HENT:      Head: Normocephalic and atraumatic.      Nose: Nose normal.   Eyes:      Extraocular Movements: Extraocular movements intact.      Conjunctiva/sclera: Conjunctivae normal.   Pulmonary:      Effort: Pulmonary effort is normal.   Neurological:      General: No focal deficit present.      Mental Status: He is alert and oriented to person, place, and time.   Psychiatric:         Mood and Affect: Mood normal.         Prior MSK exam:     Left Hip:  Inspection:  No swelling, erythema or ecchymosis.   Palpation tenderness: Nontender  to palpation  Range of motion: 110 deg Flexion         30 deg IR         40 deg ER  Strength:  5/5 Extension    5/5 Flexion    5/5 Abduction    5/5 Adduction  Special Tests: Positive MARIBEL    Positive FADIR  Positive Log Roll  Positive Circumduction  Negative SLR  N/V Exam:  Tibial:    Normal sensory (plantar foot)  Normal motor (FHL)    Sup Peroneal:  Normal sensory (dorsal foot)  Normal motor (Peroneals)            Deep Peroneal:  Normal sensory (1st web space) Normal motor (EHL)    Sural:   Normal sensory (lateral foot)   Saphenous:   Normal sensory (medial lower leg)   Normal pedal pulses, warm and well perfused with capillary refill < 2 sec       Imaging:    MRI Hip Without Contrast Left  Narrative: EXAM: MRI HIP WITHOUT CONTRAST LEFT    CLINICAL HISTORY: Left hip pain.    COMPARISON:  Left hip x-ray on 06/05/2025    TECHNIQUE:  Multiplanar, multisequence MR imaging was performed through the left hip without intravenous or intra-articular gadolinium contrast.    FINDINGS:    The left hip joint space is mildly narrowed with diffuse full-thickness chondral loss and patchy subchondral edema on both sides of the joint.  Subchondral cysts at the medial posterior acetabulum.  There is linear hyperintense signal through the left femoral head through the left femoral head.  Mild flattening of the anterior superior femoral articular surface.  No evidence of osteonecrosis.    Left acetabular labral tear.  Small left hip joint effusion.  The visualized ligaments and tendons are intact.  Impression: Nondisplaced left femoral head fracture, likely an insufficiency fracture.  No evidence of osteonecrosis.    Left hip degenerative joint disease.  Small left hip joint effusion.    Left acetabular labral tear.    Finalized on: 6/19/2025 11:55 AM By:  BROOKLYN Stanton MD, MD  [facility]# [id number]      2025-06-19 11:57:08.037     [facility]     Relevant imaging results were reviewed and interpreted by me and per my read:  Severe degenerative  changes about the left hip joint.  Patchy edema within the femoral head, concerning for avascular necrosis.  Likely insufficiency fracture of the femoral head.    This was discussed with the patient and / or family today.     Patient Instructions   Assessment:  Philipp Gonzales is a 45 y.o. male with a chief complaint of No chief complaint on file.    Encounter Diagnoses   Name Primary?    Subchondral insufficiency fracture of condyle of left femur, initial encounter Yes    Osteoarthritis resulting from left hip dysplasia     Pain of left hip       Plan:  MRI reviewed - severe degenerative changes about the left hip joint, likely insufficiency fracture of the femoral head.  We will radiology says this is not avascular necrosis, still not certain, given the edematous appearance of the femoral head.  This may just be due to severe osteoarthritis.    Regardless, recommend surgical evaluation.    We will refer to Dr. Alejandro Gonzalez at [facility] for eval.  In the interim, given possible insufficiency fracture, would recommend offloading the hip, using crutches as often as you are able.    Follow-up:  7/10 with Dr. Alejandro Gonzalez at [facility] or sooner if there are any problems between now and then.    Thank you for choosing [facility] and Dr. Tano Degroot for your orthopedic & sports medicine care. It is our goal to provide you with exceptional care that will help keep you healthy, active, and get you back in the game.    Please do not hesitate to reach out to us via email, phone, or MyChart with any questions, concerns, or feedback.    If you are experiencing pain/discomfort, or have questions after 5pm and would like to be connected to the [facility] on-call team, please call this number and specify which Sports Medicine provider is treating you: [phone]  During business hours, before 5 PM, if you have any questions or concerns, please call this  number and as to speak with a member of Dr Degroot's team: [phone]      A copy of today's visit note has been sent to the referring provider.           Tano Degroot MD  Primary Care Sports Medicine    Disclaimer: This note was prepared using a voice recognition system and is likely to have sound alike errors within the text.     This note was generated with the assistance of ambient listening technology. Verbal consent was obtained by the patient and accompanying visitor(s) for the recording of patient appointment to facilitate this note. I attest to having reviewed and edited the generated note for accuracy, though some syntax or spelling errors may persist. Please contact the author of this note for any clarification.      The patient location is: [place]  The chief complaint leading to consultation is:  Left hip pain    Visit type: audiovisual    Face to Face time with patient: 15  30 minutes of total time spent on the encounter, which includes face to face time and non-face to face time preparing to see the patient (eg, review of tests), Obtaining and/or reviewing separately obtained history, Documenting clinical information in the electronic or other health record, Independently interpreting results (not separately reported) and communicating results to the patient/family/caregiver, or Care coordination (not separately reported).                              [1]   Social History  Socioeconomic History    Marital status: Single   Tobacco Use    Smoking status: Every Day     Current packs/day: 1.00     Types: Cigarettes    Smokeless tobacco: Never   Vaping Use    Vaping status: Never Used   Substance and Sexual Activity    Alcohol use: Yes     Comment: rarely    Drug use: No    Sexual activity: Yes     Partners: Female     Social Drivers of Health     Financial Resource Strain: Low Risk  (6/5/2025)    Overall Financial Resource Strain (CARDIA)     Difficulty of Paying Living Expenses: Not hard at all    Food Insecurity: Food Insecurity Present (6/5/2025)    Hunger Vital Sign     Worried About Running Out of Food in the Last Year: Sometimes true     Ran Out of Food in the Last Year: Sometimes true   Transportation Needs: No Transportation Needs (6/5/2025)    PRAPARE - Transportation     Lack of Transportation (Medical): No     Lack of Transportation (Non-Medical): No   Physical Activity: Insufficiently Active (6/5/2025)    Exercise Vital Sign     Days of Exercise per Week: 2 days     Minutes of Exercise per Session: 10 min   Stress: No Stress Concern Present (6/5/2025)    [employer] [place] - Occupational Stress Questionnaire     Feeling of Stress : Not at all   Housing Stability: High Risk (6/5/2025)    Housing Stability Vital Sign     Unable to Pay for Housing in the Last Year: Yes     Number of Times Moved in the Last Year: 0     Homeless in the Last Year: No

## 2025-06-20 NOTE — PATIENT INSTRUCTIONS
Assessment:  Philipp Gonzales is a 45 y.o. male with a chief complaint of No chief complaint on file.    Encounter Diagnoses   Name Primary?    Subchondral insufficiency fracture of condyle of left femur, initial encounter Yes    Osteoarthritis resulting from left hip dysplasia     Pain of left hip       Plan:  MRI reviewed - severe degenerative changes about the left hip joint, likely insufficiency fracture of the femoral head.  We will radiology says this is not avascular necrosis, still not certain, given the edematous appearance of the femoral head.  This may just be due to severe osteoarthritis.    Regardless, recommend surgical evaluation.    We will refer to Dr. Alejandro Gonzalez at [facility] for eval.  In the interim, given possible insufficiency fracture, would recommend offloading the hip, using crutches as often as you are able.    Follow-up:  7/10 with Dr. Alejandro Gonzalez at [facility] or sooner if there are any problems between now and then.    Thank you for choosing [facility] and Dr. Tano Degroot for your orthopedic & sports medicine care. It is our goal to provide you with exceptional care that will help keep you healthy, active, and get you back in the game.    Please do not hesitate to reach out to us via email, phone, or MyChart with any questions, concerns, or feedback.    If you are experiencing pain/discomfort, or have questions after 5pm and would like to be connected to the [facility] on-call team, please call this number and specify which Sports Medicine provider is treating you: [phone]  During business hours, before 5 PM, if you have any questions or concerns, please call this number and as to speak with a member of Dr Degroot's team: [phone]

## 2025-06-24 ENCOUNTER — PATIENT MESSAGE (OUTPATIENT)
Dept: SPORTS MEDICINE | Facility: CLINIC | Age: 45
End: 2025-06-24
Payer: MEDICARE

## 2025-06-27 DIAGNOSIS — M84.452A SUBCHONDRAL INSUFFICIENCY FRACTURE OF CONDYLE OF LEFT FEMUR, INITIAL ENCOUNTER: Primary | ICD-10-CM

## 2025-06-27 DIAGNOSIS — M87.052 AVASCULAR NECROSIS OF BONE OF LEFT HIP: ICD-10-CM

## 2025-06-27 DIAGNOSIS — M25.552 PAIN OF LEFT HIP: ICD-10-CM

## 2025-06-27 DIAGNOSIS — M16.32 OSTEOARTHRITIS RESULTING FROM LEFT HIP DYSPLASIA: ICD-10-CM

## 2025-06-27 RX ORDER — TRAMADOL HYDROCHLORIDE 50 MG/1
TABLET, FILM COATED ORAL
Qty: 30 TABLET | Refills: 0 | Status: SHIPPED | OUTPATIENT
Start: 2025-06-27

## 2025-07-07 NOTE — PROGRESS NOTES
Patient ID: Philipp Gonzales  YOB: 1980  MRN: [id number]    Chief Complaint: No chief complaint on file.      Referred By: Tano Degroot MD     History of Present Illness: Philipp Gonzales is a  45 y.o. male   Data Unavailable with a chief complaint of No chief complaint on file.    Presents today with left hip pain. Referral from Dr. Tano Degroot. Rates pain at a 6-7/10. Pain has been present for about 5 years.  Has tried OTC pain medication and Relafen.  He reports having some childhood problem with the left hip but he can not remember specifically what it was.  He denies any recent injury or trauma      HPI    Past Medical History:   Past Medical History:   Diagnosis Date    Anxiety     Essential (primary) hypertension     High cholesterol     Hyperlipidemia     Insomnia     Legally blind     Macular degeneration     NSTEMI (non-ST elevated myocardial infarction) 04/03/2022     Past Surgical History:   Procedure Laterality Date    CORONARY STENT PLACEMENT N/A 4/3/2022    Procedure: INSERTION, STENT, CORONARY ARTERY;  Surgeon: Eliud Cristina MD;  Location: [facility] CATH LAB;  Service: Cardiology;  Laterality: N/A;    HIP SURGERY      as child left    LEFT HEART CATHETERIZATION Left 4/3/2022    Procedure: CATHETERIZATION, HEART, LEFT;  Surgeon: Eliud Cristina MD;  Location: [facility] CATH LAB;  Service: Cardiology;  Laterality: Left;     Family History   Problem Relation Name Age of Onset    Macular degeneration Mother      Macular degeneration Maternal Grandfather      Hyperlipidemia Father      Diabetes Father      Heart attacks under age 50 Paternal Grandmother       Social History[1]  Medication List with Changes/Refills   Current Medications    ASPIRIN (ECOTRIN) 81 MG EC TABLET    Take 1 tablet (81 mg total) by mouth once daily.    ATORVASTATIN (LIPITOR) 80 MG TABLET    TAKE 1 TABLET BY MOUTH EVERY DAY    BETAMETHASONE VALERATE 0.1% (VALISONE) 0.1 % CREA    Apply topically 2 (two) times  daily. for 14 days    CLOPIDOGREL (PLAVIX) 75 MG TABLET    Take 1 tablet (75 mg total) by mouth once daily.    DICLOFENAC SODIUM (VOLTAREN) 1 % GEL    Apply 2 g topically 2 (two) times daily as needed (joint pain).    ESCITALOPRAM OXALATE (LEXAPRO) 20 MG TABLET    Take 1 tablet (20 mg total) by mouth once daily.    FENOFIBRATE (TRICOR) 54 MG TABLET    Take 1 tablet (54 mg total) by mouth once daily.    FERROUS SULFATE 325 (65 FE) MG EC TABLET    Take 1 tablet (325 mg total) by mouth 2 (two) times daily.    FLUTICASONE PROPIONATE (FLONASE) 50 MCG/ACTUATION NASAL SPRAY    2 sprays (100 mcg total) by Each Nostril route once daily.    LEVOCETIRIZINE (XYZAL) 5 MG TABLET    Take 1 tablet (5 mg total) by mouth nightly as needed.    LISINOPRIL (PRINIVIL,ZESTRIL) 2.5 MG TABLET    TAKE ONE TABLET BY MOUTH EVERY DAY    METOPROLOL SUCCINATE (TOPROL-XL) 100 MG 24 HR TABLET    Take 1 tablet (100 mg total) by mouth once daily.    NABUMETONE (RELAFEN) 750 MG TABLET    Take 1 tablet (750 mg total) by mouth 2 (two) times daily as needed for Pain.    TRAMADOL (ULTRAM) 50 MG TABLET    Take 1 tablet (50 mg), by mouth, every 8 hours, as needed for pain    TRAZODONE (DESYREL) 100 MG TABLET    Take 1 tablet (100 mg total) by mouth every evening.     Review of patient's allergies indicates:  No Known Allergies  Review of Systems   Constitutional: Negative for chills and decreased appetite.   HENT:  Negative for ear pain.    Eyes:  Negative for blurred vision.   Cardiovascular:  Negative for chest pain and claudication.   Respiratory:  Negative for hemoptysis.    Endocrine: Negative for cold intolerance.   Hematologic/Lymphatic: Does not bruise/bleed easily.   Skin:  Negative for dry skin.   Musculoskeletal:  Positive for joint pain.   Gastrointestinal:  Negative for abdominal pain.   Genitourinary:  Negative for flank pain.   Neurological:  Negative for brief paralysis.   Psychiatric/Behavioral:  Negative for depression.     Allergic/Immunologic: Negative for hives.       Physical Exam:   There is no height or weight on file to calculate BMI.  There were no vitals filed for this visit.   GENERAL: Well appearing, appropriate for stated age, no acute distress.  CARDIOVASCULAR: Pulses regular by peripheral palpation.  PULMONARY: Respirations are even and non-labored.  NEURO: Awake, alert, and oriented x 3.  PSYCH: Mood & affect are appropriate.  HEENT: Head is normocephalic and atraumatic.  Ortho/SPM Exam  Skin is intact about the hip no signs of erythema or infection  Range of motion of the left hip is severely limited with only 10° of internal rotation and this is quite painful  Straight leg raise is negative neurovascular exam of the extremities intact  He has no knee effusion in a normal knee exam    Imaging:    MRI Hip Without Contrast Left  Narrative: EXAM: MRI HIP WITHOUT CONTRAST LEFT    CLINICAL HISTORY: Left hip pain.    COMPARISON:  Left hip x-ray on 06/05/2025    TECHNIQUE:  Multiplanar, multisequence MR imaging was performed through the left hip without intravenous or intra-articular gadolinium contrast.    FINDINGS:    The left hip joint space is mildly narrowed with diffuse full-thickness chondral loss and patchy subchondral edema on both sides of the joint.  Subchondral cysts at the medial posterior acetabulum.  There is linear hyperintense signal through the left femoral head through the left femoral head.  Mild flattening of the anterior superior femoral articular surface.  No evidence of osteonecrosis.    Left acetabular labral tear.  Small left hip joint effusion.  The visualized ligaments and tendons are intact.  Impression: Nondisplaced left femoral head fracture, likely an insufficiency fracture.  No evidence of osteonecrosis.    Left hip degenerative joint disease.  Small left hip joint effusion.    Left acetabular labral tear.    Finalized on: 6/19/2025 11:55 AM By:  BROOKLYN Stanton MD, MD  [facility]# [id number]       2025-06-19 11:57:[id number]     [facility]      Relevant imaging results reviewed and interpreted by me, discussed with the patient and / or family today.  X-rays show severe degenerative changes in the hip with a quite abnormal femoral head shape    Other Tests:     MRI was interpreted as a femoral head fracture.  Reviewing the MRI and the x-rays I believe this is just osteophyte formation.    There are no Patient Instructions on file for this visit.  Provider Note/Medical Decision Making:     Assessment: Philipp Gonzales is a 45 y.o. male presenting with left hip pain.  History, physical and radiographs are consistent with a likely diagnosis of severe left hip osteoarthritis.  Plan:  He needs hip replacement.  I answered all of his and his wife's questions at today at length.  We went over the risks and benefits of hip replacement.  Risks include infection, persistent pain, injury to blood vessels or nerves, anesthesia problems, need for further surgery is being some but not all the risks.  He understood this well and consented to proceed.   Follow up:  2 weeks postop. All questions answered    I discussed worrisome and red flag signs and symptoms with the patient. The patient expressed understanding and agreed to alert me immediately or to go to the emergency room if they experience any of these.   Treatment plan was developed with input from the patient/family, and they expressed understanding and agreement with the plan. All questions were answered today.    The mobility limitation cannot be sufficiently resolved by the use of a cane.   Patient's functional mobility deficit can be sufficiently resolved with the use of a Rolling Walker. Patient's mobility limitation significantly impairs their ability to participate in one of more activities of daily living.  The use of a RW will significantly improve the patient's ability to participate in MRADLS and the patient will use it on regular basis in the home..  Patient's  mobility limitation significantly impairs their ability to participate in one of more activities of daily living.  The use of a Rolling Walker. Patient's mobility limitation significantly impairs their ability to participate in one of more activities of daily living.  The use of a RW will significantly improve the patient's ability to participate in MRADLS and the patient will use it on regular basis in the home. will significantly improve the patient's ability to participate in MRADLS and the patient will use it on regular basis in the home.           Te Gonzalez MD  Orthopaedic Surgery       Disclaimer: This note was prepared using a voice recognition system and is likely to have sound alike errors within the text.          [1]   Social History  Socioeconomic History    Marital status: Single   Tobacco Use    Smoking status: Every Day     Current packs/day: 1.00     Types: Cigarettes    Smokeless tobacco: Never   Vaping Use    Vaping status: Never Used   Substance and Sexual Activity    Alcohol use: Yes     Comment: rarely    Drug use: No    Sexual activity: Yes     Partners: Female     Social Drivers of Health     Financial Resource Strain: Low Risk  (6/5/2025)    Overall Financial Resource Strain (CARDIA)     Difficulty of Paying Living Expenses: Not hard at all   Food Insecurity: Food Insecurity Present (6/5/2025)    Hunger Vital Sign     Worried About Running Out of Food in the Last Year: Sometimes true     Ran Out of Food in the Last Year: Sometimes true   Transportation Needs: No Transportation Needs (6/5/2025)    PRAPARE - Transportation     Lack of Transportation (Medical): No     Lack of Transportation (Non-Medical): No   Physical Activity: Insufficiently Active (6/5/2025)    Exercise Vital Sign     Days of Exercise per Week: 2 days     Minutes of Exercise per Session: 10 min   Stress: No Stress Concern Present (6/5/2025)    [employer] - Occupational Stress  Questionnaire     Feeling of Stress : Not at all   Housing Stability: High Risk (6/5/2025)    Housing Stability Vital Sign     Unable to Pay for Housing in the Last Year: Yes     Number of Times Moved in the Last Year: 0     Homeless in the Last Year: No

## 2025-07-10 ENCOUNTER — OFFICE VISIT (OUTPATIENT)
Dept: ORTHOPEDICS | Facility: CLINIC | Age: 45
End: 2025-07-10
Payer: MEDICARE

## 2025-07-10 VITALS
HEART RATE: 59 BPM | BODY MASS INDEX: 31.8 KG/M2 | HEIGHT: 67 IN | SYSTOLIC BLOOD PRESSURE: 113 MMHG | WEIGHT: 202.63 LBS | DIASTOLIC BLOOD PRESSURE: 69 MMHG

## 2025-07-10 DIAGNOSIS — M25.552 PAIN OF LEFT HIP: Primary | ICD-10-CM

## 2025-07-10 DIAGNOSIS — M25.552 PAIN OF LEFT HIP: ICD-10-CM

## 2025-07-10 DIAGNOSIS — M84.452A SUBCHONDRAL INSUFFICIENCY FRACTURE OF CONDYLE OF LEFT FEMUR, INITIAL ENCOUNTER: ICD-10-CM

## 2025-07-10 DIAGNOSIS — M16.32 OSTEOARTHRITIS RESULTING FROM LEFT HIP DYSPLASIA: ICD-10-CM

## 2025-07-10 DIAGNOSIS — Z01.818 PRE-OP TESTING: Primary | ICD-10-CM

## 2025-07-10 PROCEDURE — 99999 PR PBB SHADOW E&M-EST. PATIENT-LVL V: CPT | Mod: PBBFAC,,, | Performed by: ORTHOPAEDIC SURGERY

## 2025-07-10 NOTE — PATIENT INSTRUCTIONS
Assessment:  Philipp Gonzales is a 45 y.o. male   Chief Complaint   Patient presents with    Left Hip - Pain       Encounter Diagnoses   Name Primary?    Subchondral insufficiency fracture of condyle of left femur, initial encounter     Osteoarthritis resulting from left hip dysplasia     Pain of left hip         Plan:  Reviewed imaging with patient.  Discussed conservative vs surgical treatment.  Elected to proceed with surgery.  Placed orders for walker  Surgery scheduled    Follow-up: pre op testing and boot camp or sooner if there are any problems between now and then.    Thank you for choosing [facility] and Dr. Te Gonzalez for your orthopedic care. It is our goal to provide you with exceptional care that will help keep you healthy, active, and get you back in the game.    Please do not hesitate to reach out to us via email, phone, or MyChart with any questions, concerns, or feedback.     If you are experiencing pain/discomfort ,or have questions and would like to be connected to the [facility] on-call team, please call this number and specify which Orthopedic / Sports Medicine provider is treating you: [phone]

## 2025-08-04 ENCOUNTER — TELEPHONE (OUTPATIENT)
Dept: PREADMISSION TESTING | Facility: HOSPITAL | Age: 45
End: 2025-08-04
Payer: MEDICARE

## 2025-08-04 NOTE — TELEPHONE ENCOUNTER
Spoke with patient r/t new location for Boot Camp class on 8/05/25 at 9:00am. Informed pt  class has moved to the third floor. Advised pt to please check in at the [facility] [place] [facility] (1st building on the left) and take elevator to the 3rd floor.   [address] is the address. Pt verbalized understanding.

## 2025-08-05 ENCOUNTER — E-CONSULT (OUTPATIENT)
Dept: CARDIOLOGY | Facility: CLINIC | Age: 45
End: 2025-08-05
Payer: MEDICARE

## 2025-08-05 ENCOUNTER — HOSPITAL ENCOUNTER (OUTPATIENT)
Dept: CARDIOLOGY | Facility: HOSPITAL | Age: 45
Discharge: HOME | End: 2025-08-05
Attending: ORTHOPAEDIC SURGERY
Payer: MEDICARE

## 2025-08-05 ENCOUNTER — OFFICE VISIT (OUTPATIENT)
Dept: INTERNAL MEDICINE | Facility: CLINIC | Age: 45
End: 2025-08-05
Payer: MEDICARE

## 2025-08-05 VITALS
TEMPERATURE: 98 F | DIASTOLIC BLOOD PRESSURE: 79 MMHG | OXYGEN SATURATION: 95 % | SYSTOLIC BLOOD PRESSURE: 130 MMHG | HEART RATE: 66 BPM

## 2025-08-05 DIAGNOSIS — Z01.818 PRE-OP TESTING: ICD-10-CM

## 2025-08-05 DIAGNOSIS — Z72.0 TOBACCO USE: Chronic | ICD-10-CM

## 2025-08-05 DIAGNOSIS — M16.12 OSTEOARTHRITIS OF LEFT HIP, UNSPECIFIED OSTEOARTHRITIS TYPE: Primary | ICD-10-CM

## 2025-08-05 DIAGNOSIS — I10 PRIMARY HYPERTENSION: Chronic | ICD-10-CM

## 2025-08-05 DIAGNOSIS — Z01.810 PREOP CARDIOVASCULAR EXAM: Primary | ICD-10-CM

## 2025-08-05 DIAGNOSIS — I25.2 HISTORY OF NON-ST ELEVATION MYOCARDIAL INFARCTION (NSTEMI): ICD-10-CM

## 2025-08-05 LAB
OHS QRS DURATION: 92 MS
OHS QTC CALCULATION: 403 MS

## 2025-08-05 PROCEDURE — 93010 ELECTROCARDIOGRAM REPORT: CPT | Mod: ,,, | Performed by: INTERNAL MEDICINE

## 2025-08-05 PROCEDURE — 99999 PR PBB SHADOW E&M-EST. PATIENT-LVL III: CPT | Mod: PBBFAC,,, | Performed by: NURSE PRACTITIONER

## 2025-08-05 PROCEDURE — 93005 ELECTROCARDIOGRAM TRACING: CPT

## 2025-08-05 NOTE — PROGRESS NOTES
Preoperative History and Physical                                                              Hospital Medicine                                                                  Chief Complaint: Preoperative evaluation     History of Present Illness:      Philipp Gonzales is a 45 y.o. male who presents to the office today for a preoperative consultation at the request of Dr. Gonzalez who plans on performing left Total Hip Arthroplasty on 8/15/25.     Functional Status:      The patient is able to climb a flight of stairs. The patient is able to ambulate short distances without difficulty. The patient's functional status is affected by the surgical problem. The patient's functional status is not affected by shortness of breath, chest pain, dyspnea on exertion and fatigue.    MET score greater than 4    Past Medical History:      Past Medical History:   Diagnosis Date    Anxiety     Essential (primary) hypertension     High cholesterol     Hyperlipidemia     Insomnia     Legally blind     Macular degeneration     NSTEMI (non-ST elevated myocardial infarction) 04/03/2022        Past Surgical History:      Past Surgical History:   Procedure Laterality Date    CORONARY STENT PLACEMENT N/A 4/3/2022    Procedure: INSERTION, STENT, CORONARY ARTERY;  Surgeon: Eliud Cristina MD;  Location: [facility] CATH LAB;  Service: Cardiology;  Laterality: N/A;    HIP SURGERY      as child left    LEFT HEART CATHETERIZATION Left 4/3/2022    Procedure: CATHETERIZATION, HEART, LEFT;  Surgeon: Eliud Cristina MD;  Location: [facility] CATH LAB;  Service: Cardiology;  Laterality: Left;        Social History:      Social History     Socioeconomic History    Marital status: Single   Tobacco Use    Smoking status: Every Day     Current packs/day: 1.00     Types: Cigarettes    Smokeless tobacco: Never   Vaping Use    Vaping status: Never Used   Substance and Sexual Activity    Alcohol use: Yes      Comment: rarely    Drug use: No    Sexual activity: Yes     Partners: Female     Social Drivers of Health     Financial Resource Strain: Low Risk  (6/5/2025)    Overall Financial Resource Strain (CARDIA)     Difficulty of Paying Living Expenses: Not hard at all   Food Insecurity: Food Insecurity Present (6/5/2025)    Hunger Vital Sign     Worried About Running Out of Food in the Last Year: Sometimes true     Ran Out of Food in the Last Year: Sometimes true   Transportation Needs: No Transportation Needs (6/5/2025)    PRAPARE - Transportation     Lack of Transportation (Medical): No     Lack of Transportation (Non-Medical): No   Physical Activity: Insufficiently Active (6/5/2025)    Exercise Vital Sign     Days of Exercise per Week: 2 days     Minutes of Exercise per Session: 10 min   Stress: No Stress Concern Present (6/5/2025)    [employer] - Occupational Stress Questionnaire     Feeling of Stress : Not at all   Housing Stability: High Risk (6/5/2025)    Housing Stability Vital Sign     Unable to Pay for Housing in the Last Year: Yes     Number of Times Moved in the Last Year: 0     Homeless in the Last Year: No        Family History:      Family History   Problem Relation Name Age of Onset    Macular degeneration Mother      Macular degeneration Maternal Grandfather      Hyperlipidemia Father      Diabetes Father      Heart attacks under age 50 Paternal Grandmother         Allergies:      Review of patient's allergies indicates:  No Known Allergies    Medications:      Current Outpatient Medications   Medication Sig    aspirin (ECOTRIN) 81 MG EC tablet Take 1 tablet (81 mg total) by mouth once daily.    atorvastatin (LIPITOR) 80 MG tablet TAKE 1 TABLET BY MOUTH EVERY DAY    clopidogreL (PLAVIX) 75 mg tablet Take 1 tablet (75 mg total) by mouth once daily.    EScitalopram oxalate (LEXAPRO) 20 MG tablet Take 1 tablet (20 mg total) by mouth once daily.    fenofibrate (TRICOR) 54 MG  tablet Take 1 tablet (54 mg total) by mouth once daily.    ferrous sulfate 325 (65 FE) MG EC tablet Take 1 tablet (325 mg total) by mouth 2 (two) times daily.    fluticasone propionate (FLONASE) 50 mcg/actuation nasal spray 2 sprays (100 mcg total) by Each Nostril route once daily.    levocetirizine (XYZAL) 5 MG tablet Take 1 tablet (5 mg total) by mouth nightly as needed.    lisinopriL (PRINIVIL,ZESTRIL) 2.5 MG tablet TAKE ONE TABLET BY MOUTH EVERY DAY    metoprolol succinate (TOPROL-XL) 100 MG 24 hr tablet Take 1 tablet (100 mg total) by mouth once daily.    nabumetone (RELAFEN) 750 MG tablet Take 1 tablet (750 mg total) by mouth 2 (two) times daily as needed for Pain.    traMADoL (ULTRAM) 50 mg tablet Take 1 tablet (50 mg), by mouth, every 8 hours, as needed for pain    traZODone (DESYREL) 100 MG tablet Take 1 tablet (100 mg total) by mouth every evening.     No current facility-administered medications for this visit.       Vitals:      Vitals:    08/05/25 1106   BP: 130/79   Pulse: 66   Temp: 98.2 °F (36.8 °C)       Review of Systems:        Constitutional: Negative for fever, chills, weight loss, malaise/fatigue and diaphoresis.   HENT: Negative for hearing loss, ear pain, nosebleeds, congestion, sore throat, neck pain, tinnitus and ear discharge.    Eyes: Negative for blurred vision, double vision, photophobia, pain, discharge and redness.   Respiratory: Negative for cough, hemoptysis, sputum production, shortness of breath, wheezing and stridor.    Cardiovascular: Negative for chest pain, palpitations, orthopnea, claudication, leg swelling and PND.   Gastrointestinal: Negative for heartburn, nausea, vomiting, abdominal pain, diarrhea, constipation, blood in stool and melena.   Genitourinary: Negative for dysuria, urgency, frequency, hematuria and flank pain.   Musculoskeletal: Negative for myalgias, back pain, joint pain and falls.   Skin: Negative for itching and rash.   Neurological: Negative for  dizziness, tingling, tremors, sensory change, speech change, focal weakness, seizures, loss of consciousness, weakness and headaches.   Endo/Heme/Allergies: Negative for environmental allergies and polydipsia. Does not bruise/bleed easily.   Psychiatric/Behavioral: Negative for depression, suicidal ideas, hallucinations, memory loss and substance abuse. The patient is not nervous/anxious and does not have insomnia.    All 14 systems reviewed and negative except as noted above.    Physical Exam:      Constitutional: Appears well-developed, well-nourished and in no acute distress.  Patient is oriented to person, place, and time.   Head: Normocephalic and atraumatic. Mucous membranes moist.  Neck: Neck supple no mass.   Cardiovascular: Normal rate and regular rhythm.  S1 S2 appreciated by ascultation.  Pulmonary/Chest: Effort normal and clear to auscultation bilaterally. No respiratory distress.   Abdomen: Soft. Non-tender and non-distended. Bowel sounds are normal.   Neurological: Patient is alert and oriented to person, place and time. Moves all extremities.  Skin: Warm and dry. No lesions.  Extremities: No clubbing, cyanosis or edema.    Laboratory data:      Reviewed and noted in plan where applicable. Please see chart for full laboratory data.    Lab Results   Component Value Date     08/05/2025    K 4.7 08/05/2025     08/05/2025    CO2 25 08/05/2025    CALCIUM 9.4 08/05/2025    BUN 14 08/05/2025    CREATININE 1.0 08/05/2025    GLU 88 08/05/2025     Lab Results   Component Value Date    WBC 7.87 08/05/2025    HGB 14.0 08/05/2025    HCT 42.7 08/05/2025    MCV 90 08/05/2025     08/05/2025     Predictors of intubation difficulty:       Morbid obesity? no   Anatomically abnormal facies? no   Prominent incisors? no   Receding mandible? no   Short, thick neck? no   Neck range of motion: normal   Dentition: No chipped, loose, or missing teeth.  Based on the Modified Mallampati, patient is a mallampati  score: III (soft and hard palate and base of uvula visible)    Cardiographics:      ECG:   Results for orders placed or performed during the hospital encounter of 08/05/25   EKG 12-lead    Collection Time: 08/05/25 11:33 AM   Result Value Ref Range    QRS Duration 92 ms    [facility] QTC Calculation 403 ms    Narrative    Test Reason : Z01.818,    Vent. Rate :  59 BPM     Atrial Rate :  59 BPM     P-R Int : 150 ms          QRS Dur :  92 ms      QT Int : 408 ms       P-R-T Axes :  57  60  69 degrees    QTcB Int : 403 ms    Sinus bradycardia  Anterior infarct (cited on or before 21-Nov-2024)  Abnormal ECG  When compared with ECG of 21-Nov-2024 14:52,  No significant change was found  Confirmed by Michael Wiseman ([id number]) on 8/5/2025 5:17:49 PM    Referred By: YUN ZAFAR           Confirmed By: Michael Wiseman        Echocardiogram: 9/21/2022   The left ventricle is normal in size with mildly decreased systolic function.  The estimated ejection fraction is 45%.  Grade I left ventricular diastolic dysfunction.  There is mild left ventricular global hypokinesis.  Normal right ventricular size with normal right ventricular systolic function.  Mild tricuspid regurgitation.  Intermediate central venous pressure (8 mmHg).  The estimated PA systolic pressure is 26 mmHg.    Imaging:      Chest x-ray: not indicated    Assessment and Plan:      Osteoarthritis of left hip  Left Total Hip Arthroplasty on 8/15/25 by Dr. Gonzalez    E-consult from Dr. Turk on 8/5/25  Elevated periop risk of CV events for non-high risk procedure.  Ok to proceed the scheduled procedure without further cardiac study.  OK to hold ASA and Plavix 5 days before the procedure and resume postop once hemodynamically stable    Known risk factors for perioperative complications: Coronary disease    Difficulty with intubation is not anticipated.    Cardiac Risk Estimation: Based on the Revised Cardiac Risk index, patient is a RCRI Score 2 points with a 5% risk of a  major cardiac event in a low risk procedure.    1.) Preoperative workup as follows: ECG, hemoglobin, hematocrit, electrolytes, creatinine, glucose.  2.) Change in medication regimen before surgery: discontinue ASA/Plavix 5 days before surgery,   3.) Prophylaxis for cardiac events with perioperative beta-blockers: Take Metoprolol as prescribed.  4.) Invasive hemodynamic monitoring perioperatively: should be considered.  5.) Deep vein thrombosis prophylaxis postoperatively: intermittent pneumatic compression boots and regimen to be chosen by surgical team.  6.) Surveillance for postoperative MI with ECG immediately postoperatively and on postoperati ve days 1 and 2 AND troponin levels 24 hours postoperatively and on day 4 or hospital discharge (whichever comes first): should be considered.  7.) Current medications which may produce withdrawal symptoms if withheld perioperatively: N/A  8.) Other measures: Postoperative incentive spirometry to prevent pneumonia.     2.) Change in medication regimen before surgery: Stop all blood thinners/aspirin, coumadin, Eliquis, Xarelto..etc. 7 days prior to surgery.     Morning of Procedure medications:  Lexapro, Metoprolol  Hold all other medications morning of surgery: Lipitor, ASA/Plavix (Hold 5 days), Tricor, Iron, Lisinopril, Tramadol  Resume all medications post-operatively  Hold NSAIDs (ASA) and all vitamins/supplements 5 days prior to procedure   Hold oral diabetes medications morning of surgery (denies use)  Hold Ozempic 7 days prior to surgery (denies use)  Hold Adderall 48 hours prior to surgery ( denies use)    History of non-ST elevation myocardial infarction (NSTEMI)  Cardiac Clearance pending    S/p PCI to LAD and RCA  --Hold ASA/Plavix for 5 days.   --Continue Metoprolol and Atorvastatin as scheduled    Tobacco use  Counseled to abstain from Tobacco 24 hours prior to procedure    Primary hypertension  Chronic, Controlled on today's exam  --take Metoprolol AM of  procedure  --hold lisinopril AM of procedure    Electronically signed by JYOTSNA Rangel-BC on 8/5/2025 at 11:23 AM.

## 2025-08-05 NOTE — ASSESSMENT & PLAN NOTE
Chronic, Controlled on today's exam  --take Metoprolol AM of procedure  --hold lisinopril AM of procedure

## 2025-08-05 NOTE — CONSULTS
[facility] [address]  Response for E-Consult     Patient Name: Philipp Gonzales  MRN: [id number]  Primary Care Provider: Luana Hall NP   Requesting Provider: Anna Patterson NP  E-Consult to General Cardiology  Consult performed by: Drew Turk MD  Consult ordered by: Anna Patterson NP          E consult for preop clearance of hip replacement  The chart reviewed.  PMH CAD s/p PCI in 2022 08/25 EKG NSR old ant inafrct  EF 45%    Plan  Elevated periop risk of CV events for non-high risk procedure.  Ok to proceed the scheduled procedure without further cardiac study.  OK to hold ASA and Plavix 5 days before the procedure and resume postop once hemodynamically stable      Total time of Consultation: 10 minute    I did not speak to the requesting provider verbally about this.     *This eConsult is based on the clinical data available to me and is furnished without benefit of a physical examination. The eConsult will need to be interpreted in light of any clinical issues or changes in patient status not available to me at the time of filing this eConsults. Significant changes in patient condition or level of acuity should result in immediate formal consultation and reevaluation. Please alert me if you have further questions.    Thank you for this eConsult referral.     Drew Turk MD  [facility] [address]

## 2025-08-05 NOTE — PRE-PROCEDURE INSTRUCTIONS
Pre op instructions reviewed with patient face to face during Clinic Visit with Provider.    To confirm, Surgery is scheduled on FRIDAY AUGUST 15. We will call you after 2pm the day before Surgery with your arrival time.    *Please report to the [facility] Lobby (1st Floor) located off of [facility] (2nd Entrance/Building on the left, in front of the flag pole).  Address: [address] [place] [address] [place]. [id number]    Your Type & Screen appointment is scheduled on THURSDAY AUGUST 14 @ [facility] ([facility] Location). Please DO NOT remove the red arm band applied.      !!!INSTRUCTIONS IMPORTANT!!!  DO NOT Eat, Drink, or Smoke after 12 midnight unless instructed otherwise by your Surgeon. OK to brush teeth, no gum, candy or mints!    MORNING OF SURGERY, drink small sip of water with the following medications instructed by Pre-Admit Provider:  Lexapro, Metoprolol   Okay to take PM medications night before procedure   Hold AM : Lipitor, ASA/Plavix (Hold 5 days), Tricor, Iron, Lisinopril, Tramadol       *Additional Surgeon Instructions: Take Tylenol 650 mg and drink a bottle of Gatorade the night prior to surgery! BRING WALKER to Hospital if received prior to surgery!   !!!!!! NOTHING RED OR PURPLE !!!!      Stop taking *ADHD Medication:  48 hrs prior to surgery, as this can affect the anesthesia used. Bariatric surgeries must HOLD 7 Days prior to surgery!    Diabetic/Prediabetic Patients: If you take diabetic or weight loss medication, Do NOT take morning of surgery unless instructed by Doctor. Metformin to be stopped 24 hrs prior to surgery.  DO NOT take long-acting insulin the evening before surgery. Blood sugars will be checked in pre-op by Nurse.    If your are taking Ozempic/ Mounjaro/ Wegovy/ Trulicity/ Semaglutide, Tirzepatide injections or weight loss medication, such of Phentermine, please notify us immediately as they must be stopped 7 days prior to surgery.    !!!Patients should HOLD all vitamins,  herbal supplements,aspirins, NSAIDS & weight loss medications 7 days prior to surgery!!! Ok to take Tylenol:)    ____  Avoid Alcoholic beverages 3 days prior to surgery, as it can thin the blood.  ____  NO Acrylic/fake nails or nail polish worn day of surgery (specifically hand/arm & foot surgeries).  ____  NO powder, lotions, deodorants, oils or cream on body.  ____  Remove all jewelry, piercings, & foreign objects prior to arrival and leave at home.  ____  Remove Dentures, Hearing Aids & Contact Lens prior to surgery.  ____  Bring photo ID and insurance information to hospital (Leave Valuables at Home).  ____  If going home the same day, arrange for a ride home. You will not be able to drive for 24 hrs if Anesthesia was used.   ____  Females (ages 11-60): may need to give a urine sample the morning of surgery; please see Pre op Nurse prior to using the restroom.  ____  Males: Stop ED medications (Viagra, Cialis) 24 hrs prior to surgery.  ____  Wear clean, loose fitting clothing to allow for dressings/ bandages.      Bathing Instructions:       -Do not shave your body 3 days before the day of surgery.              -Shower & Rinse your body as usual with anti-bacterial Soap, (Dial), for three days before surgery                on the evening prior to surgery and the morning of surgery, after you shower with the anti-bacterial soap,                 rinse well and then apply one packet of Hibiclens soap to the surgical site.              -Wash the site gently for 5 full minutes. Do Not scrub your skin too hard.              -Rinse your body thoroughly.               -Do not use oil, lotion, cream, powder or deodorant              -Pat yourself day with a clean, soft towel.              -Do not use oil, lotion, cream, powder or deodorant              -Dress in clean clothes      [facility] Visitor/Ride Policy:  Only 2 adults allowed in pre op/recovery area during your procedure. You MUST HAVE A RIDE HOME from a  responsible adult that you know and trust. Medical Transport, Uber or Lyft can ONLY be used if patient has a responsible adult to accompany them during ride home.        *Signs and symptoms of Infection Before or After Surgery:               !!!If you experience any fever, chills, nausea/ vomiting, foul odor/ excessive drainage from surgical site, flu-like symptoms, new wounds or cuts, PLEASE CALL THE SURGEON OFFICE at [phone] or SEND MESSAGE THROUGH [employer]!!!       *If you are running late day of surgery, please call the Surgery Dept @ [phone].    *Billing question, please call:  [phone] or [phone]       Thank you,  -[facility] Pre Admit Dept.  [phone] or [phone]  M-F 7:30 am-4:00 pm (Closed Major Holidays)    Additional Tests Scheduled Today:  EKG 4TH,LABS (1ST Floor) Check in at the !

## 2025-08-05 NOTE — ASSESSMENT & PLAN NOTE
Cardiac Clearance pending    S/p PCI to LAD and RCA  --Hold ASA/Plavix for 5 days.   --Continue Metoprolol and Atorvastatin as scheduled

## 2025-08-05 NOTE — ASSESSMENT & PLAN NOTE
Left Total Hip Arthroplasty on 8/15/25 by Dr. Gonzalez    E-consult from Dr. Turk on 5/14/25 for Colonoscopy  Elevated periop risk of CV events for non-high risk procedure.  Ok to proceed the scheduled procedure without further cardiac study.  OK to hold ASA and Plavix 5 days before the procedure and resume postop once hemodynamically stable    Known risk factors for perioperative complications: Coronary disease    Difficulty with intubation is not anticipated.    Cardiac Risk Estimation: Based on the Revised Cardiac Risk index, patient is a RCRI Score 2 points with a 5% risk of a major cardiac event in a low risk procedure.    1.) Preoperative workup as follows: ECG, hemoglobin, hematocrit, electrolytes, creatinine, glucose.  2.) Change in medication regimen before surgery: discontinue ASA/Plavix 5 days before surgery,   3.) Prophylaxis for cardiac events with perioperative beta-blockers: Take Metoprolol as prescribed.  4.) Invasive hemodynamic monitoring perioperatively: should be considered.  5.) Deep vein thrombosis prophylaxis postoperatively: intermittent pneumatic compression boots and regimen to be chosen by surgical team.  6.) Surveillance for postoperative MI with ECG immediately postoperatively and on postoperati ve days 1 and 2 AND troponin levels 24 hours postoperatively and on day 4 or hospital discharge (whichever comes first): should be considered.  7.) Current medications which may produce withdrawal symptoms if withheld perioperatively: N/A  8.) Other measures: Postoperative incentive spirometry to prevent pneumonia.     2.) Change in medication regimen before surgery: Stop all blood thinners/aspirin, coumadin, Eliquis, Xarelto..etc. 7 days prior to surgery.     Morning of Procedure medications:  Lexapro, Metoprolol  Hold all other medications morning of surgery: Lipitor, ASA/Plavix (Hold 5 days), Tricor, Iron, Lisinopril, Tramadol  Resume all medications post-operatively  Hold NSAIDs (ASA) and  all vitamins/supplements 5 days prior to procedure   Hold oral diabetes medications morning of surgery (denies use)  Hold Ozempic 7 days prior to surgery (denies use)  Hold Adderall 48 hours prior to surgery ( denies use)

## 2025-08-11 PROBLEM — Z01.810 PREOP CARDIOVASCULAR EXAM: Status: RESOLVED | Noted: 2025-08-05 | Resolved: 2025-08-11

## 2025-08-14 ENCOUNTER — ANESTHESIA EVENT (OUTPATIENT)
Dept: SURGERY | Facility: HOSPITAL | Age: 45
End: 2025-08-14
Payer: MEDICARE

## 2025-08-14 ENCOUNTER — LAB VISIT (OUTPATIENT)
Dept: LAB | Facility: HOSPITAL | Age: 45
End: 2025-08-14
Attending: ORTHOPAEDIC SURGERY
Payer: MEDICARE

## 2025-08-14 DIAGNOSIS — Z01.818 PRE-OP TESTING: ICD-10-CM

## 2025-08-14 LAB
ABORH RETYPE: NORMAL
INDIRECT COOMBS: NORMAL
RH BLD: NORMAL
SPECIMEN OUTDATE: NORMAL

## 2025-08-14 PROCEDURE — 36415 COLL VENOUS BLD VENIPUNCTURE: CPT

## 2025-08-14 PROCEDURE — 86901 BLOOD TYPING SEROLOGIC RH(D): CPT | Performed by: ORTHOPAEDIC SURGERY

## 2025-08-15 ENCOUNTER — ANESTHESIA (OUTPATIENT)
Dept: SURGERY | Facility: HOSPITAL | Age: 45
End: 2025-08-15
Payer: MEDICARE

## 2025-08-15 ENCOUNTER — HOSPITAL ENCOUNTER (OUTPATIENT)
Facility: HOSPITAL | Age: 45
Discharge: HOME HEALTH | End: 2025-08-15
Attending: ORTHOPAEDIC SURGERY | Admitting: ORTHOPAEDIC SURGERY
Payer: MEDICARE

## 2025-08-15 VITALS
WEIGHT: 199.94 LBS | HEART RATE: 73 BPM | OXYGEN SATURATION: 95 % | BODY MASS INDEX: 32.13 KG/M2 | SYSTOLIC BLOOD PRESSURE: 136 MMHG | DIASTOLIC BLOOD PRESSURE: 88 MMHG | HEIGHT: 66 IN | RESPIRATION RATE: 16 BRPM | TEMPERATURE: 98 F

## 2025-08-15 DIAGNOSIS — M16.12 PRIMARY OSTEOARTHRITIS OF LEFT HIP: Primary | ICD-10-CM

## 2025-08-15 PROCEDURE — C1776 JOINT DEVICE (IMPLANTABLE): HCPCS | Performed by: ORTHOPAEDIC SURGERY

## 2025-08-15 PROCEDURE — 25000003 PHARM REV CODE 250: Performed by: FAMILY MEDICINE

## 2025-08-15 PROCEDURE — 63600175 PHARM REV CODE 636 W HCPCS: Performed by: ORTHOPAEDIC SURGERY

## 2025-08-15 PROCEDURE — 37000009 HC ANESTHESIA EA ADD 15 MINS: Performed by: ORTHOPAEDIC SURGERY

## 2025-08-15 PROCEDURE — 36000711: Performed by: ORTHOPAEDIC SURGERY

## 2025-08-15 PROCEDURE — 97162 PT EVAL MOD COMPLEX 30 MIN: CPT

## 2025-08-15 PROCEDURE — 63600175 PHARM REV CODE 636 W HCPCS: Performed by: FAMILY MEDICINE

## 2025-08-15 PROCEDURE — 36000710: Performed by: ORTHOPAEDIC SURGERY

## 2025-08-15 PROCEDURE — C1729 CATH, DRAINAGE: HCPCS | Performed by: ORTHOPAEDIC SURGERY

## 2025-08-15 PROCEDURE — 71000016 HC POSTOP RECOV ADDL HR: Performed by: ORTHOPAEDIC SURGERY

## 2025-08-15 PROCEDURE — 71000033 HC RECOVERY, INTIAL HOUR: Performed by: ORTHOPAEDIC SURGERY

## 2025-08-15 PROCEDURE — 97530 THERAPEUTIC ACTIVITIES: CPT

## 2025-08-15 PROCEDURE — 25000003 PHARM REV CODE 250: Performed by: ORTHOPAEDIC SURGERY

## 2025-08-15 PROCEDURE — 71000015 HC POSTOP RECOV 1ST HR: Performed by: ORTHOPAEDIC SURGERY

## 2025-08-15 PROCEDURE — 27201423 OPTIME MED/SURG SUP & DEVICES STERILE SUPPLY: Performed by: ORTHOPAEDIC SURGERY

## 2025-08-15 PROCEDURE — 27130 TOTAL HIP ARTHROPLASTY: CPT | Mod: AS,LT,, | Performed by: PHYSICIAN ASSISTANT

## 2025-08-15 PROCEDURE — 37000008 HC ANESTHESIA 1ST 15 MINUTES: Performed by: ORTHOPAEDIC SURGERY

## 2025-08-15 PROCEDURE — C1713 ANCHOR/SCREW BN/BN,TIS/BN: HCPCS | Performed by: ORTHOPAEDIC SURGERY

## 2025-08-15 PROCEDURE — 63600175 PHARM REV CODE 636 W HCPCS: Performed by: ANESTHESIOLOGY

## 2025-08-15 PROCEDURE — 27130 TOTAL HIP ARTHROPLASTY: CPT | Mod: LT,,, | Performed by: ORTHOPAEDIC SURGERY

## 2025-08-15 DEVICE — IMPLANTABLE DEVICE: Type: IMPLANTABLE DEVICE | Site: HIP | Status: FUNCTIONAL

## 2025-08-15 DEVICE — SCREW PINNACLE 6.5 X 25: Type: IMPLANTABLE DEVICE | Site: HIP | Status: FUNCTIONAL

## 2025-08-15 DEVICE — LINE ACET PINN 36X52 ALTRX: Type: IMPLANTABLE DEVICE | Site: HIP | Status: FUNCTIONAL

## 2025-08-15 DEVICE — CUP ACET PINN 10D 32MM 52MM: Type: IMPLANTABLE DEVICE | Site: HIP | Status: FUNCTIONAL

## 2025-08-15 RX ORDER — ONDANSETRON HYDROCHLORIDE 2 MG/ML
4 INJECTION, SOLUTION INTRAVENOUS DAILY PRN
Status: DISCONTINUED | OUTPATIENT
Start: 2025-08-15 | End: 2025-08-15 | Stop reason: HOSPADM

## 2025-08-15 RX ORDER — ONDANSETRON HYDROCHLORIDE 2 MG/ML
INJECTION, SOLUTION INTRAVENOUS
Status: DISCONTINUED | OUTPATIENT
Start: 2025-08-15 | End: 2025-08-15

## 2025-08-15 RX ORDER — PROMETHAZINE HYDROCHLORIDE 25 MG/1
25 SUPPOSITORY RECTAL EVERY 6 HOURS PRN
Status: DISCONTINUED | OUTPATIENT
Start: 2025-08-15 | End: 2025-08-15 | Stop reason: HOSPADM

## 2025-08-15 RX ORDER — CEPHALEXIN 500 MG/1
500 CAPSULE ORAL EVERY 6 HOURS
Qty: 20 CAPSULE | Refills: 0 | Status: SHIPPED | OUTPATIENT
Start: 2025-08-15

## 2025-08-15 RX ORDER — FENTANYL CITRATE 50 UG/ML
INJECTION, SOLUTION INTRAMUSCULAR; INTRAVENOUS
Status: DISCONTINUED | OUTPATIENT
Start: 2025-08-15 | End: 2025-08-15

## 2025-08-15 RX ORDER — ONDANSETRON HYDROCHLORIDE 2 MG/ML
4 INJECTION, SOLUTION INTRAVENOUS EVERY 12 HOURS PRN
Status: DISCONTINUED | OUTPATIENT
Start: 2025-08-15 | End: 2025-08-15 | Stop reason: HOSPADM

## 2025-08-15 RX ORDER — OXYCODONE AND ACETAMINOPHEN 10; 325 MG/1; MG/1
1 TABLET ORAL EVERY 4 HOURS PRN
Qty: 42 TABLET | Refills: 0 | Status: SHIPPED | OUTPATIENT
Start: 2025-08-15 | End: 2025-08-21 | Stop reason: SDUPTHER

## 2025-08-15 RX ORDER — DIPHENHYDRAMINE HYDROCHLORIDE 50 MG/ML
25 INJECTION, SOLUTION INTRAMUSCULAR; INTRAVENOUS EVERY 6 HOURS PRN
Status: DISCONTINUED | OUTPATIENT
Start: 2025-08-15 | End: 2025-08-15 | Stop reason: HOSPADM

## 2025-08-15 RX ORDER — PROPOFOL 10 MG/ML
VIAL (ML) INTRAVENOUS
Status: DISCONTINUED | OUTPATIENT
Start: 2025-08-15 | End: 2025-08-15

## 2025-08-15 RX ORDER — CEFAZOLIN SODIUM 1 G/3ML
2 INJECTION, POWDER, FOR SOLUTION INTRAMUSCULAR; INTRAVENOUS ONCE
Status: COMPLETED | OUTPATIENT
Start: 2025-08-15 | End: 2025-08-15

## 2025-08-15 RX ORDER — HYDROCODONE BITARTRATE AND ACETAMINOPHEN 5; 325 MG/1; MG/1
1 TABLET ORAL EVERY 4 HOURS PRN
Status: DISCONTINUED | OUTPATIENT
Start: 2025-08-15 | End: 2025-08-15 | Stop reason: HOSPADM

## 2025-08-15 RX ORDER — CHLORHEXIDINE GLUCONATE ORAL RINSE 1.2 MG/ML
10 SOLUTION DENTAL 2 TIMES DAILY
Status: DISCONTINUED | OUTPATIENT
Start: 2025-08-15 | End: 2025-08-15 | Stop reason: HOSPADM

## 2025-08-15 RX ORDER — FENTANYL CITRATE 50 UG/ML
25 INJECTION, SOLUTION INTRAMUSCULAR; INTRAVENOUS EVERY 5 MIN PRN
Status: COMPLETED | OUTPATIENT
Start: 2025-08-15 | End: 2025-08-15

## 2025-08-15 RX ORDER — LIDOCAINE HYDROCHLORIDE 20 MG/ML
INJECTION, SOLUTION EPIDURAL; INFILTRATION; INTRACAUDAL; PERINEURAL
Status: DISCONTINUED | OUTPATIENT
Start: 2025-08-15 | End: 2025-08-15

## 2025-08-15 RX ORDER — ASPIRIN 325 MG
325 TABLET ORAL DAILY
Qty: 30 TABLET | Refills: 0 | Status: SHIPPED | OUTPATIENT
Start: 2025-08-15 | End: 2026-08-15

## 2025-08-15 RX ORDER — ROPIVACAINE/EPI/CLONIDINE/KET 2.46-0.005
SYRINGE (ML) INJECTION
Status: DISCONTINUED | OUTPATIENT
Start: 2025-08-15 | End: 2025-08-15 | Stop reason: HOSPADM

## 2025-08-15 RX ORDER — TRANEXAMIC ACID 10 MG/ML
1000 INJECTION, SOLUTION INTRAVENOUS ONCE
Status: COMPLETED | OUTPATIENT
Start: 2025-08-15 | End: 2025-08-15

## 2025-08-15 RX ORDER — HYDROMORPHONE HYDROCHLORIDE 2 MG/ML
0.2 INJECTION, SOLUTION INTRAMUSCULAR; INTRAVENOUS; SUBCUTANEOUS EVERY 5 MIN PRN
Status: DISCONTINUED | OUTPATIENT
Start: 2025-08-15 | End: 2025-08-15 | Stop reason: HOSPADM

## 2025-08-15 RX ORDER — PHENYLEPHRINE HYDROCHLORIDE 10 MG/ML
INJECTION INTRAVENOUS
Status: DISCONTINUED | OUTPATIENT
Start: 2025-08-15 | End: 2025-08-15

## 2025-08-15 RX ORDER — DEXAMETHASONE SODIUM PHOSPHATE 4 MG/ML
INJECTION, SOLUTION INTRA-ARTICULAR; INTRALESIONAL; INTRAMUSCULAR; INTRAVENOUS; SOFT TISSUE
Status: DISCONTINUED | OUTPATIENT
Start: 2025-08-15 | End: 2025-08-15

## 2025-08-15 RX ORDER — ROCURONIUM BROMIDE 10 MG/ML
INJECTION, SOLUTION INTRAVENOUS
Status: DISCONTINUED | OUTPATIENT
Start: 2025-08-15 | End: 2025-08-15

## 2025-08-15 RX ADMIN — FENTANYL CITRATE 25 MCG: 50 INJECTION INTRAMUSCULAR; INTRAVENOUS at 10:08

## 2025-08-15 RX ADMIN — SUGAMMADEX 200 MG: 100 INJECTION, SOLUTION INTRAVENOUS at 09:08

## 2025-08-15 RX ADMIN — FENTANYL CITRATE 100 MCG: 50 INJECTION, SOLUTION INTRAMUSCULAR; INTRAVENOUS at 07:08

## 2025-08-15 RX ADMIN — DEXAMETHASONE SODIUM PHOSPHATE 8 MG: 4 INJECTION, SOLUTION INTRA-ARTICULAR; INTRALESIONAL; INTRAMUSCULAR; INTRAVENOUS; SOFT TISSUE at 09:08

## 2025-08-15 RX ADMIN — PHENYLEPHRINE HYDROCHLORIDE 100 MCG: 10 INJECTION INTRAVENOUS at 07:08

## 2025-08-15 RX ADMIN — CEFAZOLIN 2 G: 330 INJECTION, POWDER, FOR SOLUTION INTRAMUSCULAR; INTRAVENOUS at 07:08

## 2025-08-15 RX ADMIN — FENTANYL CITRATE 50 MCG: 50 INJECTION, SOLUTION INTRAMUSCULAR; INTRAVENOUS at 08:08

## 2025-08-15 RX ADMIN — SODIUM CHLORIDE, SODIUM LACTATE, POTASSIUM CHLORIDE, AND CALCIUM CHLORIDE: .6; .31; .03; .02 INJECTION, SOLUTION INTRAVENOUS at 07:08

## 2025-08-15 RX ADMIN — FENTANYL CITRATE 50 MCG: 50 INJECTION, SOLUTION INTRAMUSCULAR; INTRAVENOUS at 09:08

## 2025-08-15 RX ADMIN — SODIUM CHLORIDE, SODIUM LACTATE, POTASSIUM CHLORIDE, AND CALCIUM CHLORIDE: .6; .31; .03; .02 INJECTION, SOLUTION INTRAVENOUS at 06:08

## 2025-08-15 RX ADMIN — ROCURONIUM BROMIDE 10 MG: 10 INJECTION, SOLUTION INTRAVENOUS at 07:08

## 2025-08-15 RX ADMIN — PHENYLEPHRINE HYDROCHLORIDE 200 MCG: 10 INJECTION INTRAVENOUS at 07:08

## 2025-08-15 RX ADMIN — ROCURONIUM BROMIDE 50 MG: 10 INJECTION, SOLUTION INTRAVENOUS at 07:08

## 2025-08-15 RX ADMIN — ONDANSETRON 4 MG: 2 INJECTION INTRAMUSCULAR; INTRAVENOUS at 09:08

## 2025-08-15 RX ADMIN — LIDOCAINE HYDROCHLORIDE 50 MG: 20 INJECTION, SOLUTION EPIDURAL; INFILTRATION; INTRACAUDAL; PERINEURAL at 07:08

## 2025-08-15 RX ADMIN — ONDANSETRON 4 MG: 2 INJECTION INTRAMUSCULAR; INTRAVENOUS at 10:08

## 2025-08-15 RX ADMIN — TRANEXAMIC ACID 1000 MG: 10 INJECTION, SOLUTION INTRAVENOUS at 09:08

## 2025-08-15 RX ADMIN — TRANEXAMIC ACID 1000 MG: 10 INJECTION, SOLUTION INTRAVENOUS at 07:08

## 2025-08-15 RX ADMIN — HYDROCODONE BITARTRATE AND ACETAMINOPHEN 1 TABLET: 5; 325 TABLET ORAL at 10:08

## 2025-08-15 RX ADMIN — PROPOFOL 200 MG: 10 INJECTION, EMULSION INTRAVENOUS at 07:08

## 2025-08-18 ENCOUNTER — TELEPHONE (OUTPATIENT)
Dept: ORTHOPEDICS | Facility: CLINIC | Age: 45
End: 2025-08-18
Payer: MEDICARE

## 2025-08-21 DIAGNOSIS — M16.12 PRIMARY OSTEOARTHRITIS OF LEFT HIP: ICD-10-CM

## 2025-08-21 RX ORDER — OXYCODONE AND ACETAMINOPHEN 10; 325 MG/1; MG/1
1 TABLET ORAL EVERY 6 HOURS PRN
Qty: 28 TABLET | Refills: 0 | Status: SHIPPED | OUTPATIENT
Start: 2025-08-22 | End: 2025-08-29

## 2025-08-28 ENCOUNTER — HOSPITAL ENCOUNTER (OUTPATIENT)
Dept: RADIOLOGY | Facility: HOSPITAL | Age: 45
Discharge: HOME | End: 2025-08-28
Attending: ORTHOPAEDIC SURGERY
Payer: MEDICARE

## 2025-08-28 DIAGNOSIS — M25.552 PAIN OF LEFT HIP: ICD-10-CM

## 2025-08-28 PROCEDURE — 73502 X-RAY EXAM HIP UNI 2-3 VIEWS: CPT | Mod: TC,LT

## 2025-08-28 PROCEDURE — 73502 X-RAY EXAM HIP UNI 2-3 VIEWS: CPT | Mod: 26,LT,, | Performed by: RADIOLOGY

## (undated) DEVICE — GUIDE LAUNCHER 6FR JR 4.0 SH

## (undated) DEVICE — CATH IMPULSE 6FR MULTI-PAK

## (undated) DEVICE — COVER TABLE HVY DTY 60X90IN

## (undated) DEVICE — NDL SPINAL 18GX3.5 SPINOCAN

## (undated) DEVICE — MANIFOLD 4 PORT

## (undated) DEVICE — SUT ETHIBOND XTRA 1 CTX

## (undated) DEVICE — KIT IRR SUCTION HND PIECE

## (undated) DEVICE — WIRE X-SUP CHOICE PT .014X182

## (undated) DEVICE — SOL NACL IRR 1000ML BTL

## (undated) DEVICE — POUCH INSTRUMENT 2 POCKET

## (undated) DEVICE — SUT VICRYL 1 OB 36 CTX

## (undated) DEVICE — SEALER AQUAMANTYS 2.3 BIPOLAR

## (undated) DEVICE — DRAPE FULL SHEET 70X100IN

## (undated) DEVICE — SYR 30CC LUER LOCK

## (undated) DEVICE — EVACUATOR WOUND BULB 100CC

## (undated) DEVICE — TOWEL OR DISP STRL BLUE 4/PK

## (undated) DEVICE — GLOVE SURG PLYSPHRN ORTH SZ7.5

## (undated) DEVICE — PAD ABDOMINAL STERILE 8X10IN

## (undated) DEVICE — SUT VICRYL 3-0 CT UNDYED

## (undated) DEVICE — PACK CATH LAB CUSTOM BR

## (undated) DEVICE — DRAPE U SPLIT SHEET 54X76IN

## (undated) DEVICE — DRAIN SIL FLT 3/4 FLTD 7F TRCR

## (undated) DEVICE — ALCOHOL ISOPROPYL 4OZ

## (undated) DEVICE — WIRE BMW 014X190

## (undated) DEVICE — GUIDEWIRE WHOLEY HI TORQ 175CM

## (undated) DEVICE — PAD CONVOLUTED 34X72

## (undated) DEVICE — COVER LIGHT HANDLE 80/CA

## (undated) DEVICE — SHEATH INTRODUCER 6FR 11CM

## (undated) DEVICE — OMNIPAQUE 300MG 150ML VIAL

## (undated) DEVICE — GUIDE LAUNCHER 6FR EBU 3.5

## (undated) DEVICE — CATH BLLN FG APEX MR 2.00X8MM

## (undated) DEVICE — HOOD FLYTE PEELWY STERISHIELD

## (undated) DEVICE — CATH DIAG IMPULSE 6FR FL4

## (undated) DEVICE — UNDERGLOVES BIOGEL PI SIZE 7.5

## (undated) DEVICE — DEVICE PERCLOSE SUT CLSR 6FR

## (undated) DEVICE — POSITIONER PINKPROTECT ARC MED

## (undated) DEVICE — CATH BLLN FG APEX MR 2.50X20MM

## (undated) DEVICE — PACK BASIC SETUP SC BR

## (undated) DEVICE — DRAPE MOBILE C-ARM

## (undated) DEVICE — DRAPE THREE-QTR REINF 53X77IN

## (undated) DEVICE — ELECTRODE REM PLYHSV RETURN 9

## (undated) DEVICE — DRAPE HIP PCH 112X137X89IN

## (undated) DEVICE — DRAPE INCISE IOBAN 2 23X33IN

## (undated) DEVICE — TAPE SILK 3IN

## (undated) DEVICE — APPLICATOR CHLORAPREP ORN 26ML

## (undated) DEVICE — SYS CLSR DERMABOND PRINEO 42CM

## (undated) DEVICE — KIT WATCHDOG HEMSTAS VALVE 8FR

## (undated) DEVICE — SOCKINETTE IMPERVIOUS 12X48IN

## (undated) DEVICE — BNDG COFLEX FOAM LF2 ST 4X5YD

## (undated) DEVICE — SYS SURGIPHOR STRL IRRG

## (undated) DEVICE — CATH IMPULSE PIGTAIL 6F 110CM

## (undated) DEVICE — GUIDEWIRE EMERALD .035IN 260CM

## (undated) DEVICE — BLADE SAW SAGITTAL18X1.19X90MM

## (undated) DEVICE — BLADE EZ CLEAN 2 1/2

## (undated) DEVICE — CATH DIAG IMPULSE 6FR FR4

## (undated) DEVICE — GOWN NONREINF SET-IN SLV 2XL